# Patient Record
Sex: FEMALE | Race: WHITE | Employment: OTHER | URBAN - NONMETROPOLITAN AREA
[De-identification: names, ages, dates, MRNs, and addresses within clinical notes are randomized per-mention and may not be internally consistent; named-entity substitution may affect disease eponyms.]

---

## 2017-01-25 DIAGNOSIS — F41.9 ANXIETY: ICD-10-CM

## 2017-01-25 RX ORDER — LORAZEPAM 1 MG/1
TABLET ORAL
Qty: 30 TABLET | Refills: 0 | Status: SHIPPED | OUTPATIENT
Start: 2017-01-25 | End: 2017-02-16 | Stop reason: SDUPTHER

## 2017-02-16 ENCOUNTER — OFFICE VISIT (OUTPATIENT)
Dept: FAMILY MEDICINE CLINIC | Age: 67
End: 2017-02-16

## 2017-02-16 VITALS
WEIGHT: 143 LBS | SYSTOLIC BLOOD PRESSURE: 116 MMHG | HEIGHT: 62 IN | TEMPERATURE: 98 F | DIASTOLIC BLOOD PRESSURE: 70 MMHG | OXYGEN SATURATION: 99 % | HEART RATE: 63 BPM | BODY MASS INDEX: 26.31 KG/M2

## 2017-02-16 DIAGNOSIS — F41.9 ANXIETY: Primary | ICD-10-CM

## 2017-02-16 DIAGNOSIS — R25.1 OCCASIONAL TREMORS: ICD-10-CM

## 2017-02-16 DIAGNOSIS — F41.9 ANXIETY: ICD-10-CM

## 2017-02-16 LAB
T4 TOTAL: 7.6 UG/DL (ref 4.5–11.7)
TSH SERPL DL<=0.05 MIU/L-ACNC: 1.03 UIU/ML (ref 0.27–4.2)

## 2017-02-16 PROCEDURE — G8420 CALC BMI NORM PARAMETERS: HCPCS | Performed by: NURSE PRACTITIONER

## 2017-02-16 PROCEDURE — 1090F PRES/ABSN URINE INCON ASSESS: CPT | Performed by: NURSE PRACTITIONER

## 2017-02-16 PROCEDURE — G8400 PT W/DXA NO RESULTS DOC: HCPCS | Performed by: NURSE PRACTITIONER

## 2017-02-16 PROCEDURE — 99213 OFFICE O/P EST LOW 20 MIN: CPT | Performed by: NURSE PRACTITIONER

## 2017-02-16 PROCEDURE — 3014F SCREEN MAMMO DOC REV: CPT | Performed by: NURSE PRACTITIONER

## 2017-02-16 PROCEDURE — 4040F PNEUMOC VAC/ADMIN/RCVD: CPT | Performed by: NURSE PRACTITIONER

## 2017-02-16 PROCEDURE — G8427 DOCREV CUR MEDS BY ELIG CLIN: HCPCS | Performed by: NURSE PRACTITIONER

## 2017-02-16 PROCEDURE — 1036F TOBACCO NON-USER: CPT | Performed by: NURSE PRACTITIONER

## 2017-02-16 PROCEDURE — G8484 FLU IMMUNIZE NO ADMIN: HCPCS | Performed by: NURSE PRACTITIONER

## 2017-02-16 PROCEDURE — 3017F COLORECTAL CA SCREEN DOC REV: CPT | Performed by: NURSE PRACTITIONER

## 2017-02-16 PROCEDURE — 1123F ACP DISCUSS/DSCN MKR DOCD: CPT | Performed by: NURSE PRACTITIONER

## 2017-02-16 RX ORDER — LORAZEPAM 1 MG/1
TABLET ORAL
Qty: 30 TABLET | Refills: 0 | Status: SHIPPED | OUTPATIENT
Start: 2017-02-16 | End: 2017-03-30 | Stop reason: SDUPTHER

## 2017-02-16 ASSESSMENT — PATIENT HEALTH QUESTIONNAIRE - PHQ9
2. FEELING DOWN, DEPRESSED OR HOPELESS: 0
1. LITTLE INTEREST OR PLEASURE IN DOING THINGS: 0
SUM OF ALL RESPONSES TO PHQ QUESTIONS 1-9: 0
SUM OF ALL RESPONSES TO PHQ9 QUESTIONS 1 & 2: 0

## 2017-02-16 ASSESSMENT — ENCOUNTER SYMPTOMS
SHORTNESS OF BREATH: 0
COUGH: 0

## 2017-03-30 DIAGNOSIS — F41.9 ANXIETY: ICD-10-CM

## 2017-03-30 RX ORDER — LORAZEPAM 1 MG/1
TABLET ORAL
Qty: 30 TABLET | Refills: 0 | Status: SHIPPED | OUTPATIENT
Start: 2017-03-30 | End: 2017-05-02 | Stop reason: SDUPTHER

## 2017-05-02 DIAGNOSIS — F41.9 ANXIETY: ICD-10-CM

## 2017-05-02 RX ORDER — LORAZEPAM 1 MG/1
TABLET ORAL
Qty: 30 TABLET | Refills: 0 | Status: SHIPPED | OUTPATIENT
Start: 2017-05-02 | End: 2017-05-16 | Stop reason: SDUPTHER

## 2017-05-16 ENCOUNTER — OFFICE VISIT (OUTPATIENT)
Dept: FAMILY MEDICINE CLINIC | Age: 67
End: 2017-05-16

## 2017-05-16 VITALS
OXYGEN SATURATION: 98 % | SYSTOLIC BLOOD PRESSURE: 110 MMHG | DIASTOLIC BLOOD PRESSURE: 64 MMHG | BODY MASS INDEX: 25.58 KG/M2 | WEIGHT: 139 LBS | HEIGHT: 62 IN | TEMPERATURE: 97.9 F | HEART RATE: 61 BPM

## 2017-05-16 DIAGNOSIS — Z12.31 SCREENING MAMMOGRAM, ENCOUNTER FOR: ICD-10-CM

## 2017-05-16 DIAGNOSIS — I49.8 SINUS ARRHYTHMIA: ICD-10-CM

## 2017-05-16 DIAGNOSIS — I10 ESSENTIAL HYPERTENSION: Primary | ICD-10-CM

## 2017-05-16 DIAGNOSIS — Z78.0 POST-MENOPAUSAL: ICD-10-CM

## 2017-05-16 DIAGNOSIS — F41.9 ANXIETY: ICD-10-CM

## 2017-05-16 PROCEDURE — 1090F PRES/ABSN URINE INCON ASSESS: CPT | Performed by: NURSE PRACTITIONER

## 2017-05-16 PROCEDURE — 1036F TOBACCO NON-USER: CPT | Performed by: NURSE PRACTITIONER

## 2017-05-16 PROCEDURE — 3017F COLORECTAL CA SCREEN DOC REV: CPT | Performed by: NURSE PRACTITIONER

## 2017-05-16 PROCEDURE — 1123F ACP DISCUSS/DSCN MKR DOCD: CPT | Performed by: NURSE PRACTITIONER

## 2017-05-16 PROCEDURE — 3014F SCREEN MAMMO DOC REV: CPT | Performed by: NURSE PRACTITIONER

## 2017-05-16 PROCEDURE — 4040F PNEUMOC VAC/ADMIN/RCVD: CPT | Performed by: NURSE PRACTITIONER

## 2017-05-16 PROCEDURE — 99213 OFFICE O/P EST LOW 20 MIN: CPT | Performed by: NURSE PRACTITIONER

## 2017-05-16 PROCEDURE — G8420 CALC BMI NORM PARAMETERS: HCPCS | Performed by: NURSE PRACTITIONER

## 2017-05-16 PROCEDURE — G8400 PT W/DXA NO RESULTS DOC: HCPCS | Performed by: NURSE PRACTITIONER

## 2017-05-16 PROCEDURE — G8427 DOCREV CUR MEDS BY ELIG CLIN: HCPCS | Performed by: NURSE PRACTITIONER

## 2017-05-16 RX ORDER — ATENOLOL 50 MG/1
50 TABLET ORAL DAILY
Qty: 30 TABLET | Refills: 5 | Status: SHIPPED | OUTPATIENT
Start: 2017-05-16 | End: 2017-11-16 | Stop reason: SDUPTHER

## 2017-05-16 RX ORDER — LORAZEPAM 1 MG/1
TABLET ORAL
Qty: 30 TABLET | Refills: 0 | Status: SHIPPED | OUTPATIENT
Start: 2017-05-16 | End: 2017-06-12 | Stop reason: SDUPTHER

## 2017-05-16 ASSESSMENT — ENCOUNTER SYMPTOMS
SHORTNESS OF BREATH: 0
ORTHOPNEA: 0
COUGH: 0

## 2017-05-30 ENCOUNTER — HOSPITAL ENCOUNTER (OUTPATIENT)
Dept: WOMENS IMAGING | Age: 67
Discharge: HOME | End: 2017-05-30
Payer: MEDICARE

## 2017-05-30 DIAGNOSIS — Z12.31 SCREENING MAMMOGRAM, ENCOUNTER FOR: ICD-10-CM

## 2017-05-30 DIAGNOSIS — Z78.0 POST-MENOPAUSAL: ICD-10-CM

## 2017-05-30 PROCEDURE — 77080 DXA BONE DENSITY AXIAL: CPT

## 2017-05-30 PROCEDURE — 77063 BREAST TOMOSYNTHESIS BI: CPT

## 2017-06-12 DIAGNOSIS — F41.9 ANXIETY: ICD-10-CM

## 2017-06-12 RX ORDER — LORAZEPAM 1 MG/1
TABLET ORAL
Qty: 30 TABLET | Refills: 0 | Status: SHIPPED | OUTPATIENT
Start: 2017-06-12 | End: 2018-01-24 | Stop reason: ALTCHOICE

## 2017-06-26 ENCOUNTER — OFFICE VISIT (OUTPATIENT)
Dept: FAMILY MEDICINE CLINIC | Age: 67
End: 2017-06-26

## 2017-06-26 VITALS
HEIGHT: 62 IN | OXYGEN SATURATION: 99 % | HEART RATE: 58 BPM | BODY MASS INDEX: 25.86 KG/M2 | SYSTOLIC BLOOD PRESSURE: 124 MMHG | DIASTOLIC BLOOD PRESSURE: 74 MMHG | WEIGHT: 140.5 LBS

## 2017-06-26 DIAGNOSIS — L30.9 DERMATITIS: ICD-10-CM

## 2017-06-26 PROCEDURE — G8399 PT W/DXA RESULTS DOCUMENT: HCPCS | Performed by: FAMILY MEDICINE

## 2017-06-26 PROCEDURE — 99214 OFFICE O/P EST MOD 30 MIN: CPT | Performed by: FAMILY MEDICINE

## 2017-06-26 PROCEDURE — 3017F COLORECTAL CA SCREEN DOC REV: CPT | Performed by: FAMILY MEDICINE

## 2017-06-26 PROCEDURE — 4040F PNEUMOC VAC/ADMIN/RCVD: CPT | Performed by: FAMILY MEDICINE

## 2017-06-26 PROCEDURE — 1036F TOBACCO NON-USER: CPT | Performed by: FAMILY MEDICINE

## 2017-06-26 PROCEDURE — G8427 DOCREV CUR MEDS BY ELIG CLIN: HCPCS | Performed by: FAMILY MEDICINE

## 2017-06-26 PROCEDURE — 1123F ACP DISCUSS/DSCN MKR DOCD: CPT | Performed by: FAMILY MEDICINE

## 2017-06-26 PROCEDURE — 1090F PRES/ABSN URINE INCON ASSESS: CPT | Performed by: FAMILY MEDICINE

## 2017-06-26 PROCEDURE — G8419 CALC BMI OUT NRM PARAM NOF/U: HCPCS | Performed by: FAMILY MEDICINE

## 2017-06-26 PROCEDURE — 3014F SCREEN MAMMO DOC REV: CPT | Performed by: FAMILY MEDICINE

## 2017-06-26 RX ORDER — TRIAMCINOLONE ACETONIDE 1 MG/G
CREAM TOPICAL
Qty: 80 G | Refills: 1 | Status: SHIPPED | OUTPATIENT
Start: 2017-06-26 | End: 2017-11-13 | Stop reason: SDUPTHER

## 2017-06-26 ASSESSMENT — ENCOUNTER SYMPTOMS
ABDOMINAL PAIN: 0
SHORTNESS OF BREATH: 0
SORE THROAT: 0

## 2017-07-18 ENCOUNTER — OFFICE VISIT (OUTPATIENT)
Dept: FAMILY MEDICINE CLINIC | Age: 67
End: 2017-07-18

## 2017-07-18 VITALS
HEIGHT: 62 IN | HEART RATE: 58 BPM | BODY MASS INDEX: 25.95 KG/M2 | SYSTOLIC BLOOD PRESSURE: 122 MMHG | DIASTOLIC BLOOD PRESSURE: 70 MMHG | OXYGEN SATURATION: 97 % | WEIGHT: 141 LBS

## 2017-07-18 DIAGNOSIS — L30.9 DERMATITIS: Primary | ICD-10-CM

## 2017-07-18 PROCEDURE — 99214 OFFICE O/P EST MOD 30 MIN: CPT | Performed by: FAMILY MEDICINE

## 2017-07-18 PROCEDURE — G8427 DOCREV CUR MEDS BY ELIG CLIN: HCPCS | Performed by: FAMILY MEDICINE

## 2017-07-18 PROCEDURE — G8419 CALC BMI OUT NRM PARAM NOF/U: HCPCS | Performed by: FAMILY MEDICINE

## 2017-07-18 PROCEDURE — G8399 PT W/DXA RESULTS DOCUMENT: HCPCS | Performed by: FAMILY MEDICINE

## 2017-07-18 PROCEDURE — 3017F COLORECTAL CA SCREEN DOC REV: CPT | Performed by: FAMILY MEDICINE

## 2017-07-18 PROCEDURE — 1123F ACP DISCUSS/DSCN MKR DOCD: CPT | Performed by: FAMILY MEDICINE

## 2017-07-18 PROCEDURE — 1036F TOBACCO NON-USER: CPT | Performed by: FAMILY MEDICINE

## 2017-07-18 PROCEDURE — 1090F PRES/ABSN URINE INCON ASSESS: CPT | Performed by: FAMILY MEDICINE

## 2017-07-18 PROCEDURE — 4040F PNEUMOC VAC/ADMIN/RCVD: CPT | Performed by: FAMILY MEDICINE

## 2017-07-18 PROCEDURE — 3014F SCREEN MAMMO DOC REV: CPT | Performed by: FAMILY MEDICINE

## 2017-07-18 ASSESSMENT — ENCOUNTER SYMPTOMS
SHORTNESS OF BREATH: 0
ABDOMINAL PAIN: 0
SORE THROAT: 0

## 2017-08-16 ENCOUNTER — OFFICE VISIT (OUTPATIENT)
Dept: FAMILY MEDICINE CLINIC | Age: 67
End: 2017-08-16

## 2017-08-16 VITALS
TEMPERATURE: 97.5 F | HEART RATE: 51 BPM | DIASTOLIC BLOOD PRESSURE: 78 MMHG | WEIGHT: 141 LBS | BODY MASS INDEX: 25.79 KG/M2 | OXYGEN SATURATION: 98 % | SYSTOLIC BLOOD PRESSURE: 116 MMHG

## 2017-08-16 DIAGNOSIS — F41.9 ANXIETY: Primary | ICD-10-CM

## 2017-08-16 PROCEDURE — 4040F PNEUMOC VAC/ADMIN/RCVD: CPT | Performed by: NURSE PRACTITIONER

## 2017-08-16 PROCEDURE — G8427 DOCREV CUR MEDS BY ELIG CLIN: HCPCS | Performed by: NURSE PRACTITIONER

## 2017-08-16 PROCEDURE — 3014F SCREEN MAMMO DOC REV: CPT | Performed by: NURSE PRACTITIONER

## 2017-08-16 PROCEDURE — 3017F COLORECTAL CA SCREEN DOC REV: CPT | Performed by: NURSE PRACTITIONER

## 2017-08-16 PROCEDURE — 1090F PRES/ABSN URINE INCON ASSESS: CPT | Performed by: NURSE PRACTITIONER

## 2017-08-16 PROCEDURE — G8399 PT W/DXA RESULTS DOCUMENT: HCPCS | Performed by: NURSE PRACTITIONER

## 2017-08-16 PROCEDURE — G8419 CALC BMI OUT NRM PARAM NOF/U: HCPCS | Performed by: NURSE PRACTITIONER

## 2017-08-16 PROCEDURE — 1036F TOBACCO NON-USER: CPT | Performed by: NURSE PRACTITIONER

## 2017-08-16 PROCEDURE — 99213 OFFICE O/P EST LOW 20 MIN: CPT | Performed by: NURSE PRACTITIONER

## 2017-08-16 PROCEDURE — 1123F ACP DISCUSS/DSCN MKR DOCD: CPT | Performed by: NURSE PRACTITIONER

## 2017-08-16 ASSESSMENT — ENCOUNTER SYMPTOMS
SHORTNESS OF BREATH: 0
COUGH: 0

## 2017-11-13 DIAGNOSIS — L71.9 ROSACEA: ICD-10-CM

## 2017-11-13 DIAGNOSIS — L30.9 DERMATITIS: ICD-10-CM

## 2017-11-13 RX ORDER — TRIAMCINOLONE ACETONIDE 1 MG/G
CREAM TOPICAL
Qty: 80 G | Refills: 1 | Status: SHIPPED | OUTPATIENT
Start: 2017-11-13 | End: 2018-03-02 | Stop reason: ALTCHOICE

## 2017-11-13 RX ORDER — METRONIDAZOLE 7.5 MG/G
GEL TOPICAL
Qty: 1 TUBE | Refills: 1 | Status: SHIPPED | OUTPATIENT
Start: 2017-11-13 | End: 2018-08-16 | Stop reason: SDUPTHER

## 2017-11-16 DIAGNOSIS — I10 ESSENTIAL HYPERTENSION: ICD-10-CM

## 2017-11-16 RX ORDER — ATENOLOL 50 MG/1
50 TABLET ORAL DAILY
Qty: 30 TABLET | Refills: 5 | Status: SHIPPED | OUTPATIENT
Start: 2017-11-16 | End: 2018-05-22 | Stop reason: SDUPTHER

## 2017-11-16 RX ORDER — ALENDRONATE SODIUM 70 MG/1
70 TABLET ORAL
Qty: 4 TABLET | Refills: 12 | Status: SHIPPED | OUTPATIENT
Start: 2017-11-16 | End: 2018-08-16 | Stop reason: SDUPTHER

## 2018-01-22 ENCOUNTER — NURSE ONLY (OUTPATIENT)
Dept: FAMILY MEDICINE CLINIC | Age: 68
End: 2018-01-22
Payer: MEDICARE

## 2018-01-22 DIAGNOSIS — Z23 NEED FOR INFLUENZA VACCINATION: Primary | ICD-10-CM

## 2018-01-22 PROCEDURE — 90662 IIV NO PRSV INCREASED AG IM: CPT | Performed by: NURSE PRACTITIONER

## 2018-01-22 PROCEDURE — G0008 ADMIN INFLUENZA VIRUS VAC: HCPCS | Performed by: NURSE PRACTITIONER

## 2018-01-22 NOTE — PROGRESS NOTES
After obtaining consent, and per orders of kyrstal ashanti cnp, injection of flu given in Left deltoid by Abdoul Osman. Patient instructed to remain in clinic for 20 minutes afterwards, and to report any adverse reaction to me immediately. Vaccine Information Sheet, \"Influenza - Inactivated\"  given to William Marks, or parent/legal guardian of  William Marks and verbalized understanding. Patient responses:    Have you ever had a reaction to a flu vaccine? No  Are you able to eat eggs without adverse effects? No  Do you have any current illness? No  Have you ever had Guillian Roscoe Syndrome? No    Flu vaccine given per order. Please see immunization tab.

## 2018-01-24 ENCOUNTER — OFFICE VISIT (OUTPATIENT)
Dept: FAMILY MEDICINE CLINIC | Age: 68
End: 2018-01-24
Payer: MEDICARE

## 2018-01-24 VITALS
SYSTOLIC BLOOD PRESSURE: 140 MMHG | BODY MASS INDEX: 26.68 KG/M2 | WEIGHT: 145 LBS | HEIGHT: 62 IN | DIASTOLIC BLOOD PRESSURE: 76 MMHG | HEART RATE: 65 BPM | OXYGEN SATURATION: 95 %

## 2018-01-24 DIAGNOSIS — L30.9 DERMATITIS: Primary | ICD-10-CM

## 2018-01-24 PROCEDURE — 11100 PR BIOPSY OF SKIN LESION: CPT | Performed by: FAMILY MEDICINE

## 2018-01-24 ASSESSMENT — ENCOUNTER SYMPTOMS
SHORTNESS OF BREATH: 0
SORE THROAT: 0
ABDOMINAL PAIN: 0

## 2018-01-24 NOTE — PROGRESS NOTES
Subjective  Alexia Awan, 69 y.o. female presents today with:  Chief Complaint   Patient presents with    6 Month Follow-Up       Rash   This is a chronic problem. The current episode started more than 1 year ago. The problem has been gradually improving since onset. The rash is diffuse (arms, legs, upper back). The rash is characterized by redness and itchiness. She was exposed to nothing. Pertinent negatives include no fever, shortness of breath or sore throat. Past treatments include antibiotic cream and topical steroids. The treatment provided moderate relief. There is no history of eczema. Pt of Krytal's here today for f/u on dermatitis. Rash better on TAC cream but not completely clear. Denies personal h/o skin cancer. Review of Systems   Constitutional: Negative for fever. HENT: Negative for sore throat. Respiratory: Negative for shortness of breath. Cardiovascular: Negative for chest pain. Gastrointestinal: Negative for abdominal pain. Skin: Negative for rash. Past Medical History:   Diagnosis Date    Acne rosacea     Anxiety     Dermatitis     Hypertension     Osteopenia      No past surgical history on file. Social History     Social History    Marital status:      Spouse name: N/A    Number of children: N/A    Years of education: N/A     Occupational History    Not on file.      Social History Main Topics    Smoking status: Never Smoker    Smokeless tobacco: Never Used    Alcohol use No    Drug use: No    Sexual activity: Not on file     Other Topics Concern    Not on file     Social History Narrative    No narrative on file     Family History   Problem Relation Age of Onset    Diabetes Mother     Breast Cancer Daughter      Allergies   Allergen Reactions    Codeine Rash     Current Outpatient Prescriptions   Medication Sig Dispense Refill    alendronate (FOSAMAX) 70 MG tablet Take 1 tablet by mouth every 7 days 4 tablet 12    atenolol (TENORMIN) 50 MG tablet Take 1 tablet by mouth daily 30 tablet 5    triamcinolone (KENALOG) 0.1 % cream Apply topically 2 times daily Monday through Friday only. Take weekend off. Do not use on face, groin, armpits, breasts tissue. 80 g 1    metroNIDAZOLE (METROGEL) 0.75 % gel Apply topically 2 times daily. 1 Tube 1    acetaminophen (TYLENOL) 650 MG CR tablet Take 650 mg by mouth every 8 hours as needed for Pain       No current facility-administered medications for this visit. Objective    Vitals:    01/24/18 1028 01/24/18 1032   BP: (!) 142/78 (!) 140/76   Pulse: 65    SpO2: 95%    Weight: 145 lb (65.8 kg)    Height: 5' 2\" (1.575 m)        Physical Exam   Constitutional: She appears well-developed and well-nourished. HENT:   Head: Normocephalic and atraumatic. Mouth/Throat: No oropharyngeal exudate. Neck: Normal range of motion. Neck supple. Cardiovascular: Normal rate, regular rhythm, normal heart sounds and intact distal pulses. No murmur heard. Pulmonary/Chest: Effort normal and breath sounds normal.   Abdominal: Soft. Bowel sounds are normal.   Skin: Skin is warm and dry. Erythematous scaly patches on upper arms   Psychiatric: She has a normal mood and affect. Assessment & Plan   1. Dermatitis  16104 - [place] BIOPSY OF SKIN LESION     Left upper arm biopsy, r/o actinic keratosis. Biopsy done using 1% lidocaine with epinephrine for anesthesia. A 15 blade was used to obtain the shave biopsy/biopsies. Hyfercation at the base of site(s). Informed consent was given by the patient. Risks including infection, bleeding and scarring were discussed. No guarantee how the scar will look. Post op wound care instructions were given to the patient. He/She will f/u in 2 weeks or sooner if needed for problems.      Recommended moisturizers for the skin:  Cetaphil DermaControl Moisturizer for face in people with acne, Cetaphil, CeraVe, Aveeno, or Vanicream-(this is free of dyes/lanolin/fragrance, masking

## 2018-02-13 ENCOUNTER — TELEPHONE (OUTPATIENT)
Dept: FAMILY MEDICINE CLINIC | Age: 68
End: 2018-02-13

## 2018-02-13 NOTE — TELEPHONE ENCOUNTER
Please let her know that the biopsy was consistent with severe chronic actinic damage which is what I had discussed with her that I was concerned about. This means sun damage which is a pre-skin cancer condition. I will review some options with her at her follow-up visit later this month.

## 2018-02-14 DIAGNOSIS — L57.8 ACTINIC DERMATITIS: Primary | ICD-10-CM

## 2018-02-15 ENCOUNTER — TELEPHONE (OUTPATIENT)
Dept: FAMILY MEDICINE CLINIC | Age: 68
End: 2018-02-15

## 2018-02-16 ENCOUNTER — OFFICE VISIT (OUTPATIENT)
Dept: FAMILY MEDICINE CLINIC | Age: 68
End: 2018-02-16
Payer: MEDICARE

## 2018-02-16 VITALS
OXYGEN SATURATION: 98 % | DIASTOLIC BLOOD PRESSURE: 66 MMHG | WEIGHT: 143.8 LBS | TEMPERATURE: 97.4 F | BODY MASS INDEX: 26.46 KG/M2 | HEIGHT: 62 IN | HEART RATE: 52 BPM | SYSTOLIC BLOOD PRESSURE: 124 MMHG

## 2018-02-16 DIAGNOSIS — Z12.11 COLON CANCER SCREENING: ICD-10-CM

## 2018-02-16 DIAGNOSIS — I10 ESSENTIAL HYPERTENSION: Primary | ICD-10-CM

## 2018-02-16 DIAGNOSIS — M85.80 OSTEOPENIA, UNSPECIFIED LOCATION: ICD-10-CM

## 2018-02-16 DIAGNOSIS — L57.8 ACTINIC DERMATITIS: ICD-10-CM

## 2018-02-16 DIAGNOSIS — Z13.220 LIPID SCREENING: ICD-10-CM

## 2018-02-16 DIAGNOSIS — F41.9 ANXIETY: ICD-10-CM

## 2018-02-16 PROCEDURE — 3017F COLORECTAL CA SCREEN DOC REV: CPT | Performed by: NURSE PRACTITIONER

## 2018-02-16 PROCEDURE — 1123F ACP DISCUSS/DSCN MKR DOCD: CPT | Performed by: NURSE PRACTITIONER

## 2018-02-16 PROCEDURE — 1036F TOBACCO NON-USER: CPT | Performed by: NURSE PRACTITIONER

## 2018-02-16 PROCEDURE — G8419 CALC BMI OUT NRM PARAM NOF/U: HCPCS | Performed by: NURSE PRACTITIONER

## 2018-02-16 PROCEDURE — G8399 PT W/DXA RESULTS DOCUMENT: HCPCS | Performed by: NURSE PRACTITIONER

## 2018-02-16 PROCEDURE — G8427 DOCREV CUR MEDS BY ELIG CLIN: HCPCS | Performed by: NURSE PRACTITIONER

## 2018-02-16 PROCEDURE — 3014F SCREEN MAMMO DOC REV: CPT | Performed by: NURSE PRACTITIONER

## 2018-02-16 PROCEDURE — 99213 OFFICE O/P EST LOW 20 MIN: CPT | Performed by: NURSE PRACTITIONER

## 2018-02-16 PROCEDURE — 4040F PNEUMOC VAC/ADMIN/RCVD: CPT | Performed by: NURSE PRACTITIONER

## 2018-02-16 PROCEDURE — 1090F PRES/ABSN URINE INCON ASSESS: CPT | Performed by: NURSE PRACTITIONER

## 2018-02-16 PROCEDURE — G8484 FLU IMMUNIZE NO ADMIN: HCPCS | Performed by: NURSE PRACTITIONER

## 2018-02-16 ASSESSMENT — ENCOUNTER SYMPTOMS
COUGH: 0
SHORTNESS OF BREATH: 0

## 2018-02-16 NOTE — PROGRESS NOTES
tablet Take 1 tablet by mouth every 7 days 4 tablet 12    atenolol (TENORMIN) 50 MG tablet Take 1 tablet by mouth daily 30 tablet 5    triamcinolone (KENALOG) 0.1 % cream Apply topically 2 times daily Monday through Friday only. Take weekend off. Do not use on face, groin, armpits, breasts tissue. 80 g 1    metroNIDAZOLE (METROGEL) 0.75 % gel Apply topically 2 times daily. 1 Tube 1    acetaminophen (TYLENOL) 650 MG CR tablet Take 650 mg by mouth every 8 hours as needed for Pain       No current facility-administered medications on file prior to visit. Allergies   Allergen Reactions    Codeine Rash       Review of Systems   Constitutional: Negative for chills, diaphoresis, fatigue, fever and malaise/fatigue. HENT: Negative for congestion. Respiratory: Negative for cough and shortness of breath. Cardiovascular: Negative for chest pain, palpitations and leg swelling. Objective  Vitals:    02/16/18 1032   BP: 124/66   Site: Left Arm   Position: Sitting   Cuff Size: Medium Adult   Pulse: 52   Temp: 97.4 °F (36.3 °C)   TempSrc: Tympanic   SpO2: 98%   Weight: 143 lb 12.8 oz (65.2 kg)   Height: 5' 2\" (1.575 m)     Physical Exam   Constitutional: She is oriented to person, place, and time. She appears well-developed and well-nourished. No distress. HENT:   Head: Normocephalic and atraumatic. Right Ear: Tympanic membrane, external ear and ear canal normal.   Left Ear: Tympanic membrane, external ear and ear canal normal.   Nose: Nose normal.   Mouth/Throat: Oropharynx is clear and moist. No oropharyngeal exudate. Eyes: Conjunctivae are normal. Pupils are equal, round, and reactive to light. Neck: Normal range of motion. Neck supple. Cardiovascular: Normal rate, regular rhythm and normal heart sounds. Pulmonary/Chest: Effort normal and breath sounds normal. No respiratory distress. Neurological: She is alert and oriented to person, place, and time. Skin: Skin is warm and dry.  No rash

## 2018-02-20 DIAGNOSIS — Z13.220 LIPID SCREENING: ICD-10-CM

## 2018-02-20 DIAGNOSIS — I10 ESSENTIAL HYPERTENSION: ICD-10-CM

## 2018-02-20 LAB
ALT SERPL-CCNC: 13 U/L (ref 0–33)
ANION GAP SERPL CALCULATED.3IONS-SCNC: 17 MEQ/L (ref 7–13)
AST SERPL-CCNC: 15 U/L (ref 0–35)
BUN BLDV-MCNC: 11 MG/DL (ref 8–23)
CALCIUM SERPL-MCNC: 9.5 MG/DL (ref 8.6–10.2)
CHLORIDE BLD-SCNC: 100 MEQ/L (ref 98–107)
CHOLESTEROL, TOTAL: 162 MG/DL (ref 0–199)
CO2: 25 MEQ/L (ref 22–29)
CREAT SERPL-MCNC: 0.61 MG/DL (ref 0.5–0.9)
GFR AFRICAN AMERICAN: >60
GFR NON-AFRICAN AMERICAN: >60
GLUCOSE BLD-MCNC: 79 MG/DL (ref 74–109)
HCT VFR BLD CALC: 35.9 % (ref 37–47)
HDLC SERPL-MCNC: 40 MG/DL (ref 40–59)
HEMOGLOBIN: 12.1 G/DL (ref 12–16)
LDL CHOLESTEROL CALCULATED: 95 MG/DL (ref 0–129)
MCH RBC QN AUTO: 33.4 PG (ref 27–31.3)
MCHC RBC AUTO-ENTMCNC: 33.7 % (ref 33–37)
MCV RBC AUTO: 99.1 FL (ref 82–100)
PDW BLD-RTO: 13.4 % (ref 11.5–14.5)
PLATELET # BLD: 226 K/UL (ref 130–400)
POTASSIUM SERPL-SCNC: 4.6 MEQ/L (ref 3.5–5.1)
RBC # BLD: 3.62 M/UL (ref 4.2–5.4)
SODIUM BLD-SCNC: 142 MEQ/L (ref 132–144)
TRIGL SERPL-MCNC: 134 MG/DL (ref 0–200)
WBC # BLD: 5.4 K/UL (ref 4.8–10.8)

## 2018-03-02 ENCOUNTER — OFFICE VISIT (OUTPATIENT)
Dept: FAMILY MEDICINE CLINIC | Age: 68
End: 2018-03-02
Payer: MEDICARE

## 2018-03-02 VITALS
HEART RATE: 56 BPM | BODY MASS INDEX: 26.46 KG/M2 | SYSTOLIC BLOOD PRESSURE: 136 MMHG | WEIGHT: 143.8 LBS | DIASTOLIC BLOOD PRESSURE: 82 MMHG | OXYGEN SATURATION: 98 % | TEMPERATURE: 97.5 F | HEIGHT: 62 IN

## 2018-03-02 DIAGNOSIS — M25.561 RIGHT KNEE PAIN, UNSPECIFIED CHRONICITY: Primary | ICD-10-CM

## 2018-03-02 PROCEDURE — G8399 PT W/DXA RESULTS DOCUMENT: HCPCS | Performed by: NURSE PRACTITIONER

## 2018-03-02 PROCEDURE — 4040F PNEUMOC VAC/ADMIN/RCVD: CPT | Performed by: NURSE PRACTITIONER

## 2018-03-02 PROCEDURE — 1090F PRES/ABSN URINE INCON ASSESS: CPT | Performed by: NURSE PRACTITIONER

## 2018-03-02 PROCEDURE — G8510 SCR DEP NEG, NO PLAN REQD: HCPCS | Performed by: NURSE PRACTITIONER

## 2018-03-02 PROCEDURE — G8484 FLU IMMUNIZE NO ADMIN: HCPCS | Performed by: NURSE PRACTITIONER

## 2018-03-02 PROCEDURE — 3288F FALL RISK ASSESSMENT DOCD: CPT | Performed by: NURSE PRACTITIONER

## 2018-03-02 PROCEDURE — G8419 CALC BMI OUT NRM PARAM NOF/U: HCPCS | Performed by: NURSE PRACTITIONER

## 2018-03-02 PROCEDURE — 1036F TOBACCO NON-USER: CPT | Performed by: NURSE PRACTITIONER

## 2018-03-02 PROCEDURE — 3014F SCREEN MAMMO DOC REV: CPT | Performed by: NURSE PRACTITIONER

## 2018-03-02 PROCEDURE — G8427 DOCREV CUR MEDS BY ELIG CLIN: HCPCS | Performed by: NURSE PRACTITIONER

## 2018-03-02 PROCEDURE — 99213 OFFICE O/P EST LOW 20 MIN: CPT | Performed by: NURSE PRACTITIONER

## 2018-03-02 PROCEDURE — 3017F COLORECTAL CA SCREEN DOC REV: CPT | Performed by: NURSE PRACTITIONER

## 2018-03-02 PROCEDURE — 1123F ACP DISCUSS/DSCN MKR DOCD: CPT | Performed by: NURSE PRACTITIONER

## 2018-03-02 ASSESSMENT — ENCOUNTER SYMPTOMS
COUGH: 0
SHORTNESS OF BREATH: 0

## 2018-03-02 ASSESSMENT — PATIENT HEALTH QUESTIONNAIRE - PHQ9
SUM OF ALL RESPONSES TO PHQ9 QUESTIONS 1 & 2: 0
1. LITTLE INTEREST OR PLEASURE IN DOING THINGS: 0
SUM OF ALL RESPONSES TO PHQ QUESTIONS 1-9: 0
2. FEELING DOWN, DEPRESSED OR HOPELESS: 0

## 2018-03-13 ENCOUNTER — HOSPITAL ENCOUNTER (OUTPATIENT)
Dept: PHYSICAL THERAPY | Age: 68
Setting detail: THERAPIES SERIES
Discharge: HOME | End: 2018-03-13
Payer: MEDICARE

## 2018-03-13 PROCEDURE — 97161 PT EVAL LOW COMPLEX 20 MIN: CPT

## 2018-03-13 PROCEDURE — G8978 MOBILITY CURRENT STATUS: HCPCS

## 2018-03-13 PROCEDURE — G8979 MOBILITY GOAL STATUS: HCPCS

## 2018-03-13 PROCEDURE — 97110 THERAPEUTIC EXERCISES: CPT

## 2018-03-13 ASSESSMENT — PAIN SCALES - GENERAL: PAINLEVEL_OUTOF10: 0

## 2018-03-13 ASSESSMENT — PAIN DESCRIPTION - FREQUENCY: FREQUENCY: INTERMITTENT

## 2018-03-13 ASSESSMENT — PAIN DESCRIPTION - LOCATION: LOCATION: KNEE

## 2018-03-13 ASSESSMENT — PAIN DESCRIPTION - PAIN TYPE: TYPE: ACUTE PAIN

## 2018-03-13 ASSESSMENT — PAIN DESCRIPTION - PROGRESSION: CLINICAL_PROGRESSION: RAPIDLY IMPROVING

## 2018-03-13 ASSESSMENT — PAIN DESCRIPTION - DESCRIPTORS: DESCRIPTORS: ACHING;STABBING

## 2018-03-13 ASSESSMENT — PAIN DESCRIPTION - ORIENTATION: ORIENTATION: RIGHT

## 2018-03-13 NOTE — PROGRESS NOTES
[address]     Ph: [phone]  Fax: [phone]    [x] Certification  [] Recertification [x]  Plan of Care  [] Progress Note [] Discharge      To:  Referring Practitioner: Kellie Shannon CNP      From:  Daja Rooney, PT  Patient: Tonio Alejandro     : 1950  Diagnosis: Right Knee pain     Date: 3/13/2018  Treatment Diagnosis: Right knee pain. Plan of Care/Certification Expiration Date: 18  Progress Report Period from:  3/13/2018  to 3/13/2018    Total # of Visits to Date: 1   No Show: 0    Canceled Appointment: 0     OBJECTIVE:   Short Term Goals - Time Frame for Short term goals: 2 weeks    Goals Current/Discharge status  Met   Short term goal 1: Indep. in HEP. HEP provided. [] yes  [x] no     Long Term Goals - Time Frame for Long term goals : 4-6 weeks  Goals Current/ Discharge status Met   Long term goal 1: Pain 3/10 at worst with activity. 7/10 pain at worst. [] yes  [x] no   Long term goal 2: Right LE strength 4+/5 to increase ease of ADL's. Strength RLE  Comment: Quads 4-/5 with pain, HS 5/5, Iliopsoas 4-/5 with pain. Tib Ant/Gastroc 5/5, glut Med 4+/5, Glut Max 4+/5 [] yes  [x] no   Long term goal 3: Pt will shaila. return to exercise routine with pain no greater than 2/10. Unable to exercise D/T pain [] yes  [x] no   Long term goal 4: Return to 80% functional activity. 66% current function per LEFS [] yes  [x] no       Body structures, Functions, Activity limitations: Decreased strength  Assessment: Pt demonstrates decreased strength and increased pain of the right quadriceps tendon affecting her ability to perform ADL's. Specific instructions for Next Treatment: Initiate US to right quad tendon. Prognosis: Good  Discharge Recommendations: Continue to assess pending progress    New Education Provided: Educated pt on expectations of PT and POC. Written HEP provided.   G-Codes  PT
4+/5     AROM RLE (degrees)  RLE AROM: WNL     Observation/Palpation  Posture: Good  Palpation: TTP Right knee quad tendon    Exercises:   Exercises  Exercise 1: quad sets 10x5\"  Exercise 2: SLR 2x10  Exercise 3: Bridges 10x3\"  Exercise 4: prone quad stretch with strap 3x20  Exercise 5: NS/SF*  Exercise 6: partial squats*  Exercise 7: step ups F/L*  Exercise 8: LAQ*  Exercise 19: US to right quad tendon 3Mhz, 1.0W/cm2 cont*  Exercise 20: HEP: QS, SLR, bridges, quad stretches  *Indicates exercise,modality, or manual techniques to be initiated when appropriate    Assessment: Body structures, Functions, Activity limitations: Decreased strength  Assessment: Pt demonstrates decreased strength and increased pain of the right quadriceps tendon affecting her ability to perform ADL's. Specific instructions for Next Treatment: Initiate US to right quad tendon. Prognosis: Good  Discharge Recommendations: Continue to assess pending progress  Activity Tolerance: Patient Tolerated treatment well     Decision Making: Low Complexity  History: low  Exam: Med  Clinical Presentation: low      Plan  Frequency/Duration:  Plan  Times per week: 2  Plan weeks: 4  Specific instructions for Next Treatment: Initiate US to right quad tendon. Current Treatment Recommendations: Strengthening, ROM, Manual Therapy - Soft Tissue Mobilization, Manual Therapy - Joint Manipulation, Modalities, Home Exercise Program  Plan Comment: Transfer pt care to SULAIM[facility] Jerez  PT G-Codes  Functional Assessment Tool Used: LEFS, clinical judgement  Score: 53/80=66% impaired  Functional Limitation: Mobility: Walking and moving around  Mobility: Walking and Moving Around Current Status (): At least 20 percent but less than 40 percent impaired, limited or restricted  Mobility: Walking and Moving Around Goal Status ():  At least 1 percent but less than 20 percent impaired, limited or restricted    Patient Education  New Education Provided:

## 2018-03-14 ENCOUNTER — HOSPITAL ENCOUNTER (OUTPATIENT)
Dept: PHYSICAL THERAPY | Age: 68
Setting detail: THERAPIES SERIES
Discharge: HOME | End: 2018-03-14
Payer: MEDICARE

## 2018-03-14 PROCEDURE — 97035 APP MDLTY 1+ULTRASOUND EA 15: CPT

## 2018-03-14 PROCEDURE — 97110 THERAPEUTIC EXERCISES: CPT

## 2018-03-14 ASSESSMENT — PAIN DESCRIPTION - PROGRESSION: CLINICAL_PROGRESSION: RAPIDLY IMPROVING

## 2018-03-20 ENCOUNTER — HOSPITAL ENCOUNTER (OUTPATIENT)
Dept: PHYSICAL THERAPY | Age: 68
Setting detail: THERAPIES SERIES
Discharge: HOME | End: 2018-03-20
Payer: MEDICARE

## 2018-03-20 PROCEDURE — 97110 THERAPEUTIC EXERCISES: CPT

## 2018-03-20 ASSESSMENT — PAIN DESCRIPTION - PROGRESSION: CLINICAL_PROGRESSION: RAPIDLY IMPROVING

## 2018-03-20 NOTE — PROGRESS NOTES
[address] [address]  Outpatient Physical Therapy    Treatment Note        Date: 3/20/2018  Patient: Seema Britt  : 1950  ACCT #: [de-identified]  Referring Practitioner: Radha Velásquez CNP  Diagnosis: Right Knee pain    Visit Information:  PT Visit Information  Onset Date: 18  PT Insurance Information: Medicare  Total # of Visits to Date: 3  Plan of Care/Certification Expiration Date: 18  No Show: 0  Canceled Appointment: 0  Progress Note Counter: 3/10    Subjective: No pain for a few days. Worked out in gym upper body and walked the track. Compliant with HEP     HEP Compliance:  [x] Good [] Fair [] Poor [] Reports not doing due to:    Vital Signs  Patient Currently in Pain: Denies   Pain Screening  Patient Currently in Pain: Denies  Pain Assessment  Clinical Progression: Rapidly improving    OBJECTIVE:   Exercises  Exercise 1: quad sets 10x5\"  Exercise 2: SLR 2x10  Exercise 3: Bridges 5\"x15  Exercise 4: Modified Demetrius stretch with strap 2x30\" -   Exercise 5: TM 1.0mph retro 5 min  Exercise 6: partial squats against wall 2x10  Exercise 7: step ups 4\" F/L x10 ea   Exercise 8: LAQ 5\"x15  Exercise 9: R SLS 3x20\" without UE support  Exercise 10: KT; chondromalacia technique R knee  Exercise 20: HEP: QS, SLR, bridges, quad stretches (consider self taping if beneficial         Strength: [x] NT  [] MMT completed:      ROM: [x] NT  [] ROM measurements:        *Indicates exercise, modality, or manual techniques to be initiated when appropriate    Assessment: Body structures, Functions, Activity limitations: Decreased strength  Assessment: Pt reports knee pain on a bike is typical in the past.  Will hold nustep. Min discomfort with step ups. added retro treadmill and KT tape for R knee with good tolerance to both. Pt performing 50% of normal ex routine  Treatment Diagnosis: Right knee pain.   Prognosis: Good  Patient Education: KT tape removal. Watch knee position during

## 2018-03-22 ENCOUNTER — HOSPITAL ENCOUNTER (OUTPATIENT)
Dept: PHYSICAL THERAPY | Age: 68
Setting detail: THERAPIES SERIES
Discharge: HOME | End: 2018-03-22
Payer: MEDICARE

## 2018-03-22 PROCEDURE — 97110 THERAPEUTIC EXERCISES: CPT

## 2018-03-22 PROCEDURE — 97035 APP MDLTY 1+ULTRASOUND EA 15: CPT

## 2018-03-22 ASSESSMENT — PAIN DESCRIPTION - PROGRESSION: CLINICAL_PROGRESSION: RAPIDLY IMPROVING

## 2018-03-22 NOTE — PROGRESS NOTES
[address] [address]  Outpatient Physical Therapy    Treatment Note        Date: 3/22/2018  Patient: Shahrzad Valles  : 1950  ACCT #: [de-identified]  Referring Practitioner: Kvng Ramires CNP  Diagnosis: Right Knee pain    Visit Information:  PT Visit Information  Onset Date: 18  PT Insurance Information: Medicare  Total # of Visits to Date: 4  Plan of Care/Certification Expiration Date: 18  No Show: 0  Canceled Appointment: 0  Progress Note Counter: 4/10    Subjective: no pain today, just sore, I've been on my feel alot today     HEP Compliance:  [x] Good [] Fair [] Poor [] Reports not doing due to:    Vital Signs  Patient Currently in Pain: Denies   Pain Screening  Patient Currently in Pain: Denies  Pain Assessment  Clinical Progression: Rapidly improving    OBJECTIVE:   Exercises  Exercise 1: quad sets 10x5\"  Exercise 2: SLR 2 x 10 with 1# wt, b/l  Exercise 4: Modified Demetrius stretch with strap 3 x 20 sec  Exercise 5: TM 1.0mph retro 4 1/2 min  Exercise 6: partial squats against wall x 15  Exercise 8: SAQ's 5 sec x 15, b/l  Exercise 10: KT; chondromalacia technique R knee  Exercise 11: S/L abd x 10  Exercise 19: US to right quad tendon 3Mhz, 1.0W/cm2 cont*     Strength: [x] NT  [] MMT completed:   ROM: [x] NT  [] ROM measurements:     Modalities:  Modalities  Ultrasound: distal quad, 1 MHz, 1.0 w/cm2, 6 min     *Indicates exercise, modality, or manual techniques to be initiated when appropriate    Assessment: Body structures, Functions, Activity limitations: Decreased strength  Assessment: back pain when attempting left side circles with S/L abd, HOLD circles, good overall shaila, complaint of soreness, fatigue, only, post tx  Treatment Diagnosis: Right knee pain. Prognosis: Good  Patient Education: KT tape removal. Watch knee position during ex's    Goals:  Short term goals  Time Frame for Short term goals: 2 weeks  Short term goal 1: Indep. in HEP.     Long term goals  Time Frame for

## 2018-04-10 ENCOUNTER — HOSPITAL ENCOUNTER (OUTPATIENT)
Dept: PHYSICAL THERAPY | Age: 68
Setting detail: THERAPIES SERIES
Discharge: HOME | End: 2018-04-10
Payer: MEDICARE

## 2018-04-10 PROCEDURE — 93005 ELECTROCARDIOGRAM TRACING: CPT

## 2018-04-10 PROCEDURE — 97110 THERAPEUTIC EXERCISES: CPT

## 2018-04-10 ASSESSMENT — PAIN DESCRIPTION - PROGRESSION: CLINICAL_PROGRESSION: RAPIDLY IMPROVING

## 2018-04-12 ENCOUNTER — HOSPITAL ENCOUNTER (OUTPATIENT)
Dept: PHYSICAL THERAPY | Age: 68
Setting detail: THERAPIES SERIES
Discharge: HOME | End: 2018-04-12
Payer: MEDICARE

## 2018-04-12 PROCEDURE — 97110 THERAPEUTIC EXERCISES: CPT

## 2018-04-12 ASSESSMENT — PAIN DESCRIPTION - PROGRESSION: CLINICAL_PROGRESSION: RAPIDLY IMPROVING

## 2018-04-17 ENCOUNTER — HOSPITAL ENCOUNTER (OUTPATIENT)
Dept: PHYSICAL THERAPY | Age: 68
Setting detail: THERAPIES SERIES
Discharge: HOME | End: 2018-04-17
Payer: MEDICARE

## 2018-04-17 PROCEDURE — 97110 THERAPEUTIC EXERCISES: CPT

## 2018-04-17 ASSESSMENT — PAIN DESCRIPTION - PROGRESSION: CLINICAL_PROGRESSION: RAPIDLY IMPROVING

## 2018-04-19 ENCOUNTER — HOSPITAL ENCOUNTER (OUTPATIENT)
Dept: PHYSICAL THERAPY | Age: 68
Setting detail: THERAPIES SERIES
Discharge: HOME | End: 2018-04-19
Payer: MEDICARE

## 2018-04-19 PROCEDURE — G8979 MOBILITY GOAL STATUS: HCPCS

## 2018-04-19 PROCEDURE — 97110 THERAPEUTIC EXERCISES: CPT

## 2018-04-19 PROCEDURE — G8980 MOBILITY D/C STATUS: HCPCS

## 2018-05-22 DIAGNOSIS — I10 ESSENTIAL HYPERTENSION: ICD-10-CM

## 2018-05-22 RX ORDER — ATENOLOL 50 MG/1
50 TABLET ORAL DAILY
Qty: 30 TABLET | Refills: 5 | Status: SHIPPED | OUTPATIENT
Start: 2018-05-22 | End: 2018-08-16 | Stop reason: SDUPTHER

## 2018-08-16 ENCOUNTER — OFFICE VISIT (OUTPATIENT)
Dept: FAMILY MEDICINE CLINIC | Age: 68
End: 2018-08-16
Payer: MEDICARE

## 2018-08-16 VITALS
TEMPERATURE: 97.9 F | WEIGHT: 148.8 LBS | HEART RATE: 49 BPM | OXYGEN SATURATION: 99 % | HEIGHT: 62 IN | DIASTOLIC BLOOD PRESSURE: 70 MMHG | BODY MASS INDEX: 27.38 KG/M2 | SYSTOLIC BLOOD PRESSURE: 106 MMHG

## 2018-08-16 DIAGNOSIS — L71.9 ROSACEA: ICD-10-CM

## 2018-08-16 DIAGNOSIS — Z12.11 COLON CANCER SCREENING: ICD-10-CM

## 2018-08-16 DIAGNOSIS — H00.014 HORDEOLUM EXTERNUM OF LEFT UPPER EYELID: ICD-10-CM

## 2018-08-16 DIAGNOSIS — I10 ESSENTIAL HYPERTENSION: Primary | ICD-10-CM

## 2018-08-16 DIAGNOSIS — M85.80 OSTEOPENIA, UNSPECIFIED LOCATION: ICD-10-CM

## 2018-08-16 PROCEDURE — 1090F PRES/ABSN URINE INCON ASSESS: CPT | Performed by: NURSE PRACTITIONER

## 2018-08-16 PROCEDURE — 3017F COLORECTAL CA SCREEN DOC REV: CPT | Performed by: NURSE PRACTITIONER

## 2018-08-16 PROCEDURE — 99214 OFFICE O/P EST MOD 30 MIN: CPT | Performed by: NURSE PRACTITIONER

## 2018-08-16 PROCEDURE — G8419 CALC BMI OUT NRM PARAM NOF/U: HCPCS | Performed by: NURSE PRACTITIONER

## 2018-08-16 PROCEDURE — G8427 DOCREV CUR MEDS BY ELIG CLIN: HCPCS | Performed by: NURSE PRACTITIONER

## 2018-08-16 PROCEDURE — G8399 PT W/DXA RESULTS DOCUMENT: HCPCS | Performed by: NURSE PRACTITIONER

## 2018-08-16 PROCEDURE — 4040F PNEUMOC VAC/ADMIN/RCVD: CPT | Performed by: NURSE PRACTITIONER

## 2018-08-16 PROCEDURE — 1101F PT FALLS ASSESS-DOCD LE1/YR: CPT | Performed by: NURSE PRACTITIONER

## 2018-08-16 PROCEDURE — 1036F TOBACCO NON-USER: CPT | Performed by: NURSE PRACTITIONER

## 2018-08-16 PROCEDURE — 1123F ACP DISCUSS/DSCN MKR DOCD: CPT | Performed by: NURSE PRACTITIONER

## 2018-08-16 RX ORDER — ERYTHROMYCIN 5 MG/G
OINTMENT OPHTHALMIC 3 TIMES DAILY
Qty: 1 TUBE | Refills: 0 | Status: SHIPPED | OUTPATIENT
Start: 2018-08-16 | End: 2018-08-26

## 2018-08-16 RX ORDER — ALENDRONATE SODIUM 70 MG/1
70 TABLET ORAL
Qty: 4 TABLET | Refills: 12 | Status: SHIPPED | OUTPATIENT
Start: 2018-08-16 | End: 2019-05-02 | Stop reason: SDUPTHER

## 2018-08-16 RX ORDER — ATENOLOL 50 MG/1
50 TABLET ORAL DAILY
Qty: 30 TABLET | Refills: 5 | Status: SHIPPED | OUTPATIENT
Start: 2018-08-16 | End: 2018-11-16 | Stop reason: SDUPTHER

## 2018-08-16 RX ORDER — METRONIDAZOLE 7.5 MG/G
GEL TOPICAL
Qty: 1 TUBE | Refills: 1 | Status: SHIPPED | OUTPATIENT
Start: 2018-08-16 | End: 2019-05-16 | Stop reason: SDUPTHER

## 2018-08-16 ASSESSMENT — ENCOUNTER SYMPTOMS
ORTHOPNEA: 0
COUGH: 0
SHORTNESS OF BREATH: 0

## 2018-08-16 NOTE — PROGRESS NOTES
Prescriptions on File Prior to Visit   Medication Sig Dispense Refill    acetaminophen (TYLENOL) 650 MG CR tablet Take 650 mg by mouth every 8 hours as needed for Pain       No current facility-administered medications on file prior to visit. Allergies   Allergen Reactions    Codeine Rash       Review of Systems   Constitutional: Negative for chills, diaphoresis, fatigue, fever and malaise/fatigue. Respiratory: Negative for cough and shortness of breath. Cardiovascular: Negative for chest pain, palpitations, orthopnea and leg swelling. Musculoskeletal: Negative for arthralgias. Neurological: Negative for dizziness and headaches. Objective  Vitals:    08/16/18 1050   BP: 106/70   Site: Left Arm   Position: Sitting   Cuff Size: Medium Adult   Pulse: (!) 49   Temp: 97.9 °F (36.6 °C)   TempSrc: Tympanic   SpO2: 99%   Weight: 148 lb 12.8 oz (67.5 kg)   Height: 5' 2\" (1.575 m)     Physical Exam   Constitutional: She is oriented to person, place, and time. She appears well-developed and well-nourished. No distress. HENT:   Head: Normocephalic and atraumatic. Right Ear: Tympanic membrane, external ear and ear canal normal.   Left Ear: Tympanic membrane, external ear and ear canal normal.   Nose: Nose normal.   Mouth/Throat: Oropharynx is clear and moist. No oropharyngeal exudate. Eyes: Pupils are equal, round, and reactive to light. Conjunctivae are normal. Left eye exhibits hordeolum (left upper eyelid). Neck: Normal range of motion. Neck supple. Cardiovascular: Normal rate, regular rhythm and normal heart sounds. Pulmonary/Chest: Effort normal and breath sounds normal. No respiratory distress. Lymphadenopathy:     She has no cervical adenopathy. Neurological: She is alert and oriented to person, place, and time. No cranial nerve deficit. Skin: Skin is warm and dry. No rash noted. She is not diaphoretic. No erythema. No pallor. Psychiatric: She has a normal mood and affect.  Her

## 2018-10-09 ENCOUNTER — NURSE ONLY (OUTPATIENT)
Dept: FAMILY MEDICINE CLINIC | Age: 68
End: 2018-10-09
Payer: MEDICARE

## 2018-10-09 ENCOUNTER — TELEPHONE (OUTPATIENT)
Dept: FAMILY MEDICINE CLINIC | Age: 68
End: 2018-10-09

## 2018-10-09 DIAGNOSIS — Z12.11 COLON CANCER SCREENING: ICD-10-CM

## 2018-10-09 DIAGNOSIS — Z23 INFLUENZA VACCINE NEEDED: Primary | ICD-10-CM

## 2018-10-09 LAB
CONTROL: NORMAL
HEMOCCULT STL QL: NORMAL

## 2018-10-09 PROCEDURE — 82274 ASSAY TEST FOR BLOOD FECAL: CPT | Performed by: NURSE PRACTITIONER

## 2018-10-09 PROCEDURE — G0008 ADMIN INFLUENZA VIRUS VAC: HCPCS | Performed by: FAMILY MEDICINE

## 2018-10-09 PROCEDURE — 90662 IIV NO PRSV INCREASED AG IM: CPT | Performed by: FAMILY MEDICINE

## 2018-10-09 NOTE — PROGRESS NOTES
Vaccine Information Sheet, \"Influenza - Inactivated\"  given to Dimas Neighbours, or parent/legal guardian of  Dimas Neighbours and verbalized understanding. Patient responses:    Have you ever had a reaction to a flu vaccine? No  Are you able to eat eggs without adverse effects? Yes  Do you have any current illness? No  Have you ever had Guillian Newsoms Syndrome? No    Flu vaccine given per order. Please see immunization tab.

## 2018-11-16 DIAGNOSIS — I10 ESSENTIAL HYPERTENSION: ICD-10-CM

## 2018-11-19 RX ORDER — ATENOLOL 50 MG/1
50 TABLET ORAL DAILY
Qty: 30 TABLET | Refills: 5 | Status: SHIPPED | OUTPATIENT
Start: 2018-11-19 | End: 2019-05-02 | Stop reason: SDUPTHER

## 2019-02-18 ENCOUNTER — OFFICE VISIT (OUTPATIENT)
Dept: FAMILY MEDICINE CLINIC | Age: 69
End: 2019-02-18
Payer: MEDICARE

## 2019-02-18 VITALS
HEIGHT: 63 IN | TEMPERATURE: 97.3 F | HEART RATE: 66 BPM | WEIGHT: 151 LBS | SYSTOLIC BLOOD PRESSURE: 110 MMHG | DIASTOLIC BLOOD PRESSURE: 82 MMHG | OXYGEN SATURATION: 99 % | BODY MASS INDEX: 26.75 KG/M2

## 2019-02-18 DIAGNOSIS — Z12.39 BREAST CANCER SCREENING: ICD-10-CM

## 2019-02-18 DIAGNOSIS — Z11.59 NEED FOR HEPATITIS C SCREENING TEST: ICD-10-CM

## 2019-02-18 DIAGNOSIS — I10 ESSENTIAL HYPERTENSION: ICD-10-CM

## 2019-02-18 DIAGNOSIS — Z13.220 LIPID SCREENING: ICD-10-CM

## 2019-02-18 DIAGNOSIS — I10 ESSENTIAL HYPERTENSION: Primary | ICD-10-CM

## 2019-02-18 LAB
ALT SERPL-CCNC: 14 U/L (ref 0–33)
ANION GAP SERPL CALCULATED.3IONS-SCNC: 10 MEQ/L (ref 9–15)
AST SERPL-CCNC: 16 U/L (ref 0–35)
BASOPHILS ABSOLUTE: 0 K/UL (ref 0–0.2)
BASOPHILS RELATIVE PERCENT: 0.5 %
BUN BLDV-MCNC: 13 MG/DL (ref 8–23)
CALCIUM SERPL-MCNC: 10 MG/DL (ref 8.5–9.9)
CHLORIDE BLD-SCNC: 101 MEQ/L (ref 95–107)
CHOLESTEROL, TOTAL: 163 MG/DL (ref 0–199)
CO2: 28 MEQ/L (ref 20–31)
CREAT SERPL-MCNC: 0.57 MG/DL (ref 0.5–0.9)
EOSINOPHILS ABSOLUTE: 0.1 K/UL (ref 0–0.7)
EOSINOPHILS RELATIVE PERCENT: 0.9 %
GFR AFRICAN AMERICAN: >60
GFR NON-AFRICAN AMERICAN: >60
GLUCOSE BLD-MCNC: 93 MG/DL (ref 70–99)
HCT VFR BLD CALC: 36.2 % (ref 37–47)
HDLC SERPL-MCNC: 45 MG/DL (ref 40–59)
HEMOGLOBIN: 12.5 G/DL (ref 12–16)
HEPATITIS C ANTIBODY INTERPRETATION: NORMAL
LDL CHOLESTEROL CALCULATED: 94 MG/DL (ref 0–129)
LYMPHOCYTES ABSOLUTE: 1.2 K/UL (ref 1–4.8)
LYMPHOCYTES RELATIVE PERCENT: 16.4 %
MCH RBC QN AUTO: 33.3 PG (ref 27–31.3)
MCHC RBC AUTO-ENTMCNC: 34.6 % (ref 33–37)
MCV RBC AUTO: 96.2 FL (ref 82–100)
MONOCYTES ABSOLUTE: 0.4 K/UL (ref 0.2–0.8)
MONOCYTES RELATIVE PERCENT: 5.7 %
NEUTROPHILS ABSOLUTE: 5.8 K/UL (ref 1.4–6.5)
NEUTROPHILS RELATIVE PERCENT: 76.5 %
PDW BLD-RTO: 13.1 % (ref 11.5–14.5)
PLATELET # BLD: 244 K/UL (ref 130–400)
POTASSIUM SERPL-SCNC: 4.7 MEQ/L (ref 3.4–4.9)
RBC # BLD: 3.76 M/UL (ref 4.2–5.4)
SODIUM BLD-SCNC: 139 MEQ/L (ref 135–144)
TRIGL SERPL-MCNC: 121 MG/DL (ref 0–150)
WBC # BLD: 7.6 K/UL (ref 4.8–10.8)

## 2019-02-18 PROCEDURE — G8399 PT W/DXA RESULTS DOCUMENT: HCPCS | Performed by: NURSE PRACTITIONER

## 2019-02-18 PROCEDURE — 1123F ACP DISCUSS/DSCN MKR DOCD: CPT | Performed by: NURSE PRACTITIONER

## 2019-02-18 PROCEDURE — G8419 CALC BMI OUT NRM PARAM NOF/U: HCPCS | Performed by: NURSE PRACTITIONER

## 2019-02-18 PROCEDURE — G8427 DOCREV CUR MEDS BY ELIG CLIN: HCPCS | Performed by: NURSE PRACTITIONER

## 2019-02-18 PROCEDURE — G8510 SCR DEP NEG, NO PLAN REQD: HCPCS | Performed by: NURSE PRACTITIONER

## 2019-02-18 PROCEDURE — 1101F PT FALLS ASSESS-DOCD LE1/YR: CPT | Performed by: NURSE PRACTITIONER

## 2019-02-18 PROCEDURE — G8482 FLU IMMUNIZE ORDER/ADMIN: HCPCS | Performed by: NURSE PRACTITIONER

## 2019-02-18 PROCEDURE — 1036F TOBACCO NON-USER: CPT | Performed by: NURSE PRACTITIONER

## 2019-02-18 PROCEDURE — 1090F PRES/ABSN URINE INCON ASSESS: CPT | Performed by: NURSE PRACTITIONER

## 2019-02-18 PROCEDURE — 4040F PNEUMOC VAC/ADMIN/RCVD: CPT | Performed by: NURSE PRACTITIONER

## 2019-02-18 PROCEDURE — 99213 OFFICE O/P EST LOW 20 MIN: CPT | Performed by: NURSE PRACTITIONER

## 2019-02-18 PROCEDURE — 3017F COLORECTAL CA SCREEN DOC REV: CPT | Performed by: NURSE PRACTITIONER

## 2019-02-18 ASSESSMENT — ENCOUNTER SYMPTOMS
CHEST TIGHTNESS: 0
RESPIRATORY NEGATIVE: 1
BLURRED VISION: 0
SHORTNESS OF BREATH: 0

## 2019-02-18 ASSESSMENT — PATIENT HEALTH QUESTIONNAIRE - PHQ9
2. FEELING DOWN, DEPRESSED OR HOPELESS: 0
1. LITTLE INTEREST OR PLEASURE IN DOING THINGS: 0
SUM OF ALL RESPONSES TO PHQ9 QUESTIONS 1 & 2: 0
SUM OF ALL RESPONSES TO PHQ QUESTIONS 1-9: 0
SUM OF ALL RESPONSES TO PHQ QUESTIONS 1-9: 0

## 2019-05-02 DIAGNOSIS — M85.80 OSTEOPENIA, UNSPECIFIED LOCATION: ICD-10-CM

## 2019-05-02 DIAGNOSIS — I10 ESSENTIAL HYPERTENSION: ICD-10-CM

## 2019-05-02 RX ORDER — ALENDRONATE SODIUM 70 MG/1
70 TABLET ORAL
Qty: 4 TABLET | Refills: 12 | Status: SHIPPED | OUTPATIENT
Start: 2019-05-02 | End: 2019-08-20 | Stop reason: SDUPTHER

## 2019-05-02 RX ORDER — ATENOLOL 50 MG/1
50 TABLET ORAL DAILY
Qty: 90 TABLET | Refills: 1 | Status: SHIPPED | OUTPATIENT
Start: 2019-05-02 | End: 2019-08-20 | Stop reason: SDUPTHER

## 2019-05-02 NOTE — TELEPHONE ENCOUNTER
Pt will be going out of town for two months starting in June, the fosamax will run out by the end of July. The  Atenolol needs to be a 90 day supply. lov 2/18/19.

## 2019-05-16 DIAGNOSIS — L71.9 ROSACEA: ICD-10-CM

## 2019-05-17 RX ORDER — METRONIDAZOLE 7.5 MG/G
GEL TOPICAL
Qty: 1 TUBE | Refills: 1 | Status: ON HOLD | OUTPATIENT
Start: 2019-05-17 | End: 2020-08-16 | Stop reason: HOSPADM

## 2019-08-13 ENCOUNTER — HOSPITAL ENCOUNTER (OUTPATIENT)
Dept: WOMENS IMAGING | Age: 69
Discharge: HOME | End: 2019-08-15
Payer: MEDICARE

## 2019-08-13 DIAGNOSIS — Z12.39 BREAST CANCER SCREENING: ICD-10-CM

## 2019-08-13 PROCEDURE — 77063 BREAST TOMOSYNTHESIS BI: CPT

## 2019-08-20 ENCOUNTER — OFFICE VISIT (OUTPATIENT)
Dept: FAMILY MEDICINE CLINIC | Age: 69
End: 2019-08-20
Payer: MEDICARE

## 2019-08-20 VITALS
HEIGHT: 62 IN | SYSTOLIC BLOOD PRESSURE: 118 MMHG | BODY MASS INDEX: 28.52 KG/M2 | DIASTOLIC BLOOD PRESSURE: 80 MMHG | TEMPERATURE: 97.5 F | HEART RATE: 53 BPM | OXYGEN SATURATION: 97 % | WEIGHT: 155 LBS

## 2019-08-20 DIAGNOSIS — M85.80 OSTEOPENIA, UNSPECIFIED LOCATION: ICD-10-CM

## 2019-08-20 DIAGNOSIS — I10 ESSENTIAL HYPERTENSION: ICD-10-CM

## 2019-08-20 PROCEDURE — 3017F COLORECTAL CA SCREEN DOC REV: CPT | Performed by: NURSE PRACTITIONER

## 2019-08-20 PROCEDURE — 1090F PRES/ABSN URINE INCON ASSESS: CPT | Performed by: NURSE PRACTITIONER

## 2019-08-20 PROCEDURE — 4040F PNEUMOC VAC/ADMIN/RCVD: CPT | Performed by: NURSE PRACTITIONER

## 2019-08-20 PROCEDURE — G8399 PT W/DXA RESULTS DOCUMENT: HCPCS | Performed by: NURSE PRACTITIONER

## 2019-08-20 PROCEDURE — 99213 OFFICE O/P EST LOW 20 MIN: CPT | Performed by: NURSE PRACTITIONER

## 2019-08-20 PROCEDURE — G8419 CALC BMI OUT NRM PARAM NOF/U: HCPCS | Performed by: NURSE PRACTITIONER

## 2019-08-20 PROCEDURE — 1036F TOBACCO NON-USER: CPT | Performed by: NURSE PRACTITIONER

## 2019-08-20 PROCEDURE — G8427 DOCREV CUR MEDS BY ELIG CLIN: HCPCS | Performed by: NURSE PRACTITIONER

## 2019-08-20 PROCEDURE — 1123F ACP DISCUSS/DSCN MKR DOCD: CPT | Performed by: NURSE PRACTITIONER

## 2019-08-20 RX ORDER — ALENDRONATE SODIUM 70 MG/1
70 TABLET ORAL
Qty: 4 TABLET | Refills: 12 | Status: SHIPPED | OUTPATIENT
Start: 2019-08-20 | End: 2020-04-28 | Stop reason: SDUPTHER

## 2019-08-20 RX ORDER — ATENOLOL 50 MG/1
50 TABLET ORAL DAILY
Qty: 90 TABLET | Refills: 1 | Status: SHIPPED | OUTPATIENT
Start: 2019-08-20 | End: 2020-04-28 | Stop reason: SDUPTHER

## 2019-08-20 ASSESSMENT — ENCOUNTER SYMPTOMS
SHORTNESS OF BREATH: 0
BLURRED VISION: 0
COLOR CHANGE: 0

## 2019-08-20 NOTE — PROGRESS NOTES
External ear normal.   Left Ear: External ear normal.   Mouth/Throat: Oropharynx is clear and moist.   Cardiovascular: Normal rate, regular rhythm, normal heart sounds and intact distal pulses. Pulmonary/Chest: Effort normal and breath sounds normal. No respiratory distress. Musculoskeletal: Normal range of motion. She exhibits no edema. Neurological: She is alert and oriented to person, place, and time. No cranial nerve deficit. Skin: Skin is warm and dry. Capillary refill takes less than 2 seconds. No rash noted. She is not diaphoretic. No erythema. No pallor. Psychiatric: She has a normal mood and affect. Her behavior is normal. Judgment and thought content normal.       Assessment& Plan    1. Essential hypertension  Will repeat labs yearly. Continue to exercise and watch sodium in diet. Continue current dose of Atenolol.  - atenolol (TENORMIN) 50 MG tablet; Take 1 tablet by mouth daily  Dispense: 90 tablet; Refill: 1    2. Osteopenia, unspecified location  Continue current dose of fosamax.  - alendronate (FOSAMAX) 70 MG tablet; Take 1 tablet by mouth every 7 days  Dispense: 4 tablet; Refill: 12    Refusing pneumonia vaccine. No orders of the defined types were placed in this encounter. Orders Placed This Encounter   Medications    atenolol (TENORMIN) 50 MG tablet     Sig: Take 1 tablet by mouth daily     Dispense:  90 tablet     Refill:  1    alendronate (FOSAMAX) 70 MG tablet     Sig: Take 1 tablet by mouth every 7 days     Dispense:  4 tablet     Refill:  12       Return in about 6 months (around 2/20/2020) for HTN. Side effects, adverse effects of the medication prescribed today, as well as treatment plan/ rationale and result expectations have been discussed with the patient who expresses understanding and desires to proceed. Close follow up to evaluate treatment results and for coordination of care.   I have reviewed the patient's medical history in detail and updated the

## 2019-10-29 ENCOUNTER — NURSE ONLY (OUTPATIENT)
Dept: FAMILY MEDICINE CLINIC | Age: 69
End: 2019-10-29
Payer: MEDICARE

## 2019-10-29 DIAGNOSIS — Z23 INFLUENZA VACCINE NEEDED: Primary | ICD-10-CM

## 2019-10-29 PROCEDURE — 90653 IIV ADJUVANT VACCINE IM: CPT | Performed by: NURSE PRACTITIONER

## 2019-10-29 PROCEDURE — G0008 ADMIN INFLUENZA VIRUS VAC: HCPCS | Performed by: NURSE PRACTITIONER

## 2020-02-13 ENCOUNTER — OFFICE VISIT (OUTPATIENT)
Dept: FAMILY MEDICINE CLINIC | Age: 70
End: 2020-02-13
Payer: MEDICARE

## 2020-02-13 VITALS
HEIGHT: 62 IN | WEIGHT: 160 LBS | BODY MASS INDEX: 29.44 KG/M2 | OXYGEN SATURATION: 98 % | TEMPERATURE: 97.4 F | DIASTOLIC BLOOD PRESSURE: 84 MMHG | SYSTOLIC BLOOD PRESSURE: 136 MMHG | HEART RATE: 54 BPM

## 2020-02-13 DIAGNOSIS — I10 ESSENTIAL HYPERTENSION: ICD-10-CM

## 2020-02-13 DIAGNOSIS — Z13.220 LIPID SCREENING: ICD-10-CM

## 2020-02-13 LAB
ALBUMIN SERPL-MCNC: 4.5 G/DL (ref 3.5–4.6)
ALP BLD-CCNC: 62 U/L (ref 40–130)
ALT SERPL-CCNC: 18 U/L (ref 0–33)
ANION GAP SERPL CALCULATED.3IONS-SCNC: 16 MEQ/L (ref 9–15)
AST SERPL-CCNC: 17 U/L (ref 0–35)
BASOPHILS ABSOLUTE: 0.1 K/UL (ref 0–0.2)
BASOPHILS RELATIVE PERCENT: 0.7 %
BILIRUB SERPL-MCNC: 0.6 MG/DL (ref 0.2–0.7)
BUN BLDV-MCNC: 16 MG/DL (ref 8–23)
CALCIUM SERPL-MCNC: 10.2 MG/DL (ref 8.5–9.9)
CHLORIDE BLD-SCNC: 98 MEQ/L (ref 95–107)
CHOLESTEROL, TOTAL: 156 MG/DL (ref 0–199)
CO2: 26 MEQ/L (ref 20–31)
CREAT SERPL-MCNC: 0.69 MG/DL (ref 0.5–0.9)
EOSINOPHILS ABSOLUTE: 0.1 K/UL (ref 0–0.7)
EOSINOPHILS RELATIVE PERCENT: 1.5 %
GFR AFRICAN AMERICAN: >60
GFR NON-AFRICAN AMERICAN: >60
GLOBULIN: 3.7 G/DL (ref 2.3–3.5)
GLUCOSE BLD-MCNC: 58 MG/DL (ref 70–99)
HCT VFR BLD CALC: 36.9 % (ref 37–47)
HDLC SERPL-MCNC: 42 MG/DL (ref 40–59)
HEMOGLOBIN: 12.7 G/DL (ref 12–16)
LDL CHOLESTEROL CALCULATED: 90 MG/DL (ref 0–129)
LYMPHOCYTES ABSOLUTE: 1.3 K/UL (ref 1–4.8)
LYMPHOCYTES RELATIVE PERCENT: 16.2 %
MCH RBC QN AUTO: 33.7 PG (ref 27–31.3)
MCHC RBC AUTO-ENTMCNC: 34.5 % (ref 33–37)
MCV RBC AUTO: 97.7 FL (ref 82–100)
MONOCYTES ABSOLUTE: 0.5 K/UL (ref 0.2–0.8)
MONOCYTES RELATIVE PERCENT: 6.6 %
NEUTROPHILS ABSOLUTE: 5.8 K/UL (ref 1.4–6.5)
NEUTROPHILS RELATIVE PERCENT: 75 %
PDW BLD-RTO: 13.1 % (ref 11.5–14.5)
PLATELET # BLD: 255 K/UL (ref 130–400)
POTASSIUM SERPL-SCNC: 4.9 MEQ/L (ref 3.4–4.9)
RBC # BLD: 3.77 M/UL (ref 4.2–5.4)
SODIUM BLD-SCNC: 140 MEQ/L (ref 135–144)
TOTAL PROTEIN: 8.2 G/DL (ref 6.3–8)
TRIGL SERPL-MCNC: 122 MG/DL (ref 0–150)
WBC # BLD: 7.8 K/UL (ref 4.8–10.8)

## 2020-02-13 PROCEDURE — 99213 OFFICE O/P EST LOW 20 MIN: CPT | Performed by: NURSE PRACTITIONER

## 2020-02-13 PROCEDURE — 3017F COLORECTAL CA SCREEN DOC REV: CPT | Performed by: NURSE PRACTITIONER

## 2020-02-13 PROCEDURE — G0438 PPPS, INITIAL VISIT: HCPCS | Performed by: NURSE PRACTITIONER

## 2020-02-13 PROCEDURE — 4040F PNEUMOC VAC/ADMIN/RCVD: CPT | Performed by: NURSE PRACTITIONER

## 2020-02-13 PROCEDURE — G8482 FLU IMMUNIZE ORDER/ADMIN: HCPCS | Performed by: NURSE PRACTITIONER

## 2020-02-13 PROCEDURE — 1123F ACP DISCUSS/DSCN MKR DOCD: CPT | Performed by: NURSE PRACTITIONER

## 2020-02-13 SDOH — ECONOMIC STABILITY: FOOD INSECURITY: WITHIN THE PAST 12 MONTHS, THE FOOD YOU BOUGHT JUST DIDN'T LAST AND YOU DIDN'T HAVE MONEY TO GET MORE.: NEVER TRUE

## 2020-02-13 SDOH — ECONOMIC STABILITY: FOOD INSECURITY: WITHIN THE PAST 12 MONTHS, YOU WORRIED THAT YOUR FOOD WOULD RUN OUT BEFORE YOU GOT MONEY TO BUY MORE.: NEVER TRUE

## 2020-02-13 SDOH — ECONOMIC STABILITY: TRANSPORTATION INSECURITY
IN THE PAST 12 MONTHS, HAS LACK OF TRANSPORTATION KEPT YOU FROM MEETINGS, WORK, OR FROM GETTING THINGS NEEDED FOR DAILY LIVING?: NO

## 2020-02-13 SDOH — ECONOMIC STABILITY: TRANSPORTATION INSECURITY
IN THE PAST 12 MONTHS, HAS THE LACK OF TRANSPORTATION KEPT YOU FROM MEDICAL APPOINTMENTS OR FROM GETTING MEDICATIONS?: NO

## 2020-02-13 SDOH — ECONOMIC STABILITY: INCOME INSECURITY: HOW HARD IS IT FOR YOU TO PAY FOR THE VERY BASICS LIKE FOOD, HOUSING, MEDICAL CARE, AND HEATING?: NOT HARD AT ALL

## 2020-02-13 ASSESSMENT — ENCOUNTER SYMPTOMS
ABDOMINAL PAIN: 0
SHORTNESS OF BREATH: 0
COUGH: 0

## 2020-02-13 ASSESSMENT — PATIENT HEALTH QUESTIONNAIRE - PHQ9
SUM OF ALL RESPONSES TO PHQ QUESTIONS 1-9: 0
SUM OF ALL RESPONSES TO PHQ QUESTIONS 1-9: 0

## 2020-02-13 NOTE — PROGRESS NOTES
car?: Yes  Safety Interventions:  · Home safety tips provided    Personalized Preventive Plan   Current Health Maintenance Status  Immunization History   Administered Date(s) Administered    Influenza Vaccine, unspecified formulation 11/11/2015    Influenza Virus Vaccine 11/05/2013, 11/10/2014    Influenza, High Dose (Fluzone 65 yrs and older) 11/11/2015, 01/22/2018, 10/09/2018    Influenza, Quadv, IM, PF (6 mo and older Fluzone, Flulaval, Fluarix, and 3 yrs and older Afluria) 12/13/2016    Influenza, Triv, inactivated, subunit, adjuvanted, IM (Fluad 65 yrs and older) 10/29/2019        Health Maintenance   Topic Date Due    DTaP/Tdap/Td vaccine (1 - Tdap) 05/05/1961    Shingles Vaccine (1 of 2) 05/05/1998    Annual Wellness Visit (AWV)  05/29/2019    Colon Cancer Screen FIT/FOBT  10/09/2019    Pneumococcal 65+ years Vaccine (1 of 1 - PPSV23) 03/06/2020 (Originally 5/5/2013)    Breast cancer screen  08/13/2020    Lipid screen  02/18/2024    Flu vaccine  Completed    Hepatitis C screen  Completed    DEXA (modify frequency per FRAX score)  Addressed    Hepatitis A vaccine  Aged Out    Hepatitis B vaccine  Aged Out    Hib vaccine  Aged Out    Meningococcal (ACWY) vaccine  Aged Out     Recommendations for [employer] [facility] Due: see orders and patient instructions/AVS.  . Recommended screening schedule for the next 5-10 years is provided to the patient in written form: see Patient Sergio Fisher was seen today for medicare awv and 6 month follow-up. Diagnoses and all orders for this visit:    Routine general medical examination at a health care facility    Colon cancer screening  -     POCT Fecal Immunochemical Test (Fit); Future    Essential hypertension  -     CBC With Auto Differential; Future  -     Comprehensive Metabolic Panel; Future    Lipid screening  -     Lipid Panel;  Future          Subjective  Chief Complaint   Patient presents with    Medicare AWV    6 Month Follow-Up None   Lifestyle    Physical activity:     Days per week: None     Minutes per session: None    Stress: None   Relationships    Social connections:     Talks on phone: None     Gets together: None     Attends Spiritism service: None     Active member of club or organization: None     Attends meetings of clubs or organizations: None     Relationship status: None    Intimate partner violence:     Fear of current or ex partner: None     Emotionally abused: None     Physically abused: None     Forced sexual activity: None   Other Topics Concern    None   Social History Narrative    None     Current Outpatient Medications on File Prior to Visit   Medication Sig Dispense Refill    atenolol (TENORMIN) 50 MG tablet Take 1 tablet by mouth daily 90 tablet 1    alendronate (FOSAMAX) 70 MG tablet Take 1 tablet by mouth every 7 days 4 tablet 12    metroNIDAZOLE (METROGEL) 0.75 % gel Apply topically 2 times daily. 1 Tube 1    acetaminophen (TYLENOL) 650 MG CR tablet Take 650 mg by mouth every 8 hours as needed for Pain       No current facility-administered medications on file prior to visit. Allergies   Allergen Reactions    Codeine Rash       Review of Systems   Constitutional: Negative for chills, diaphoresis, fatigue, fever and malaise/fatigue. HENT: Negative for congestion. Eyes: Negative for visual disturbance. Respiratory: Negative for cough and shortness of breath. Cardiovascular: Negative for chest pain, palpitations and leg swelling. Gastrointestinal: Negative for abdominal pain. Genitourinary: Negative for dysuria. Musculoskeletal: Positive for arthralgias. Skin: Negative for rash. Neurological: Negative for dizziness and headaches. Psychiatric/Behavioral: Negative for dysphoric mood. The patient is not nervous/anxious.         Objective  Vitals:    02/13/20 0909   BP: 136/84   Site: Left Upper Arm   Position: Sitting   Cuff Size: Medium Adult   Pulse: 54   Temp: 97.4 °F (36.3 °C) TempSrc: Tympanic   SpO2: 98%   Weight: 160 lb (72.6 kg)   Height: 5' 2\" (1.575 m)     Physical Exam  Constitutional:       General: She is not in acute distress. Appearance: Normal appearance. She is normal weight. She is not ill-appearing, toxic-appearing or diaphoretic. HENT:      Head: Normocephalic. Right Ear: Tympanic membrane, ear canal and external ear normal. There is no impacted cerumen. Left Ear: Tympanic membrane, ear canal and external ear normal. There is no impacted cerumen. Nose: Nose normal. No congestion. Mouth/Throat:      Mouth: Mucous membranes are moist.      Pharynx: Oropharynx is clear. No oropharyngeal exudate or posterior oropharyngeal erythema. Eyes:      Extraocular Movements: Extraocular movements intact. Conjunctiva/sclera: Conjunctivae normal.      Pupils: Pupils are equal, round, and reactive to light. Neck:      Musculoskeletal: Normal range of motion. Cardiovascular:      Rate and Rhythm: Normal rate and regular rhythm. Pulses: Normal pulses. Heart sounds: Normal heart sounds. No murmur. Pulmonary:      Effort: Pulmonary effort is normal. No respiratory distress. Breath sounds: Normal breath sounds. No stridor. No wheezing, rhonchi or rales. Chest:      Chest wall: No tenderness. Musculoskeletal: Normal range of motion. Right lower leg: No edema. Left lower leg: No edema. Skin:     General: Skin is warm and dry. Capillary Refill: Capillary refill takes less than 2 seconds. Coloration: Skin is not jaundiced or pale. Findings: No bruising, erythema, lesion or rash. Neurological:      General: No focal deficit present. Mental Status: She is alert. Psychiatric:         Mood and Affect: Mood normal.         Behavior: Behavior normal.         Thought Content: Thought content normal.         Judgment: Judgment normal.         Assessment& Plan     Diagnosis Orders   1.  Routine general medical examination at a health care facility     2. Colon cancer screening  POCT Fecal Immunochemical Test (Fit)   3. Essential hypertension  CBC With Auto Differential    Comprehensive Metabolic Panel   4. Lipid screening  Lipid Panel     Check labs as ordered. Patient advised to occasionally monitor blood pressure at home and call office if blood pressure consistently elevated >140/85. Continue with medications as ordered. Watch excess salt intake as it can contribute to elevations in blood pressure. Patient verbalized understanding. Shingles vaccine at pharmacy. FIT test as ordered. F/u in 6 months or sooner PRN. Side effects, adverse effects of the medication prescribed today, as well as treatment plan/ rationale and result expectations have been discussed with the patient who expresses understanding and desires to proceed. Close follow up to evaluate treatment results and for coordination of care. I have reviewed the patient's medical history in detail and updated the computerized patient record. As always, patient is advised that if symptoms worsen in any way they will proceed to the nearest emergency room. Orders Placed This Encounter   Procedures    CBC With Auto Differential     Standing Status:   Future     Standing Expiration Date:   2/13/2021    Comprehensive Metabolic Panel     Standing Status:   Future     Standing Expiration Date:   2/13/2021    Lipid Panel     Standing Status:   Future     Standing Expiration Date:   2/13/2021     Order Specific Question:   Is Patient Fasting?/# of Hours     Answer:   y/12    POCT Fecal Immunochemical Test (Fit)     Standing Status:   Future     Standing Expiration Date:   2/7/2021       No orders of the defined types were placed in this encounter. Return in about 6 months (around 8/13/2020), or htn check up, for Medicare Annual Wellness Visit in 1 year.     Roger Foster, APRN - CNP

## 2020-02-13 NOTE — PATIENT INSTRUCTIONS
Personalized Preventive Plan for [facility] - 2/13/2020  Medicare offers a range of preventive health benefits. Some of the tests and screenings are paid in full while other may be subject to a deductible, co-insurance, and/or copay. Some of these benefits include a comprehensive review of your medical history including lifestyle, illnesses that may run in your family, and various assessments and screenings as appropriate. After reviewing your medical record and screening and assessments performed today your provider may have ordered immunizations, labs, imaging, and/or referrals for you. A list of these orders (if applicable) as well as your Preventive Care list are included within your After Visit Summary for your review. Other Preventive Recommendations:    · A preventive eye exam performed by an eye specialist is recommended every 1-2 years to screen for glaucoma; cataracts, macular degeneration, and other eye disorders. · A preventive dental visit is recommended every 6 months. · Try to get at least 150 minutes of exercise per week or 10,000 steps per day on a pedometer . · Order or download the FREE \"Exercise & Physical Activity: Your Everyday Guide\" from [employer]. Call 7-683.130.6971 or search [employer] online. · You need 3675-9808 mg of calcium and 6222-0688 IU of vitamin D per day. It is possible to meet your calcium requirement with diet alone, but a vitamin D supplement is usually necessary to meet this goal.  · When exposed to the sun, use a sunscreen that protects against both UVA and UVB radiation with an SPF of 30 or greater. Reapply every 2 to 3 hours or after sweating, drying off with a towel, or swimming. · Always wear a seat belt when traveling in a car. Always wear a helmet when riding a bicycle or motorcycle.

## 2020-02-20 LAB
CONTROL: NORMAL
HEMOCCULT STL QL: NEGATIVE

## 2020-02-20 PROCEDURE — 82274 ASSAY TEST FOR BLOOD FECAL: CPT | Performed by: NURSE PRACTITIONER

## 2020-04-28 RX ORDER — ATENOLOL 50 MG/1
50 TABLET ORAL DAILY
Qty: 90 TABLET | Refills: 1 | Status: SHIPPED | OUTPATIENT
Start: 2020-04-28 | End: 2020-12-08 | Stop reason: SDUPTHER

## 2020-04-28 RX ORDER — ALENDRONATE SODIUM 70 MG/1
70 TABLET ORAL
Qty: 4 TABLET | Refills: 12 | Status: SHIPPED | OUTPATIENT
Start: 2020-04-28 | End: 2021-07-09 | Stop reason: SDUPTHER

## 2020-06-19 ENCOUNTER — OFFICE VISIT (OUTPATIENT)
Dept: FAMILY MEDICINE CLINIC | Age: 70
End: 2020-06-19
Payer: MEDICARE

## 2020-06-19 VITALS
WEIGHT: 150 LBS | SYSTOLIC BLOOD PRESSURE: 110 MMHG | TEMPERATURE: 97.3 F | HEIGHT: 62 IN | OXYGEN SATURATION: 99 % | HEART RATE: 56 BPM | BODY MASS INDEX: 27.6 KG/M2 | DIASTOLIC BLOOD PRESSURE: 70 MMHG

## 2020-06-19 PROCEDURE — G8399 PT W/DXA RESULTS DOCUMENT: HCPCS | Performed by: NURSE PRACTITIONER

## 2020-06-19 PROCEDURE — 1036F TOBACCO NON-USER: CPT | Performed by: NURSE PRACTITIONER

## 2020-06-19 PROCEDURE — 1090F PRES/ABSN URINE INCON ASSESS: CPT | Performed by: NURSE PRACTITIONER

## 2020-06-19 PROCEDURE — G8417 CALC BMI ABV UP PARAM F/U: HCPCS | Performed by: NURSE PRACTITIONER

## 2020-06-19 PROCEDURE — 1123F ACP DISCUSS/DSCN MKR DOCD: CPT | Performed by: NURSE PRACTITIONER

## 2020-06-19 PROCEDURE — 3017F COLORECTAL CA SCREEN DOC REV: CPT | Performed by: NURSE PRACTITIONER

## 2020-06-19 PROCEDURE — 90670 PCV13 VACCINE IM: CPT | Performed by: NURSE PRACTITIONER

## 2020-06-19 PROCEDURE — G8427 DOCREV CUR MEDS BY ELIG CLIN: HCPCS | Performed by: NURSE PRACTITIONER

## 2020-06-19 PROCEDURE — 4040F PNEUMOC VAC/ADMIN/RCVD: CPT | Performed by: NURSE PRACTITIONER

## 2020-06-19 PROCEDURE — 99213 OFFICE O/P EST LOW 20 MIN: CPT | Performed by: NURSE PRACTITIONER

## 2020-06-19 PROCEDURE — G0009 ADMIN PNEUMOCOCCAL VACCINE: HCPCS | Performed by: NURSE PRACTITIONER

## 2020-06-19 RX ORDER — METHYLPREDNISOLONE 4 MG/1
TABLET ORAL
Qty: 21 TABLET | Refills: 0 | Status: SHIPPED | OUTPATIENT
Start: 2020-06-19 | End: 2020-06-25

## 2020-06-19 RX ORDER — TIZANIDINE 2 MG/1
2 TABLET ORAL 3 TIMES DAILY PRN
Qty: 30 TABLET | Refills: 0 | Status: SHIPPED | OUTPATIENT
Start: 2020-06-19 | End: 2020-06-26 | Stop reason: SDUPTHER

## 2020-06-19 ASSESSMENT — ENCOUNTER SYMPTOMS
SHORTNESS OF BREATH: 0
BACK PAIN: 1
CHEST TIGHTNESS: 0
BOWEL INCONTINENCE: 0
COUGH: 0

## 2020-06-19 NOTE — PROGRESS NOTES
Subjective  Chief Complaint   Patient presents with    Back Pain     pt states that she pulled muscle in back around Xmas. pt states that she continued to improve until a few wks ago. notes taht she felt something weird in back and since she is getting spasms        Back Pain   This is a new problem. The current episode started 1 to 4 weeks ago (3 weeks). The problem occurs intermittently (not all the time, but a lot). The problem is unchanged. The pain is present in the thoracic spine. Quality: spasm, sharp, feel it rolling. The pain does not radiate. The symptoms are aggravated by lying down (hurts to lie down on her back, can only lay on her side). Pertinent negatives include no bladder incontinence, bowel incontinence, chest pain, dysuria, fever, leg pain, numbness, paresthesias, pelvic pain, tingling or weakness. She has tried heat (tylenol arthritis) for the symptoms. The treatment provided moderate relief.            Patient Active Problem List    Diagnosis Date Noted    Actinic dermatitis     Acne rosacea     Dermatitis     Hypertension     Osteopenia     Anxiety      Past Medical History:   Diagnosis Date    Acne rosacea     Actinic dermatitis     biopsy proven 1/2018    Anxiety     Dermatitis     Hypertension     Osteopenia      Past Surgical History:   Procedure Laterality Date    CATARACT REMOVAL      bilateral     Family History   Problem Relation Age of Onset    Diabetes Mother     Hypertension Mother     Hypertension Father     Breast Cancer Daughter      Social History     Socioeconomic History    Marital status:      Spouse name: Not on file    Number of children: Not on file    Years of education: Not on file    Highest education level: Not on file   Occupational History    Not on file   Social Needs    Financial resource strain: Not hard at all   "[employer] insecurity     Worry: Never true     Inability: Never true   [employer] needs     Medical: No     Non-medical: No   Tobacco Use    Smoking status: Never Smoker    Smokeless tobacco: Never Used   Substance and Sexual Activity    Alcohol use: No     Alcohol/week: 0.0 standard drinks    Drug use: No    Sexual activity: Not on file   Lifestyle    Physical activity     Days per week: Not on file     Minutes per session: Not on file    Stress: Not on file   Relationships    Social connections     Talks on phone: Not on file     Gets together: Not on file     Attends [employer] service: Not on file     Active member of club or organization: Not on file     Attends meetings of clubs or organizations: Not on file     Relationship status: Not on file    Intimate partner violence     Fear of current or ex partner: Not on file     Emotionally abused: Not on file     Physically abused: Not on file     Forced sexual activity: Not on file   Other Topics Concern    Not on file   Social History Narrative    Not on file     Current Outpatient Medications on File Prior to Visit   Medication Sig Dispense Refill    atenolol (TENORMIN) 50 MG tablet Take 1 tablet by mouth daily 90 tablet 1    alendronate (FOSAMAX) 70 MG tablet Take 1 tablet by mouth every 7 days 4 tablet 12    metroNIDAZOLE (METROGEL) 0.75 % gel Apply topically 2 times daily. 1 Tube 1    acetaminophen (TYLENOL) 650 MG CR tablet Take 650 mg by mouth every 8 hours as needed for Pain       No current facility-administered medications on file prior to visit. Allergies   Allergen Reactions    Codeine Rash       Review of Systems   Constitutional: Negative for chills, diaphoresis, fatigue and fever. HENT: Negative for congestion and ear pain. Respiratory: Negative for cough, chest tightness and shortness of breath. Cardiovascular: Negative for chest pain, palpitations and leg swelling. Gastrointestinal: Negative for bowel incontinence. Genitourinary: Negative for bladder incontinence, dysuria and pelvic pain. Musculoskeletal: Positive for back pain. Neurological: Negative for dizziness, tingling, weakness, numbness and paresthesias. Psychiatric/Behavioral: Negative for dysphoric mood. The patient is not nervous/anxious. Objective  Vitals:    06/19/20 1302   BP: 110/70   Pulse: 56   Temp: 97.3 °F (36.3 °C)   SpO2: 99%   Weight: 150 lb (68 kg)   Height: 5' 2\" (1.575 m)     Physical Exam  Constitutional:       General: She is not in acute distress. Appearance: Normal appearance. She is normal weight. She is not ill-appearing, toxic-appearing or diaphoretic. HENT:      Head: Normocephalic and atraumatic. Right Ear: External ear normal.      Left Ear: External ear normal.   Neck:      Musculoskeletal: Normal range of motion and neck supple. No muscular tenderness. Cardiovascular:      Rate and Rhythm: Normal rate and regular rhythm. Pulses: Normal pulses. Heart sounds: Normal heart sounds. No murmur. Pulmonary:      Effort: Pulmonary effort is normal. No respiratory distress. Breath sounds: Normal breath sounds. No stridor. No wheezing, rhonchi or rales. Chest:      Chest wall: No tenderness. Musculoskeletal: Normal range of motion. Thoracic back: She exhibits pain and spasm. She exhibits normal range of motion, no tenderness, no bony tenderness, no swelling, no edema, no deformity, no laceration and normal pulse. Right lower leg: No edema. Left lower leg: No edema. Lymphadenopathy:      Cervical: No cervical adenopathy. Skin:     General: Skin is warm and dry. Capillary Refill: Capillary refill takes less than 2 seconds. Coloration: Skin is not jaundiced or pale. Findings: No bruising, erythema, lesion or rash. Neurological:      General: No focal deficit present. Mental Status: She is alert and oriented to person, place, and time. Psychiatric:         Mood and Affect: Mood normal.         Behavior: Behavior normal.         Thought Content:  Thought content normal.

## 2020-06-26 ENCOUNTER — OFFICE VISIT (OUTPATIENT)
Dept: FAMILY MEDICINE CLINIC | Age: 70
End: 2020-06-26
Payer: MEDICARE

## 2020-06-26 VITALS
OXYGEN SATURATION: 98 % | WEIGHT: 158 LBS | BODY MASS INDEX: 29.08 KG/M2 | HEART RATE: 54 BPM | HEIGHT: 62 IN | TEMPERATURE: 97.2 F

## 2020-06-26 PROCEDURE — 99213 OFFICE O/P EST LOW 20 MIN: CPT | Performed by: NURSE PRACTITIONER

## 2020-06-26 PROCEDURE — G8427 DOCREV CUR MEDS BY ELIG CLIN: HCPCS | Performed by: NURSE PRACTITIONER

## 2020-06-26 PROCEDURE — G8417 CALC BMI ABV UP PARAM F/U: HCPCS | Performed by: NURSE PRACTITIONER

## 2020-06-26 PROCEDURE — 1036F TOBACCO NON-USER: CPT | Performed by: NURSE PRACTITIONER

## 2020-06-26 PROCEDURE — 1123F ACP DISCUSS/DSCN MKR DOCD: CPT | Performed by: NURSE PRACTITIONER

## 2020-06-26 PROCEDURE — 1090F PRES/ABSN URINE INCON ASSESS: CPT | Performed by: NURSE PRACTITIONER

## 2020-06-26 PROCEDURE — G8399 PT W/DXA RESULTS DOCUMENT: HCPCS | Performed by: NURSE PRACTITIONER

## 2020-06-26 PROCEDURE — 4040F PNEUMOC VAC/ADMIN/RCVD: CPT | Performed by: NURSE PRACTITIONER

## 2020-06-26 PROCEDURE — 3017F COLORECTAL CA SCREEN DOC REV: CPT | Performed by: NURSE PRACTITIONER

## 2020-06-26 RX ORDER — TIZANIDINE 2 MG/1
2 TABLET ORAL 3 TIMES DAILY PRN
Qty: 30 TABLET | Refills: 0 | Status: SHIPPED | OUTPATIENT
Start: 2020-06-26 | End: 2020-07-08 | Stop reason: SDUPTHER

## 2020-06-26 RX ORDER — NAPROXEN 500 MG/1
500 TABLET ORAL 2 TIMES DAILY WITH MEALS
Qty: 60 TABLET | Refills: 1 | Status: SHIPPED | OUTPATIENT
Start: 2020-06-26 | End: 2020-07-24 | Stop reason: SDUPTHER

## 2020-06-26 ASSESSMENT — ENCOUNTER SYMPTOMS
SHORTNESS OF BREATH: 0
CHEST TIGHTNESS: 0
BACK PAIN: 1
PHOTOPHOBIA: 0
COUGH: 0
COLOR CHANGE: 0

## 2020-06-26 NOTE — PROGRESS NOTES
Not on file     Active member of club or organization: Not on file     Attends meetings of clubs or organizations: Not on file     Relationship status: Not on file    Intimate partner violence     Fear of current or ex partner: Not on file     Emotionally abused: Not on file     Physically abused: Not on file     Forced sexual activity: Not on file   Other Topics Concern    Not on file   Social History Narrative    Not on file     Current Outpatient Medications on File Prior to Visit   Medication Sig Dispense Refill    atenolol (TENORMIN) 50 MG tablet Take 1 tablet by mouth daily 90 tablet 1    alendronate (FOSAMAX) 70 MG tablet Take 1 tablet by mouth every 7 days 4 tablet 12    metroNIDAZOLE (METROGEL) 0.75 % gel Apply topically 2 times daily. 1 Tube 1    acetaminophen (TYLENOL) 650 MG CR tablet Take 650 mg by mouth every 8 hours as needed for Pain       No current facility-administered medications on file prior to visit. Allergies   Allergen Reactions    Codeine Rash       Review of Systems   Constitutional: Negative for chills, diaphoresis, fatigue and fever. HENT: Negative for congestion and ear pain. Eyes: Negative for photophobia and visual disturbance. Respiratory: Negative for cough, chest tightness and shortness of breath. Cardiovascular: Negative for chest pain, palpitations and leg swelling. Musculoskeletal: Positive for back pain. Negative for arthralgias. Skin: Negative for color change and rash. Neurological: Negative for dizziness and headaches. Psychiatric/Behavioral: Negative for dysphoric mood. The patient is not nervous/anxious. Objective  Vitals:    06/26/20 1106   Pulse: 54   Temp: 97.2 °F (36.2 °C)   TempSrc: Infrared   SpO2: 98%   Weight: 158 lb (71.7 kg)   Height: 5' 2\" (1.575 m)     Physical Exam  Constitutional:       General: She is not in acute distress. Appearance: Normal appearance. She is normal weight.  She is not ill-appearing, toxic-appearing

## 2020-07-08 ENCOUNTER — HOSPITAL ENCOUNTER (OUTPATIENT)
Dept: PHYSICAL THERAPY | Age: 70
Setting detail: THERAPIES SERIES
Discharge: HOME | End: 2020-07-08
Payer: MEDICARE

## 2020-07-08 PROCEDURE — 97162 PT EVAL MOD COMPLEX 30 MIN: CPT

## 2020-07-08 PROCEDURE — 97110 THERAPEUTIC EXERCISES: CPT

## 2020-07-08 PROCEDURE — 97140 MANUAL THERAPY 1/> REGIONS: CPT

## 2020-07-08 RX ORDER — TIZANIDINE 2 MG/1
2 TABLET ORAL 3 TIMES DAILY PRN
Qty: 30 TABLET | Refills: 0 | Status: SHIPPED | OUTPATIENT
Start: 2020-07-08 | End: 2020-07-24 | Stop reason: SDUPTHER

## 2020-07-08 ASSESSMENT — PAIN DESCRIPTION - PROGRESSION: CLINICAL_PROGRESSION: GRADUALLY IMPROVING

## 2020-07-08 ASSESSMENT — PAIN - FUNCTIONAL ASSESSMENT: PAIN_FUNCTIONAL_ASSESSMENT: PREVENTS OR INTERFERES WITH ALL ACTIVE AND SOME PASSIVE ACTIVITIES

## 2020-07-08 ASSESSMENT — PAIN SCALES - GENERAL: PAINLEVEL_OUTOF10: 7

## 2020-07-08 ASSESSMENT — PAIN DESCRIPTION - ORIENTATION: ORIENTATION: LEFT;MID

## 2020-07-08 ASSESSMENT — PAIN DESCRIPTION - DESCRIPTORS: DESCRIPTORS: SHARP;STABBING

## 2020-07-08 ASSESSMENT — PAIN DESCRIPTION - FREQUENCY: FREQUENCY: INTERMITTENT

## 2020-07-08 ASSESSMENT — PAIN DESCRIPTION - ONSET: ONSET: SUDDEN

## 2020-07-08 ASSESSMENT — PAIN DESCRIPTION - LOCATION: LOCATION: RIB CAGE;BACK

## 2020-07-08 ASSESSMENT — PAIN DESCRIPTION - PAIN TYPE: TYPE: CHRONIC PAIN

## 2020-07-08 NOTE — PROGRESS NOTES
[address]  PHYSICAL THERAPY EVALUATION    Date: 2020  Patient Name: Jd Roberts       MRN: [id number]   Account: [de-identified]   : 1950  (71 y.o.)   Gender: female   Referring Practitioner: Ellie Parikh CNP                 Diagnosis: Acute L sided thoracic back pain   Treatment Diagnosis: L sided thoracic pain, decreased postural awareness, decreased B periscpaular strength, decreased thoracic mobility, decreased thoracolumbar arom, and decreased functional activity tolerance     Past Medical History:  has a past medical history of Acne rosacea, Actinic dermatitis, Anxiety, Dermatitis, Hypertension, and Osteopenia. Past Surgical History:   has a past surgical history that includes Cataract removal.    Vital Signs  Patient Currently in Pain: Yes   Pain Screening  Patient Currently in Pain: Yes  Pain Assessment  Pain Assessment: 0-10  Pain Level: 7(Pain ranges from 0-9/10 )  Pain Type: Chronic pain  Pain Location: Rib cage;Back  Pain Orientation: Left;Mid  Pain Radiating Towards: wrapping around L ribcage to front below chest  Pain Descriptors: Ora Shove; Stabbing  Pain Frequency: Intermittent  Pain Onset: Sudden  Clinical Progression: Gradually improving  Functional Pain Assessment: Prevents or interferes with all active and some passive activities  Non-Pharmaceutical Pain Intervention(s): Heat applied     Lives With: Alone  Type of Home: House  ADL Assistance: Independent  Homemaking Assistance: Independent  Homemaking Responsibilities: Yes  Ambulation Assistance: Independent  Transfer Assistance: Independent  Active : Yes  Occupation: Retired  Leisure & Hobbies: social activity   IADL Comments: current functional level 50%   Additional Comments: R hand dominant      Subjective:  Subjective: Pt reports in the end of May got out of bed and was with increased L sided mid back pain that wraps around to the front side.  Intermittent tingling and spasms that have decreased d/t mm relaxer and Naproxen. Has xrays taken 6/26/20 which showed degenerative changes and narrowing T7-T8 and T8-T9 with mild anterior spurring. Pain increases with sneezing, coughing, and deep breathing. Comments: RTD 7/24/20    Objective:   Strength RUE  Comment: 4+/5 grossly except MT, rhombiods, Lats 4/5 LT 4-/5 (pain with flex and ER)  Strength LUE  Comment: 4+/5 except shoulder flex 4/5     AROM RUE (degrees)  RUE AROM : WFL  RUE General AROM: increased pain with shoulder flexion  AROM LUE (degrees)  LUE AROM : WFL  LUE General AROM: increased pain with shoulder flexion    Spine  Thoracic: thoracolumbar rotation L WNL R 50%   Lumbar: flex, ext, L SB 75%, R SB 50% (pain with all mvmt )    Observation/Palpation  Observation: thoracic scoliosis with L shoulder and scap elevation, flattened lumbar lordosis, increased thoracic kyphosis, mild FHP   Bed mobility  Rolling to Left: Modified independent  Rolling to Right: Modified independent  Supine to Sit: Modified independent  Sit to Supine: Modified independent  Comment: increased time required to perform d/t pain    Exercises:   Exercises  Exercise 1: Seated R SB stretch 20\"x3  Exercise 2: Barrel stretch 20\"x2   Exercise 3: attempted midback stretch on table with increased pain  Exercise 4: supine horizontal abd*  Exercise 5: supine chest pulls*  Exercise 6: scap retract*  Exercise 7: corner stretch*   Exercise 20: HEP: seated R SB, barrel stretch  Modalities:  Modalities  Moist heat: x10 min to midback to decrease mm tightness and spasms  Ultrasound: 3.0 Mhz, 1.0W/cm2, cont, 6 min*  E-stim (parameters): PRN for pain control*  Other: Consider KT inhibition*   Manual:  Manual therapy  Joint mobilization: Reverse thoracic SNAGS T7-T12 x4 min   Soft Tissue Mobalization: L T7-T12 parapsinal inhibition technique x3 min   *Indicates exercise,modality, or manual techniques to be initiated when appropriate  Assessment:   Body structures, Functions, Activity limitations: Decreased functional mobility , Decreased ADL status, Decreased ROM, Decreased strength, Increased pain, Decreased posture  Assessment: Pt presents with onset of L sided thoracic pain with anterior radiation beginning in the end of May 2020. She currently presents with decreased postural awareness, decreased B periscpaular strength, decreased thoracic mobility, decreased thoracolumbar arom, and decreased functional activity tolerance. these impairments currently limit her abilities to lift, carry, sleep, perform ADL's, household duties, recreational activities, and stand prolonged periods without increased pain or limitations. Specific instructions for Next Treatment: Cont with Reverse SNAGS, consider rib lifts if needed   Prognosis: Good, Fair  Discharge Recommendations: Continue to assess pending progress  Activity Tolerance: Patient limited by pain  Activity Tolerance: pt with fair tolerance intermittenetly limited by pain with decreased pain after manual techniques     Decision Making: Medium Complexity  History: med  Exam: high; ADRIANNE 22/50  Clinical Presentation: high      Plan  Frequency/Duration:  Plan  Times per week: 2  Plan weeks: 5  Specific instructions for Next Treatment: Cont with Reverse SNAGS, consider rib lifts if needed   Current Treatment Recommendations: Strengthening, ROM, Manual Therapy - Joint Manipulation, Manual Therapy - Soft Tissue Mobilization, Patient/Caregiver Education & Training, Pain Management, Home Exercise Program, Modalities, Positioning, Integrated Dry Needling     Patient Education  New Education Provided: PT Education: Goals;PT Role;Plan of Care;Home Exercise Program  Patient Education: Discussed self STM with tennis ball, seated posture with towel roll     POST-PAIN     Pain Rating (0-10 pain scale):   4/10  Location and pain description same as pre-treatment unless indicated.    Action: [] NA  [] Call Physician  [x] Perform HEP  [x] Meds as prescribed    Evaluation and patient rights have been reviewed and patient agrees with plan of care. Yes  [x]  No  []   Explain:     Gautam Fall Risk Assessment  Risk Factor Scale  Score   History of Falls [] Yes  [x] No 25  0    Secondary Diagnosis [] Yes  [x] No 15  0    Ambulatory Aid [] Furniture  [] Crutches/cane/walker  [x] None/bedrest/wheelchair/nurse 30  15  0    IV/Heparin Lock [] Yes  [x] No 20  0    Gait/Transferring [] Impaired  [] Weak  [x] Normal/bedrest/immobile 20  10  0    Mental Status [] Forgets limitations  [x] Oriented to own ability 15  0       Total: 0     Based on the Assessment score: check the appropriate box.   [x]  No intervention needed   Low =   Score of 0-24  []  Use standard prevention interventions Moderate =  Score of 24-44   [] Discuss fall prevention strategies   [] Indicate moderate falls risk on eval  []  Use high risk prevention interventions High = Score of 45 and higher   [] Discuss fall prevention strategies   [] Provide supervision during treatment time    Goals  Short term goals  Time Frame for Short term goals: 2-3 weeks   Short term goal 1: The pt will demonstrate improved postural awareness requiring <25% VC's with exercises  Short term goal 2: The pt will report decreased L thoracic pain >/=75% to increase ease with ADL's   Long term goals  Time Frame for Long term goals : 5 weeks   Long term goal 1: The pt will have a decrease in ADRIANNE score >/=10 points in order to increase functional activity tolerance   Long term goal 2: The pt will be indep/compliant with HEP in order to self manage symptoms upon D/C  Long term goal 3: The pt will demonstrate improved B periscapular strength >/=4+/5 in order to lift/carry with decreased pain   Long term goal 4: The pt will demo improved thoracolumbar AROM WNL's in order to increase ease with bed mobility     Treatment Initiated : ther ex, manual, hep, modalities     PT Individual Minutes  Time In: 1008  Time Out: 1055  Minutes: 47  Timed Code Treatment Minutes: 14 Minutes  Procedure Minutes: 23-eval 10-MHP     Timed Activity Minutes Units   Ther Ex 7 0   Manual  7 1       Electronically signed by Penelope Pak PT on 7/8/20 at 10:56 AM EDT

## 2020-07-08 NOTE — TELEPHONE ENCOUNTER
Pt went to PT today they suggested she call for a refill of the muscle relaxer    Priscilla pt       LOV  6/26/20.

## 2020-07-10 ENCOUNTER — HOSPITAL ENCOUNTER (OUTPATIENT)
Dept: PHYSICAL THERAPY | Age: 70
Setting detail: THERAPIES SERIES
Discharge: HOME | End: 2020-07-10
Payer: MEDICARE

## 2020-07-10 PROCEDURE — G0283 ELEC STIM OTHER THAN WOUND: HCPCS

## 2020-07-10 PROCEDURE — 97140 MANUAL THERAPY 1/> REGIONS: CPT

## 2020-07-10 PROCEDURE — 97110 THERAPEUTIC EXERCISES: CPT

## 2020-07-10 ASSESSMENT — PAIN DESCRIPTION - DESCRIPTORS: DESCRIPTORS: SHARP

## 2020-07-10 ASSESSMENT — PAIN DESCRIPTION - ORIENTATION: ORIENTATION: MID

## 2020-07-10 ASSESSMENT — PAIN DESCRIPTION - FREQUENCY: FREQUENCY: INTERMITTENT

## 2020-07-10 ASSESSMENT — PAIN DESCRIPTION - LOCATION: LOCATION: BACK

## 2020-07-10 ASSESSMENT — PAIN SCALES - GENERAL: PAINLEVEL_OUTOF10: 3

## 2020-07-10 ASSESSMENT — PAIN DESCRIPTION - PAIN TYPE: TYPE: CHRONIC PAIN

## 2020-07-10 NOTE — PROGRESS NOTES
[id number] [address]  Outpatient Physical Therapy    Treatment Note        Date: 7/10/2020  Patient: Sridhar Durham  : [id number]  ACCT #: [de-identified]  Referring Practitioner: Dilan Mejia CNP  Diagnosis: Acute L sided thoracic back pain     Visit Information:  PT Visit Information  Onset Date: 20  PT Insurance Information: Medicare   Total # of Visits to Date: 2  Plan of Care/Certification Expiration Date: 10/08/20  No Show: 0  Progress Note Due Date: 20  Canceled Appointment: 0  Progress Note Counter: 2/10 (PN due 20)    Subjective: Pt states \"Today is better. Yesterday was not great and I had a hard time sleeping last night but I woke up feeling ok. \"   Comments: RTD 20  HEP Compliance:  [x] Good [] Fair [] Poor [] Reports not doing due to:    Vital Signs  Patient Currently in Pain: Yes   Pain Screening  Patient Currently in Pain: Yes  Pain Assessment  Pain Assessment: 0-10  Pain Level: 3  Pain Type: Chronic pain  Pain Location: Back  Pain Orientation: Mid(centered in spine)  Pain Descriptors: Sharp  Pain Frequency: Intermittent    OBJECTIVE:   Exercises  Exercise 1: Seated  SB stretch over PBall 10\"x3 B   Exercise 2: Barrel stretch 10\"x3   Exercise 4: supine horizontal abd x6 (arms at 45 deg ABD- pt c/o inc B shldr pain D/T history of bursitis and tedonitis)   Exercise 5: supine chest pulls (arms at 45 deg ABD to avoid shldr pain) x10 (IR only this date)   Exercise 6: scap retract 5\"x10   Exercise 7: corner stretch (gentle) \"W\" form 10\"x3   Exercise 8: Gentle trunk rot in supine (palms together rot Rt to Lt) 5\"x10  Exercise 9: LTR 10\"x10   Exercise 20: HEP: LTR, seated trunk rot, supine chest pulls (pt provided w/ YTB)      Strength: [x] NT  [] MMT completed:     ROM: [x] NT  [] ROM measurements:      Manual:   Manual therapy  Joint mobilization: Reverse thoracic SNAGS T7-T12 (w/ use of Mulligan pad, pt in prone)  Soft Tissue Mobalization: L T7-T12 parapsinal inhibition technique  Other: 8 min total     Modalities:  Modalities  Moist heat: x10 min to midback to decrease mm tightness and spasms  Ultrasound: 3.0 Mhz, 1.0W/cm2, cont, 6 min*  E-stim (parameters): IFC to thoracolumbar paraspinals 10 min in seated w/ HP   Other: Consider KT inhibition*      *Indicates exercise, modality, or manual techniques to be initiated when appropriate    Assessment: Body structures, Functions, Activity limitations: Decreased functional mobility , Decreased ADL status, Decreased ROM, Decreased strength, Increased pain, Decreased posture  Assessment: Initiated PT program per POC w/ focus on improving pts thoracolumbar mobility and periscapular strength. Pt w/ fair shaila to tx as pt req cues and modifications of stretches and exs to avoid pain. Pt often anticipating B shldr pain D/T reports of past bursitis/tendonitis though able to perform within limited range. Pt w/ significant relief of pain following manual techniques w/ 0/10 pain level reported at rest post tx. Concluded tx w/ modalities to further address mm tightness.    Treatment Diagnosis: L sided thoracic pain, decreased postural awareness, decreased B periscpaular strength, decreased thoracic mobility, decreased thoracolumbar arom, and decreased functional activity tolerance  Prognosis: Fair, Good     Goals:  Short term goals  Time Frame for Short term goals: 2-3 weeks   Short term goal 1: The pt will demonstrate improved postural awareness requiring <25% VC's with exercises  Short term goal 2: The pt will report decreased L thoracic pain >/=75% to increase ease with ADL's   Long term goals  Time Frame for Long term goals : 5 weeks   Long term goal 1: The pt will have a decrease in ADRIANNE score >/=10 points in order to increase functional activity tolerance   Long term goal 2: The pt will be indep/compliant with HEP in order to self manage symptoms upon D/C  Long term goal 3: The pt will demonstrate improved B periscapular strength >/=4+/5 in order to lift/carry with decreased pain   Long term goal 4: The pt will demo improved thoracolumbar AROM WNL's in order to increase ease with bed mobility   Progress toward goals: thoracolumbar mobility and scapular strength     POST-PAIN       Pain Rating (0-10 pain scale):   0/10   Location and pain description same as pre-treatment unless indicated. Action: [x] NA   [] Perform HEP  [] Meds as prescribed  [] Modalities as prescribed   [] Call Physician     Frequency/Duration:  Plan  Times per week: 2  Plan weeks: 5  Specific instructions for Next Treatment: Cont with Reverse SNAGS, consider rib lifts if needed   Current Treatment Recommendations: Strengthening, ROM, Manual Therapy - Joint Manipulation, Manual Therapy - Soft Tissue Mobilization, Patient/Caregiver Education & Training, Pain Management, Home Exercise Program, Modalities, Positioning, Integrated Dry Needling     Pt to continue current HEP. See objective section for any therapeutic exercise changes, additions or modifications this date.     PT Individual Minutes  Time In: 9174  Time Out: 1012  Minutes: 51  Timed Code Treatment Minutes: 41 Minutes  Procedure Minutes: 10 min (HP/ES)     Timed Activity Minutes Units   Ther Ex 33 2   Manual  8 1       Signature:  Electronically signed by Lj Kat PTA on 7/10/20 at 10:30 AM EDT

## 2020-07-14 ENCOUNTER — HOSPITAL ENCOUNTER (OUTPATIENT)
Dept: PHYSICAL THERAPY | Age: 70
Setting detail: THERAPIES SERIES
Discharge: HOME | End: 2020-07-14
Payer: MEDICARE

## 2020-07-14 PROCEDURE — G0283 ELEC STIM OTHER THAN WOUND: HCPCS

## 2020-07-14 PROCEDURE — 97110 THERAPEUTIC EXERCISES: CPT

## 2020-07-14 PROCEDURE — 97140 MANUAL THERAPY 1/> REGIONS: CPT

## 2020-07-14 ASSESSMENT — PAIN DESCRIPTION - DESCRIPTORS: DESCRIPTORS: ACHING;SHARP

## 2020-07-14 ASSESSMENT — PAIN DESCRIPTION - ORIENTATION: ORIENTATION: MID

## 2020-07-14 ASSESSMENT — PAIN DESCRIPTION - LOCATION: LOCATION: BACK

## 2020-07-14 ASSESSMENT — PAIN DESCRIPTION - PAIN TYPE: TYPE: CHRONIC PAIN

## 2020-07-14 ASSESSMENT — PAIN SCALES - GENERAL: PAINLEVEL_OUTOF10: 3

## 2020-07-14 NOTE — PROGRESS NOTES
[id number] [address]  Outpatient Physical Therapy    Treatment Note        Date: 2020  Patient: Junior Roberson  : 1950  ACCT #: [de-identified]  Referring Practitioner: Radha Gamino CNP  Diagnosis: Acute L sided thoracic back pain     Visit Information:  PT Visit Information  Onset Date: 20  PT Insurance Information: Medicare   Total # of Visits to Date: 3  Plan of Care/Certification Expiration Date: 10/08/20  No Show: 0  Progress Note Due Date: 20  Canceled Appointment: 0  Progress Note Counter: 3/10 (PN due 20)    Subjective: Pt states \"the pain is better. \" Pt states the pain is primarily centralized in the spine, and occ radiates through the ribs, but is happening less. Comments: RTD 20  HEP Compliance:  [x] Good [] Fair [] Poor [] Reports not doing due to:    Vital Signs  Patient Currently in Pain: Yes   Pain Screening  Patient Currently in Pain: Yes  Pain Assessment  Pain Assessment: 0-10  Pain Level: 3  Pain Type: Chronic pain  Pain Location: Back  Pain Orientation: Mid  Pain Descriptors: Aching; Sharp    OBJECTIVE:   Exercises  Exercise 1: Seated  SB stretch over PBall 10\"x3 B   Exercise 2: Barrel stretch 10\"x3   Exercise 6: scap retract 5\"x10   Exercise 7: corner stretch (gentle) \"W\" form 10\"x3   Exercise 8: Gentle trunk rot in supine (palms together rot Rt to Lt) 5\"x10  Exercise 9: LTR 10\"x10   Exercise 10: rows/lats YTB x10 ea    Strength: [x] NT  [] MMT completed:    ROM: [x] NT  [] ROM measurements:     Manual:   Manual therapy  Soft Tissue Mobalization: L T7-T12 parapsinal inhibition technique  Other: 10 min total    Modalities:  Modalities  Moist heat: x10 min to midback to decrease mm tightness and spasms  E-stim (parameters): IFC to thoracolumbar paraspinals 10 min in seated w/ HP   Other: KT inhibition to L thoracic paraspinals. Consent signed and placed in paper chart.      *Indicates exercise, modality, or manual techniques to be initiated when appropriate    Assessment: Body structures, Functions, Activity limitations: Decreased functional mobility , Decreased ADL status, Decreased ROM, Decreased strength, Increased pain, Decreased posture  Assessment: cont to progress therex within pt tolerance w/ pt reporting min inc difficulty and pain w/ therex compared to prev visit, except pt reporting relief w/ upper and lower trunk rotation. Pt reporting dec pain post manual and modalities. initiated KT tape for inhibition of L paraspinals. Treatment Diagnosis: L sided thoracic pain, decreased postural awareness, decreased B periscpaular strength, decreased thoracic mobility, decreased thoracolumbar arom, and decreased functional activity tolerance  Prognosis: Fair, Good     Goals:  Short term goals  Time Frame for Short term goals: 2-3 weeks   Short term goal 1: The pt will demonstrate improved postural awareness requiring <25% VC's with exercises  Short term goal 2: The pt will report decreased L thoracic pain >/=75% to increase ease with ADL's   Long term goals  Time Frame for Long term goals : 5 weeks   Long term goal 1: The pt will have a decrease in ADRIANNE score >/=10 points in order to increase functional activity tolerance   Long term goal 2: The pt will be indep/compliant with HEP in order to self manage symptoms upon D/C  Long term goal 3: The pt will demonstrate improved B periscapular strength >/=4+/5 in order to lift/carry with decreased pain   Long term goal 4: The pt will demo improved thoracolumbar AROM WNL's in order to increase ease with bed mobility   Progress toward goals: inc strength, rom, dec pain    POST-PAIN       Pain Rating (0-10 pain scale):  0 /10   Location and pain description same as pre-treatment unless indicated.    Action: [] NA   [x] Perform HEP  [] Meds as prescribed  [] Modalities as prescribed   [] Call Physician     Frequency/Duration:  Plan  Times per week: 2  Plan weeks: 5  Specific instructions for Next Treatment: Cont with Reverse SNAGS, consider rib lifts if needed   Current Treatment Recommendations: Strengthening, ROM, Manual Therapy - Joint Manipulation, Manual Therapy - Soft Tissue Mobilization, Patient/Caregiver Education & Training, Pain Management, Home Exercise Program, Modalities, Positioning, Integrated Dry Needling     Pt to continue current HEP. See objective section for any therapeutic exercise changes, additions or modifications this date.     PT Individual Minutes  Time In: 8275  Time Out: 1052  Minutes: 50  Timed Code Treatment Minutes: 40 Minutes  Procedure Minutes: 10     Timed Activity Minutes Units   Ther Ex 30 2   Manual  10 1       Signature:  Electronically signed by Lori Clemente PTA on 7/14/20 at 10:05 AM EDT

## 2020-07-16 ENCOUNTER — HOSPITAL ENCOUNTER (OUTPATIENT)
Dept: PHYSICAL THERAPY | Age: 70
Setting detail: THERAPIES SERIES
Discharge: HOME | End: 2020-07-16
Payer: MEDICARE

## 2020-07-16 PROCEDURE — 97140 MANUAL THERAPY 1/> REGIONS: CPT

## 2020-07-16 PROCEDURE — G0283 ELEC STIM OTHER THAN WOUND: HCPCS

## 2020-07-16 PROCEDURE — 97110 THERAPEUTIC EXERCISES: CPT

## 2020-07-16 ASSESSMENT — PAIN DESCRIPTION - LOCATION: LOCATION: BACK

## 2020-07-16 ASSESSMENT — PAIN DESCRIPTION - DESCRIPTORS: DESCRIPTORS: ACHING

## 2020-07-16 ASSESSMENT — PAIN DESCRIPTION - ORIENTATION: ORIENTATION: MID

## 2020-07-16 ASSESSMENT — PAIN SCALES - GENERAL: PAINLEVEL_OUTOF10: 3

## 2020-07-16 NOTE — PROGRESS NOTES
[id number] [address]  Outpatient Physical Therapy    Treatment Note        Date: 2020  Patient: Donette Merlin  : 1950  ACCT #: [de-identified]  Referring Practitioner: Eleni Bui CNP  Diagnosis: Acute L sided thoracic back pain     Visit Information:  PT Visit Information  Onset Date: 20  PT Insurance Information: Medicare   Total # of Visits to Date: 4  Plan of Care/Certification Expiration Date: 10/08/20  No Show: 0  Canceled Appointment: 0  Progress Note Counter: 4/10 (PN due 20)    Subjective: Pt reports did not feel KT tape helped at all. States prefers the use of heat. States relief with estim as well. Pt reports feels therapy is helping overall. States pain central to Lt side thoracolumbar paraspinals without radiating anteriorly along ribs anymore.   Comments: RTD 20  HEP Compliance:  [x] Good [] Fair [] Poor [] Reports not doing due to:    Vital Signs  Patient Currently in Pain: Yes   Pain Screening  Patient Currently in Pain: Yes  Pain Assessment  Pain Assessment: 0-10  Pain Level: 3  Pain Location: Back  Pain Orientation: Mid  Pain Descriptors: Aching    OBJECTIVE:   Exercises  Exercise 1: Seated  SB stretch over PBall 20 sec x 3 B  Exercise 2: Barrel stretch 10 sec x 5  Exercise 3: Pball roll up wall for thoracic ext 10 sec x 5  Exercise 4: Attempted seated trunk rotation with tennis ball pass with spasm L side thoracolumbar paraspinals  Exercise 6: scap retract 5 sec x 15  Exercise 8: Gentle trunk rot in supine (palms together rot Rt to Lt) 5\"x10  Exercise 9: LTR 10\"x10   Exercise 10: rows/lats YTB x10 ea     Manual:   Manual therapy  Joint mobilization: Reverse thoracic SNAGS T7-T12 (w/ use of Mulligan pad)  Soft Tissue Mobalization: STM/TPR L T7-T12 parapsinal inhibition technique  Other: 12 min total    Modalities:  Modalities  Moist heat: x10 min to midback to decrease mm tightness and spasms  E-stim (parameters): IFC to thoracolumbar paraspinals 10 min in seated w/ HP      *Indicates exercise, modality, or manual techniques to be initiated when appropriate    Assessment: Body structures, Functions, Activity limitations: Decreased functional mobility , Decreased ADL status, Decreased ROM, Decreased strength, Increased pain, Decreased posture  Assessment: Pt demonstrates palpably increased mm tension Lt side thoracolumbar paraspinals. Instructed in use of tennis ball for self TPR at home. Attempted thoracic rotation with tennic ball pass with pt reporting Lt side thoracolumbar paraspinal muscle spasm. Continues to report complete relief of pain with modalities. Treatment Diagnosis: L sided thoracic pain, decreased postural awareness, decreased B periscpaular strength, decreased thoracic mobility, decreased thoracolumbar arom, and decreased functional activity tolerance        Goals:  Short term goals  Time Frame for Short term goals: 2-3 weeks   Short term goal 1: The pt will demonstrate improved postural awareness requiring <25% VC's with exercises  Short term goal 2: The pt will report decreased L thoracic pain >/=75% to increase ease with ADL's     Long term goals  Time Frame for Long term goals : 5 weeks   Long term goal 1: The pt will have a decrease in ADRIANNE score >/=10 points in order to increase functional activity tolerance   Long term goal 2: The pt will be indep/compliant with HEP in order to self manage symptoms upon D/C  Long term goal 3: The pt will demonstrate improved B periscapular strength >/=4+/5 in order to lift/carry with decreased pain   Long term goal 4: The pt will demo improved thoracolumbar AROM WNL's in order to increase ease with bed mobility   Progress toward goals: Progressing towards all    POST-PAIN       Pain Rating (0-10 pain scale):   0/10   Location and pain description same as pre-treatment unless indicated.    Action: [] NA   [x] Perform HEP  [] Meds as prescribed  [] Modalities as prescribed   [] Call Physician Frequency/Duration:  Plan  Times per week: 2  Plan weeks: 5  Specific instructions for Next Treatment: Cont with Reverse SNAGS, consider rib lifts if needed   Current Treatment Recommendations: Strengthening, ROM, Manual Therapy - Joint Manipulation, Manual Therapy - Soft Tissue Mobilization, Patient/Caregiver Education & Training, Pain Management, Home Exercise Program, Modalities, Positioning, Integrated Dry Needling     Pt to continue current HEP. See objective section for any therapeutic exercise changes, additions or modifications this date.          PT Individual Minutes  Time In: 6889  Time Out: 5658  Minutes: 51  Timed Code Treatment Minutes: 41 Minutes  Procedure Minutes: MH/Estim x10 min     Timed Activity Minutes Units   Ther Ex 29 2   Manual  12 1       Signature:  Electronically signed by Didi Steiner PTA on 7/16/20 at 10:55 AM EDT

## 2020-07-20 ENCOUNTER — HOSPITAL ENCOUNTER (OUTPATIENT)
Dept: PHYSICAL THERAPY | Age: 70
Setting detail: THERAPIES SERIES
Discharge: HOME | End: 2020-07-20
Payer: MEDICARE

## 2020-07-20 PROCEDURE — 97140 MANUAL THERAPY 1/> REGIONS: CPT

## 2020-07-20 PROCEDURE — G0283 ELEC STIM OTHER THAN WOUND: HCPCS

## 2020-07-20 PROCEDURE — 97110 THERAPEUTIC EXERCISES: CPT

## 2020-07-20 ASSESSMENT — PAIN DESCRIPTION - DESCRIPTORS: DESCRIPTORS: ACHING

## 2020-07-20 ASSESSMENT — PAIN DESCRIPTION - ORIENTATION: ORIENTATION: MID

## 2020-07-20 ASSESSMENT — PAIN DESCRIPTION - LOCATION: LOCATION: BACK

## 2020-07-20 ASSESSMENT — PAIN SCALES - GENERAL: PAINLEVEL_OUTOF10: 2

## 2020-07-20 ASSESSMENT — PAIN DESCRIPTION - PAIN TYPE: TYPE: CHRONIC PAIN

## 2020-07-20 NOTE — PROGRESS NOTES
[address] [address]  Outpatient Physical Therapy    Treatment Note        Date: 2020  Patient: Michaela Mittal  : 1950  ACCT #: [de-identified]  Referring Practitioner: Grady Collier CNP  Diagnosis: Acute L sided thoracic back pain     Visit Information:  PT Visit Information  Onset Date: 20  PT Insurance Information: Medicare   Total # of Visits to Date: 5  Plan of Care/Certification Expiration Date: 10/08/20  No Show: 0  Canceled Appointment: 0  Progress Note Counter: 5/10 (PN due 20)    Subjective: Pt states she's been using the tennis ball a lot more and it seems to be helping, especially w/ break through pain between meds. Pt cont to report less radiating pain, unless she sneezes or coughs. Pt ambulating w/ noticably dec guarding today.   Comments: RTD 20  HEP Compliance:  [x] Good [] Fair [] Poor [] Reports not doing due to:    Vital Signs  Patient Currently in Pain: Yes   Pain Screening  Patient Currently in Pain: Yes  Pain Assessment  Pain Assessment: 0-10  Pain Level: 2  Pain Type: Chronic pain  Pain Location: Back  Pain Orientation: Mid  Pain Descriptors: Aching    OBJECTIVE:   Exercises  Exercise 1: Seated  SB stretch over PBall 20 sec x 3 B  Exercise 3: Pball roll up wall for thoracic ext 10 sec x 8  Exercise 4: seated tennis ball pass at midline w/ horizontal abd x10 - no muscle spasm  Exercise 9: LTR 10\"x10   Exercise 10: rows/lats YTB x12 ea  Exercise 11: pulleys x4 min, progress to UBE as shaila*  Exercise 12: concurrent ER w/ YTB x10  Exercise 20: HEP: rows, lats    Strength: [x] NT  [] MMT completed:    ROM: [x] NT  [] ROM measurements:     Manual:   Manual therapy  Joint mobilization: Reverse thoracic SNAGS T7-T12 (w/ use of Mulligan pad)  Soft Tissue Mobalization: STM/TPR L T7-T12 parapsinal inhibition technique, tennis ball TPR to thoracoparaspinals  Other: 15 min total    Modalities:  Modalities  Moist heat: x10 min to midback to decrease mm tightness and spasms  E-stim (parameters): IFC to thoracolumbar paraspinals 10 min in seated w/ HP      *Indicates exercise, modality, or manual techniques to be initiated when appropriate    Assessment: Body structures, Functions, Activity limitations: Decreased functional mobility , Decreased ADL status, Decreased ROM, Decreased strength, Increased pain, Decreased posture  Assessment: Pt w/ x1 instance of spasm w/ sit to supine transfer requiring a few minute rest break for pain to calm down, followed by pt using tennis ball to further calm pain. Pt then able to cont w/ remainder of session. Significant tightness still palpable along L thoracolumbar paraspinals that dec w/ manual and modalities. Treatment Diagnosis: L sided thoracic pain, decreased postural awareness, decreased B periscpaular strength, decreased thoracic mobility, decreased thoracolumbar arom, and decreased functional activity tolerance  Prognosis: Fair, Good     Goals:  Short term goals  Time Frame for Short term goals: 2-3 weeks   Short term goal 1: The pt will demonstrate improved postural awareness requiring <25% VC's with exercises  Short term goal 2: The pt will report decreased L thoracic pain >/=75% to increase ease with ADL's   Long term goals  Time Frame for Long term goals : 5 weeks   Long term goal 1: The pt will have a decrease in ADRIANNE score >/=10 points in order to increase functional activity tolerance   Long term goal 2: The pt will be indep/compliant with HEP in order to self manage symptoms upon D/C  Long term goal 3: The pt will demonstrate improved B periscapular strength >/=4+/5 in order to lift/carry with decreased pain   Long term goal 4: The pt will demo improved thoracolumbar AROM WNL's in order to increase ease with bed mobility   Progress toward goals: improved strength, rom, dec pain    POST-PAIN       Pain Rating (0-10 pain scale):   0/10   Location and pain description same as pre-treatment unless indicated.    Action: [] NA   [x] Perform HEP [] Meds as prescribed  [] Modalities as prescribed   [] Call Physician     Frequency/Duration:  Plan  Times per week: 2  Plan weeks: 5  Specific instructions for Next Treatment: Cont with Reverse SNAGS, consider rib lifts if needed   Current Treatment Recommendations: Strengthening, ROM, Manual Therapy - Joint Manipulation, Manual Therapy - Soft Tissue Mobilization, Patient/Caregiver Education & Training, Pain Management, Home Exercise Program, Modalities, Positioning, Integrated Dry Needling     Pt to continue current HEP. See objective section for any therapeutic exercise changes, additions or modifications this date.     PT Individual Minutes  Time In: 5281  Time Out: 1140  Minutes: 56  Timed Code Treatment Minutes: 46 Minutes  Procedure Minutes: 10     Timed Activity Minutes Units   Ther Ex 31 2   Manual  15 1       Signature:  Electronically signed by Arsenio Kirk PTA on 7/20/20 at 10:50 AM EDT

## 2020-07-23 ENCOUNTER — HOSPITAL ENCOUNTER (OUTPATIENT)
Dept: PHYSICAL THERAPY | Age: 70
Setting detail: THERAPIES SERIES
Discharge: HOME | End: 2020-07-23
Payer: MEDICARE

## 2020-07-23 PROCEDURE — 97140 MANUAL THERAPY 1/> REGIONS: CPT

## 2020-07-23 PROCEDURE — 97110 THERAPEUTIC EXERCISES: CPT

## 2020-07-23 PROCEDURE — G0283 ELEC STIM OTHER THAN WOUND: HCPCS

## 2020-07-23 NOTE — PROGRESS NOTES
[id number] [address]  Outpatient Physical Therapy    Treatment Note        Date: 2020  Patient: Peggy Sargent  : 1950  ACCT #: [de-identified]  Referring Practitioner: Epi Burnham CNP  Diagnosis: Acute L sided thoracic back pain     Visit Information:  PT Visit Information  Onset Date: 20  PT Insurance Information: Medicare   Total # of Visits to Date: 5  Plan of Care/Certification Expiration Date: 10/08/20  No Show: 0  Canceled Appointment: 0  Progress Note Counter: 5/10 (PN due 20)    Subjective: Pt presenting to appt reporting 2/10 pain level currently however states \"It goes up to a 9 if I cough. \" Pt c/o inc pain levels on Tuesday into Wednesday following Monday PT session stating \"I think I over did it with the exercises but I felt good when I left. \" Pt also c/o reoccuring tingling sx's into B LE's  Comments: RTD 20  HEP Compliance:  [x] Good [] Fair [] Poor [] Reports not doing due to:    Vital Signs  Patient Currently in Pain: Yes   Pain Screening  Patient Currently in Pain: Yes  Pain Assessment  Pain Assessment: 0-10    OBJECTIVE:   Exercises  Exercise 1: Seated  SB stretch over PBall 20 sec x 3 B  Exercise 9: LTR 10\"x10   Exercise 11: pulleys x4 min, progress to UBE as shaila*  Exercise 20: HEP: deep breathing exs for muscle relaxationw/tension, TA iso (verbally issued this date)    Strength: [x] NT  [] MMT completed:      ROM: [x] NT  [] ROM measurements:      Manual:   Manual therapy  Joint mobilization: Reverse thoracic SNAGS T7-T12 (w/ use of Mulligan pad)  Soft Tissue Mobalization: STM/TPR L T7-T12 parapsinal inhibition technique, tennis ball TPR to thoracoparaspinals  Other: 23 min total    Modalities:  Modalities  Moist heat: x10 min to midback to decrease mm tightness and spasms  E-stim (parameters): IFC to thoracolumbar paraspinals 10 min in seated w/ HP      *Indicates exercise, modality, or manual techniques to be initiated when appropriate    Assessment:    Body structures, Functions, Activity limitations: Decreased functional mobility , Decreased ADL status, Decreased ROM, Decreased strength, Increased pain, Decreased posture  Assessment: Fair/poor shaila to ther ex this date as pt experiencing intense muscle spams in Lt thoracic region during supine TA iso's and changing positions on therapy mat. Pt w/ slow relief of pain upon placing bolster under knees and cuing to perform deep/relaxation breaths. Majority of tx focused on manual techniques to alleviate pain and spams w/ 0/10 pain reported post tx. Trialed TA isos in prone w/ improved tolerance. Discussed various ways to protect spine when sneezing and counghing to ease pain. Treatment Diagnosis: L sided thoracic pain, decreased postural awareness, decreased B periscpaular strength, decreased thoracic mobility, decreased thoracolumbar arom, and decreased functional activity tolerance  Prognosis: Fair, Good     Goals:  Short term goals  Time Frame for Short term goals: 2-3 weeks   Short term goal 1: The pt will demonstrate improved postural awareness requiring <25% VC's with exercises  Short term goal 2: The pt will report decreased L thoracic pain >/=75% to increase ease with ADL's   Long term goals  Time Frame for Long term goals : 5 weeks   Long term goal 1: The pt will have a decrease in ADRIANNE score >/=10 points in order to increase functional activity tolerance   Long term goal 2: The pt will be indep/compliant with HEP in order to self manage symptoms upon D/C  Long term goal 3: The pt will demonstrate improved B periscapular strength >/=4+/5 in order to lift/carry with decreased pain   Long term goal 4: The pt will demo improved thoracolumbar AROM WNL's in order to increase ease with bed mobility   Progress toward goals: as listed above     POST-PAIN       Pain Rating (0-10 pain scale):   0/10   Location and pain description same as pre-treatment unless indicated.    Action: [x] NA   [] Perform HEP  [] Meds as prescribed  [] Modalities as prescribed   [] Call Physician     Frequency/Duration:  Plan  Times per week: 2  Plan weeks: 5  Specific instructions for Next Treatment: Cont with Reverse SNAGS, consider rib lifts if needed   Current Treatment Recommendations: Strengthening, ROM, Manual Therapy - Joint Manipulation, Manual Therapy - Soft Tissue Mobilization, Patient/Caregiver Education & Training, Pain Management, Home Exercise Program, Modalities, Positioning, Integrated Dry Needling     Pt to continue current HEP. See objective section for any therapeutic exercise changes, additions or modifications this date.     PT Individual Minutes  Time In: 1004  Time Out: 1715  Minutes: 48  Timed Code Treatment Minutes: 38 Minutes  Procedure Minutes: 10 min (ES/HP)      Timed Activity Minutes Units   Ther Ex 15 1   Manual  23 2       Signature:  Electronically signed by Jesse Rhoades PTA on 7/23/20 at 12:55 PM EDT

## 2020-07-24 ENCOUNTER — OFFICE VISIT (OUTPATIENT)
Dept: FAMILY MEDICINE CLINIC | Age: 70
End: 2020-07-24
Payer: MEDICARE

## 2020-07-24 VITALS
HEART RATE: 49 BPM | OXYGEN SATURATION: 98 % | DIASTOLIC BLOOD PRESSURE: 82 MMHG | BODY MASS INDEX: 28 KG/M2 | SYSTOLIC BLOOD PRESSURE: 144 MMHG | HEIGHT: 63 IN | TEMPERATURE: 97.3 F | WEIGHT: 158 LBS

## 2020-07-24 PROCEDURE — G8417 CALC BMI ABV UP PARAM F/U: HCPCS | Performed by: NURSE PRACTITIONER

## 2020-07-24 PROCEDURE — 4040F PNEUMOC VAC/ADMIN/RCVD: CPT | Performed by: NURSE PRACTITIONER

## 2020-07-24 PROCEDURE — 1123F ACP DISCUSS/DSCN MKR DOCD: CPT | Performed by: NURSE PRACTITIONER

## 2020-07-24 PROCEDURE — G8427 DOCREV CUR MEDS BY ELIG CLIN: HCPCS | Performed by: NURSE PRACTITIONER

## 2020-07-24 PROCEDURE — 1036F TOBACCO NON-USER: CPT | Performed by: NURSE PRACTITIONER

## 2020-07-24 PROCEDURE — G8399 PT W/DXA RESULTS DOCUMENT: HCPCS | Performed by: NURSE PRACTITIONER

## 2020-07-24 PROCEDURE — 99213 OFFICE O/P EST LOW 20 MIN: CPT | Performed by: NURSE PRACTITIONER

## 2020-07-24 PROCEDURE — 3017F COLORECTAL CA SCREEN DOC REV: CPT | Performed by: NURSE PRACTITIONER

## 2020-07-24 PROCEDURE — 1090F PRES/ABSN URINE INCON ASSESS: CPT | Performed by: NURSE PRACTITIONER

## 2020-07-24 RX ORDER — NAPROXEN 500 MG/1
500 TABLET ORAL 2 TIMES DAILY WITH MEALS
Qty: 60 TABLET | Refills: 1 | Status: ON HOLD | OUTPATIENT
Start: 2020-07-24 | End: 2020-08-16 | Stop reason: HOSPADM

## 2020-07-24 RX ORDER — TIZANIDINE 2 MG/1
2 TABLET ORAL 3 TIMES DAILY PRN
Qty: 30 TABLET | Refills: 0 | Status: SHIPPED | OUTPATIENT
Start: 2020-07-24 | End: 2020-08-17 | Stop reason: SDUPTHER

## 2020-07-24 ASSESSMENT — ENCOUNTER SYMPTOMS
BACK PAIN: 0
ABDOMINAL DISTENTION: 0
SHORTNESS OF BREATH: 0
COUGH: 0
PHOTOPHOBIA: 0
COLOR CHANGE: 0
CHEST TIGHTNESS: 0
ABDOMINAL PAIN: 0

## 2020-07-24 NOTE — PROGRESS NOTES
Subjective  Chief Complaint   Patient presents with    1 Month Follow-Up     4 week f/u back pain, states that it has gotten better and that therapy has really helped.  Health Maintenance     states medicare wont pay for yearly mammo       HPI    F/u on back pain. Has been doing PT and everything is significantly improving. Pain seems to localized more around the spine now as opposed to paraspinal and wrapping around sides. She did have a bad day Monday into Tuesday but believes it is related to overdoing it. Overall has really improved. She continues to use naproxen and zanaflex.      Past Medical History:   Diagnosis Date    Acne rosacea     Actinic dermatitis     biopsy proven 1/2018    Anxiety     Dermatitis     Hypertension     Osteopenia      Patient Active Problem List    Diagnosis Date Noted    Actinic dermatitis     Acne rosacea     Dermatitis     Hypertension     Osteopenia     Anxiety      Past Surgical History:   Procedure Laterality Date    CATARACT REMOVAL      bilateral     Family History   Problem Relation Age of Onset    Diabetes Mother     Hypertension Mother     Hypertension Father     Breast Cancer Daughter      Social History     Socioeconomic History    Marital status:      Spouse name: None    Number of children: None    Years of education: None    Highest education level: None   Occupational History    None   Social Needs    Financial resource strain: Not hard at all   [place] insecurity     Worry: Never true     Inability: Never true    Transportation needs     Medical: No     Non-medical: No   Tobacco Use    Smoking status: Never Smoker    Smokeless tobacco: Never Used   Substance and Sexual Activity    Alcohol use: No     Alcohol/week: 0.0 standard drinks    Drug use: No    Sexual activity: None   Lifestyle    Physical activity     Days per week: None     Minutes per session: None    Stress: None   Relationships    Social connections     Talks on phone: None     Gets together: None     Attends Rastafari service: None     Active member of club or organization: None     Attends meetings of clubs or organizations: None     Relationship status: None    Intimate partner violence     Fear of current or ex partner: None     Emotionally abused: None     Physically abused: None     Forced sexual activity: None   Other Topics Concern    None   Social History Narrative    None     Current Outpatient Medications on File Prior to Visit   Medication Sig Dispense Refill    atenolol (TENORMIN) 50 MG tablet Take 1 tablet by mouth daily 90 tablet 1    alendronate (FOSAMAX) 70 MG tablet Take 1 tablet by mouth every 7 days 4 tablet 12    metroNIDAZOLE (METROGEL) 0.75 % gel Apply topically 2 times daily. 1 Tube 1    acetaminophen (TYLENOL) 650 MG CR tablet Take 650 mg by mouth every 8 hours as needed for Pain       No current facility-administered medications on file prior to visit. Allergies   Allergen Reactions    Codeine Rash       Review of Systems   Constitutional: Negative for chills, diaphoresis, fatigue and fever. HENT: Negative for congestion, dental problem and ear pain. Eyes: Negative for photophobia and visual disturbance. Respiratory: Negative for cough, chest tightness and shortness of breath. Cardiovascular: Negative for chest pain, palpitations and leg swelling. Gastrointestinal: Negative for abdominal distention and abdominal pain. Endocrine: Negative for polydipsia, polyphagia and polyuria. Genitourinary: Negative for difficulty urinating and dysuria. Musculoskeletal: Negative for arthralgias and back pain. Skin: Negative for color change and rash. Neurological: Negative for dizziness and headaches. Psychiatric/Behavioral: Negative for dysphoric mood. The patient is not nervous/anxious.         Objective  Vitals:    07/24/20 0844 07/24/20 0850   BP: (!) 144/84 (!) 144/82   Site: Left Upper Arm    Position: Sitting    Cuff Size: Medium Adult    Pulse: (!) 49    Temp: 97.3 °F (36.3 °C)    TempSrc: Infrared    SpO2: 98%    Weight: 158 lb (71.7 kg)    Height: 5' 3\" (1.6 m)      Physical Exam  Constitutional:       General: She is not in acute distress. Appearance: Normal appearance. She is normal weight. She is not ill-appearing, toxic-appearing or diaphoretic. HENT:      Head: Normocephalic and atraumatic. Right Ear: External ear normal.      Left Ear: External ear normal.   Neck:      Musculoskeletal: Normal range of motion and neck supple. No muscular tenderness. Cardiovascular:      Rate and Rhythm: Normal rate and regular rhythm. Pulses: Normal pulses. Heart sounds: Normal heart sounds. No murmur. Pulmonary:      Effort: Pulmonary effort is normal. No respiratory distress. Breath sounds: Normal breath sounds. No stridor. No wheezing, rhonchi or rales. Chest:      Chest wall: No tenderness. Musculoskeletal: Normal range of motion. Right lower leg: No edema. Left lower leg: No edema. Lymphadenopathy:      Cervical: No cervical adenopathy. Skin:     General: Skin is warm and dry. Capillary Refill: Capillary refill takes less than 2 seconds. Coloration: Skin is not jaundiced or pale. Findings: No bruising, erythema, lesion or rash. Neurological:      General: No focal deficit present. Mental Status: She is alert and oriented to person, place, and time. Mental status is at baseline. Cranial Nerves: No cranial nerve deficit. Coordination: Coordination normal.      Gait: Gait normal.   Psychiatric:         Mood and Affect: Mood normal.         Behavior: Behavior normal.         Thought Content: Thought content normal.         Judgment: Judgment normal.         Assessment& Plan     Diagnosis Orders   1. Thoracolumbar back pain  tiZANidine (ZANAFLEX) 2 MG tablet    naproxen (NAPROSYN) 500 MG tablet   2.  Acute left-sided thoracic back pain  naproxen (NAPROSYN) 500 MG tablet     Continue with PT, naprosyn and zanaflex. F/u as scheduled in August or sooner PRN. Side effects, adverse effects of the medication prescribed today, as well as treatment plan/ rationale and result expectations have been discussed with the patient who expresses understanding and desires to proceed. Close follow up to evaluate treatment results and for coordination of care. I have reviewed the patient's medical history in detail and updated the computerized patient record. As always, patient is advised that if symptoms worsen in any way they will proceed to the nearest emergency room. No orders of the defined types were placed in this encounter. Orders Placed This Encounter   Medications    tiZANidine (ZANAFLEX) 2 MG tablet     Sig: Take 1 tablet by mouth 3 times daily as needed (muscle spasms)     Dispense:  30 tablet     Refill:  0    naproxen (NAPROSYN) 500 MG tablet     Sig: Take 1 tablet by mouth 2 times daily (with meals)     Dispense:  60 tablet     Refill:  1       Return for as scheduled. Yajaira Perez, APRN - CNP

## 2020-07-27 ENCOUNTER — HOSPITAL ENCOUNTER (OUTPATIENT)
Dept: PHYSICAL THERAPY | Age: 70
Setting detail: THERAPIES SERIES
Discharge: HOME | End: 2020-07-27
Payer: MEDICARE

## 2020-07-27 PROCEDURE — 97110 THERAPEUTIC EXERCISES: CPT

## 2020-07-27 PROCEDURE — G0283 ELEC STIM OTHER THAN WOUND: HCPCS

## 2020-07-27 PROCEDURE — 97140 MANUAL THERAPY 1/> REGIONS: CPT

## 2020-07-27 ASSESSMENT — PAIN DESCRIPTION - LOCATION: LOCATION: BACK;FLANK

## 2020-07-27 ASSESSMENT — PAIN DESCRIPTION - ORIENTATION: ORIENTATION: MID;LEFT

## 2020-07-27 ASSESSMENT — PAIN DESCRIPTION - DESCRIPTORS: DESCRIPTORS: SORE;TIGHTNESS

## 2020-07-27 ASSESSMENT — PAIN DESCRIPTION - PAIN TYPE: TYPE: CHRONIC PAIN

## 2020-07-27 ASSESSMENT — PAIN SCALES - GENERAL: PAINLEVEL_OUTOF10: 1

## 2020-07-27 NOTE — PROGRESS NOTES
[address] [address]  Outpatient Physical Therapy    Treatment Note        Date: 2020  Patient: Wendi Kidd  : 1950  ACCT #: [de-identified]  Referring Practitioner: Erika Flores CNP  Diagnosis: Acute L sided thoracic back pain     Visit Information:  PT Visit Information  Onset Date: 20  PT Insurance Information: Medicare   Total # of Visits to Date: 6  Plan of Care/Certification Expiration Date: 10/08/20  No Show: 0  Canceled Appointment: 0  Progress Note Counter: 6/10 (PN due 20)    Subjective: Pt reports feeling better since last visit, has backed off on some of her exercises. No radiating pain and spasms have stopped. Conts to have pain with coughing and sneezing that can reach 9/10. Pt also reports that she feels as if she is \"walking funny. \"  Comments: RTD 20  HEP Compliance:  [x] Good [] Fair [] Poor [] Reports not doing due to:    Vital Signs  Patient Currently in Pain: Yes   Pain Screening  Patient Currently in Pain: Yes  Pain Assessment  Pain Assessment: 0-10  Pain Level: 1  Pain Type: Chronic pain  Pain Location: Back;Flank  Pain Orientation: Mid;Left  Pain Descriptors: Sore;Tightness    OBJECTIVE:   Exercises  Exercise 1: Seated  SB stretch over PBall 20 sec x 3 B  Exercise 2: S/L L upper trunk rotation (Right S/L) 2x5  Exercise 3: Pball roll up wall for thoracic ext 10 sec x 8  Exercise 10: rows/lats YTB x15 ea  Exercise 11: pulleys x4 min, progress to UBE as shaila*  Exercise 12: concurrent ER w/ YTB x10  Exercise 13: LT bow and arrow YTB x5  Exercise 20: HEP: cont current    Strength: [x] NT  [] MMT completed:    ROM: [] NT  [x] ROM measurements:  Spine  Thoracic: R thoracolumbar rotation 75%, L WNL's  Lumbar: flex 75% L SB WNL, R SB 75%, ext 50% limited by pain    Manual:   Manual therapy  Joint mobilization: Reverse thoracic SNAGS T7-T12 (w/ use of Mulligan pad), T11-T12 with thoracic extension 2x10  Soft Tissue Mobalization: STM/TPR L T7-T12 parapsinals with tennis ball  Other: 17 min total manual    Modalities:  Modalities  Moist heat: x10 min to midback to decrease mm tightness and spasms  E-stim (parameters): IFC to thoracolumbar paraspinals 10 min in seated w/ HP      *Indicates exercise, modality, or manual techniques to be initiated when appropriate    Assessment: Body structures, Functions, Activity limitations: Decreased functional mobility , Decreased ADL status, Decreased ROM, Decreased strength, Increased pain, Decreased posture  Assessment: Pt demos slight R hip hike with ambulation, measured for LLD with L Leg shorter than right. Educated pt on use of heel lift to further assist with ambulation to maintain level pelvis. Pt with improved thoracolumbar SB and R rotation though slight decrease in extension secondary to pain. Pt also with decreased toleranceto prone position this date d/t pain. Otherwise good tolerance to tx.   Treatment Diagnosis: L sided thoracic pain, decreased postural awareness, decreased B periscpaular strength, decreased thoracic mobility, decreased thoracolumbar arom, and decreased functional activity tolerance  Prognosis: Fair, Good     Goals:  Short term goals  Time Frame for Short term goals: 2-3 weeks   Short term goal 1: The pt will demonstrate improved postural awareness requiring <25% VC's with exercises  Short term goal 2: The pt will report decreased L thoracic pain >/=75% to increase ease with ADL's     Long term goals  Time Frame for Long term goals : 5 weeks   Long term goal 1: The pt will have a decrease in ADRIANNE score >/=10 points in order to increase functional activity tolerance   Long term goal 2: The pt will be indep/compliant with HEP in order to self manage symptoms upon D/C  Long term goal 3: The pt will demonstrate improved B periscapular strength >/=4+/5 in order to lift/carry with decreased pain   Long term goal 4: The pt will demo improved thoracolumbar AROM WNL's in order to increase ease with bed mobility   Progress toward goals: good towards rom except extension secondary to pain    POST-PAIN       Pain Rating (0-10 pain scale):   0/10   Location and pain description same as pre-treatment unless indicated. Action: [] NA   [x] Perform HEP  [] Meds as prescribed  [x] Modalities as prescribed   [] Call Physician     Frequency/Duration:  Plan  Times per week: 2  Plan weeks: 5  Specific instructions for Next Treatment: consider U/S to L parapsinals  Current Treatment Recommendations: Strengthening, ROM, Manual Therapy - Joint Manipulation, Manual Therapy - Soft Tissue Mobilization, Patient/Caregiver Education & Training, Pain Management, Home Exercise Program, Modalities, Positioning, Integrated Dry Needling     Pt to continue current HEP. See objective section for any therapeutic exercise changes, additions or modifications this date.     PT Individual Minutes  Time In: 4976  Time Out: 1068  Minutes: 50  Timed Code Treatment Minutes: 38 Minutes  Procedure Minutes: 10 MHP/IFC      Timed Activity Minutes Units   Ther Ex 21 2   Manual  17 1       Signature:  Electronically signed by Ayo Peck PT on 7/27/20 at 11:25 AM EDT

## 2020-07-31 ENCOUNTER — HOSPITAL ENCOUNTER (OUTPATIENT)
Dept: PHYSICAL THERAPY | Age: 70
Setting detail: THERAPIES SERIES
Discharge: HOME | End: 2020-07-31
Payer: MEDICARE

## 2020-07-31 PROCEDURE — G0283 ELEC STIM OTHER THAN WOUND: HCPCS

## 2020-07-31 PROCEDURE — 97110 THERAPEUTIC EXERCISES: CPT

## 2020-07-31 PROCEDURE — 97140 MANUAL THERAPY 1/> REGIONS: CPT

## 2020-07-31 ASSESSMENT — PAIN DESCRIPTION - LOCATION: LOCATION: BACK;FLANK

## 2020-07-31 ASSESSMENT — PAIN DESCRIPTION - DESCRIPTORS: DESCRIPTORS: SORE;TIGHTNESS

## 2020-07-31 ASSESSMENT — PAIN DESCRIPTION - ORIENTATION: ORIENTATION: LEFT;MID

## 2020-07-31 ASSESSMENT — PAIN SCALES - GENERAL: PAINLEVEL_OUTOF10: 6

## 2020-07-31 NOTE — PROGRESS NOTES
[address]     Ph: [phone]  Fax: [phone]    [] Certification  [] Recertification []  Plan of Care  [x] Progress Note [] Discharge      To:  Referring Practitioner: Lexie Gupta CNP      From:  Chris Juan PT, DPT  Patient: Briana Franklin     : 1950  Diagnosis: Acute L sided thoracic back pain      Date: 2020  Treatment Diagnosis: L sided thoracic pain, decreased postural awareness, decreased B periscpaular strength, decreased thoracic mobility, decreased thoracolumbar arom, and decreased functional activity tolerance    Plan of Care/Certification Expiration Date: 10/08/20  Progress Report Period from:  2020  to 2020    Total # of Visits to Date: 7   No Show: 0    Canceled Appointment: 0     OBJECTIVE:   Short Term Goals - Time Frame for Short term goals: 2-3 weeks     Goals Current/Discharge status  Met   Short term goal 1: The pt will demonstrate improved postural awareness requiring <25% VC's with exercises  Pt cont's to require cues for posture >/= 25% of the time (pt often guarded d/t pain) [] yes  [x] no   Short term goal 2: The pt will report decreased L thoracic pain >/=75% to increase ease with ADL's   Pain ranges 0-9/10, difficulty w/ ADL's still [] yes  [x] no     Long Term Goals - Time Frame for Long term goals : 5 weeks   Goals Current/ Discharge status Met   Long term goal 1: The pt will have a decrease in ADRIANNE score >/=10 points in order to increase functional activity tolerance  ADRIANNE 25/50 _ yes  [x] no   Long term goal 2: The pt will be indep/compliant with HEP in order to self manage symptoms upon D/C Ongoing, indep w/ current [x] yes  [] no   Long term goal 3: The pt will demonstrate improved B periscapular strength >/=4+/5 in order to lift/carry with decreased pain  Strength RUE  Comment: 4+/5 grossly except MT, rhombiods, Lats, LT 4/5(modified to testing in seated d/t pain lying in prone)   Pt reports cont'd difficulty w/ lifting/carrying  yes  [x] no   Long term goal 4: The pt will demo improved thoracolumbar AROM WNL's in order to increase ease with bed mobility  Spine  Thoracic: R thoracolumbar rotation 75%, L WNL's  Lumbar: flex 75% L SB WNL, R SB 75%, ext 50% limited by pain [] yes  [x] no     Body structures, Functions, Activity limitations: Decreased functional mobility , Decreased ADL status, Decreased ROM, Decreased strength, Increased pain, Decreased posture  Assessment: Pt w/ recent increased pain and new onset of b/l LE rad sx's w/ feeling of b/l knee buckling, though pt denies LOB or fall. Pt w/ minimal progress towards goals since beginning PT, though pt reports dec spasm and radicular sx's into ribcage, despite contd pain at thoracic spine. Pt to be placed on hold at this time to follow up w/ MD re: ongoing pain and new onset of b/l LE rad sx's. Prognosis: Fair, Good    PLAN:   Frequency/Duration:  Plan  Plan Comment: Pt to be placed on HOLD until follow up w/ MD re: sx's. Precautions: Osteopenia                   Patient Status:Hold as above     Signature: Electronically signed by Jaylon Garcia PT on 7/31/2020 at 11:31 AM  Objective Measures Obtained By: Electronically signed by Arsenio Kirk PTA on 7/31/20 at 11:12 AM EDT    If you have any questions or concerns, please don't hesitate to call.   Thank you for your referral.

## 2020-07-31 NOTE — PROGRESS NOTES
[address] [address]  Outpatient Physical Therapy    Treatment Note        Date: 2020  Patient: Yash Yusuf  : 1950  ACCT #: [de-identified]  Referring Practitioner: Amos Sexton CNP  Diagnosis: Acute L sided thoracic back pain     Visit Information:  PT Visit Information  Onset Date: 20  PT Insurance Information: Medicare   Total # of Visits to Date: 7  Plan of Care/Certification Expiration Date: 10/08/20  No Show: 0  Canceled Appointment: 0  Progress Note Counter: 7/10 (PN due 20)    Subjective: Pt reports increased pain today stating \"it's just that one spot (pt point to T-spine). \" Pt denies spasms or radiating pain, however notes numbness in b/l thighs w/ occ knee buckling. Pt ambulating into clinic very slow and guarded, holding onto wall frequently. Discussed w/ Chana Hollingsworth PT, DPT who suggested pt be placed on hold until follow up w/ MD. Pt reports pain ranges 0-9/10.   Comments: RTD 2020  HEP Compliance:  [x] Good [] Fair [] Poor [] Reports not doing due to:    Vital Signs  Patient Currently in Pain: Yes   Pain Screening  Patient Currently in Pain: Yes  Pain Assessment  Pain Assessment: 0-10  Pain Level: 6  Pain Location: Back;Flank  Pain Orientation: Left;Mid  Pain Descriptors: Sore;Tightness    OBJECTIVE:   Exercises  Exercise 11: pulleys x4 min, progress to UBE as shaila*  Exercise 19: objective measures, reviewed HEP, and PT hold process x15 min    Strength: [] NT  [x] MMT completed:  Strength RUE  Comment: 4+/5 grossly except MT, rhombiods, Lats, LT 4/5(modified to testing in seated d/t pain lying in prone)    ROM: [] NT  [x] ROM measurements:  Spine  Thoracic: R thoracolumbar rotation 75%, L WNL's  Lumbar: flex 75% L SB WNL, R SB 75%, ext 50% limited by pain     Manual:   Manual therapy  Soft Tissue Mobalization: STM/TPR L T7-T12 parapsinals with and without tennis ball in L sidelying d/t inc pain while prone x15 min    Modalities:  Modalities  Moist heat: x10 min to midback to decrease mm tightness and spasms  E-stim (parameters): IFC to thoracolumbar paraspinals 10 min in seated w/ HP      *Indicates exercise, modality, or manual techniques to be initiated when appropriate    Assessment: Body structures, Functions, Activity limitations: Decreased functional mobility , Decreased ADL status, Decreased ROM, Decreased strength, Increased pain, Decreased posture  Assessment: Pt w/ increased pain and new onset of b/l LE rad sx's w/ feeling of b/l knee buckling, though pt denies LOB or fall. Pt w/ minimal progress towards goals since beginning PT, though pt reports dec spasm and rad sx's into ribcage, despite pain at thoracic spine. Pt reports most relief w/ manual STM and modalities. Pt to be placed on hold at this time to follow up w/ MD re: ongoing pain and b/l LE rad sx's.   Treatment Diagnosis: L sided thoracic pain, decreased postural awareness, decreased B periscpaular strength, decreased thoracic mobility, decreased thoracolumbar arom, and decreased functional activity tolerance  Prognosis: Fair, Good     Goals:  Short term goals  Time Frame for Short term goals: 2-3 weeks   Short term goal 1: The pt will demonstrate improved postural awareness requiring <25% VC's with exercises  Short term goal 2: The pt will report decreased L thoracic pain >/=75% to increase ease with ADL's   Long term goals  Time Frame for Long term goals : 5 weeks   Long term goal 1: The pt will have a decrease in ADRIANNE score >/=10 points in order to increase functional activity tolerance   Long term goal 2: The pt will be indep/compliant with HEP in order to self manage symptoms upon D/C  Long term goal 3: The pt will demonstrate improved B periscapular strength >/=4+/5 in order to lift/carry with decreased pain   Long term goal 4: The pt will demo improved thoracolumbar AROM WNL's in order to increase ease with bed mobility   Progress toward goals: see PN    POST-PAIN       Pain Rating (0-10 pain scale):   0/10   Location and pain description same as pre-treatment unless indicated. Action: [] NA   [x] Perform HEP  [] Meds as prescribed  [] Modalities as prescribed   [] Call Physician     Frequency/Duration:  Plan  Plan Comment: Pt to be placed on HOLD until follow up w/ MD re: sx's. Pt to continue current HEP. See objective section for any therapeutic exercise changes, additions or modifications this date.     PT Individual Minutes  Time In: 7566  Time Out: 1050  Minutes: 48  Timed Code Treatment Minutes: 38 Minutes  Procedure Minutes:10 estim/mhp     Timed Activity Minutes Units   Ther Ex 8 1   Manual  30 2       Signature:  Electronically signed by Samy Horton PTA on 7/31/20 at 10:15 AM EDT

## 2020-08-03 ENCOUNTER — HOSPITAL ENCOUNTER (OUTPATIENT)
Dept: PHYSICAL THERAPY | Age: 70
Setting detail: THERAPIES SERIES
Discharge: HOME | End: 2020-08-03
Payer: MEDICARE

## 2020-08-06 ENCOUNTER — OFFICE VISIT (OUTPATIENT)
Dept: FAMILY MEDICINE CLINIC | Age: 70
End: 2020-08-06
Payer: MEDICARE

## 2020-08-06 VITALS
TEMPERATURE: 97.4 F | DIASTOLIC BLOOD PRESSURE: 80 MMHG | BODY MASS INDEX: 28.89 KG/M2 | SYSTOLIC BLOOD PRESSURE: 176 MMHG | OXYGEN SATURATION: 99 % | WEIGHT: 157 LBS | HEART RATE: 55 BPM | HEIGHT: 62 IN

## 2020-08-06 PROCEDURE — 4040F PNEUMOC VAC/ADMIN/RCVD: CPT | Performed by: NURSE PRACTITIONER

## 2020-08-06 PROCEDURE — G8417 CALC BMI ABV UP PARAM F/U: HCPCS | Performed by: NURSE PRACTITIONER

## 2020-08-06 PROCEDURE — 1090F PRES/ABSN URINE INCON ASSESS: CPT | Performed by: NURSE PRACTITIONER

## 2020-08-06 PROCEDURE — G8427 DOCREV CUR MEDS BY ELIG CLIN: HCPCS | Performed by: NURSE PRACTITIONER

## 2020-08-06 PROCEDURE — 3017F COLORECTAL CA SCREEN DOC REV: CPT | Performed by: NURSE PRACTITIONER

## 2020-08-06 PROCEDURE — 99214 OFFICE O/P EST MOD 30 MIN: CPT | Performed by: NURSE PRACTITIONER

## 2020-08-06 PROCEDURE — 1036F TOBACCO NON-USER: CPT | Performed by: NURSE PRACTITIONER

## 2020-08-06 PROCEDURE — 1123F ACP DISCUSS/DSCN MKR DOCD: CPT | Performed by: NURSE PRACTITIONER

## 2020-08-06 PROCEDURE — G8399 PT W/DXA RESULTS DOCUMENT: HCPCS | Performed by: NURSE PRACTITIONER

## 2020-08-06 RX ORDER — PREDNISONE 20 MG/1
20 TABLET ORAL 2 TIMES DAILY
Qty: 10 TABLET | Refills: 0 | Status: ON HOLD | OUTPATIENT
Start: 2020-08-06 | End: 2020-08-16 | Stop reason: HOSPADM

## 2020-08-06 RX ORDER — TRAMADOL HYDROCHLORIDE 50 MG/1
50 TABLET ORAL EVERY 6 HOURS PRN
Qty: 12 TABLET | Refills: 0 | Status: ON HOLD | OUTPATIENT
Start: 2020-08-06 | End: 2020-08-16 | Stop reason: HOSPADM

## 2020-08-06 ASSESSMENT — ENCOUNTER SYMPTOMS
CHEST TIGHTNESS: 0
PHOTOPHOBIA: 0
ABDOMINAL PAIN: 1
SHORTNESS OF BREATH: 0
BACK PAIN: 1
COUGH: 0

## 2020-08-06 NOTE — PROGRESS NOTES
Subjective  Chief Complaint   Patient presents with    6 Month Follow-Up     HTN    Numbness     states that she is now having numbness in her legs    Flank Pain     states that she is having bilateral flank that radiates around to the LT side under her breast. states that it is painful to wear a bra. HPI     F/u back pain. Back pain started worsening last week, having intermittent numbness in bilateral legs, usually down to knees; however at night will feel it down to her toes. Knees will buckle when going down stairs. Discomfort is constant. Sitting helps, worse at night. Difficulty ambulating, now ambulating with cane.       Past Medical History:   Diagnosis Date    Acne rosacea     Actinic dermatitis     biopsy proven 1/2018    Anxiety     Dermatitis     Hypertension     Osteopenia      Patient Active Problem List    Diagnosis Date Noted    Actinic dermatitis     Acne rosacea     Dermatitis     Hypertension     Osteopenia     Anxiety      Past Surgical History:   Procedure Laterality Date    CATARACT REMOVAL      bilateral     Family History   Problem Relation Age of Onset    Diabetes Mother     Hypertension Mother     Hypertension Father     Breast Cancer Daughter      Social History     Socioeconomic History    Marital status:      Spouse name: None    Number of children: None    Years of education: None    Highest education level: None   Occupational History    None   Social Needs    Financial resource strain: Not hard at all   [employer] insecurity     Worry: Never true     Inability: Never true    Transportation needs     Medical: No     Non-medical: No   Tobacco Use    Smoking status: Never Smoker    Smokeless tobacco: Never Used   Substance and Sexual Activity    Alcohol use: No     Alcohol/week: 0.0 standard drinks    Drug use: No    Sexual activity: None   Lifestyle    Physical activity     Days per week: None     Minutes per session: None    Stress: None Relationships    Social connections     Talks on phone: None     Gets together: None     Attends Hoahaoism service: None     Active member of club or organization: None     Attends meetings of clubs or organizations: None     Relationship status: None    Intimate partner violence     Fear of current or ex partner: None     Emotionally abused: None     Physically abused: None     Forced sexual activity: None   Other Topics Concern    None   Social History Narrative    None     Current Outpatient Medications on File Prior to Visit   Medication Sig Dispense Refill    tiZANidine (ZANAFLEX) 2 MG tablet Take 1 tablet by mouth 3 times daily as needed (muscle spasms) 30 tablet 0    naproxen (NAPROSYN) 500 MG tablet Take 1 tablet by mouth 2 times daily (with meals) 60 tablet 1    atenolol (TENORMIN) 50 MG tablet Take 1 tablet by mouth daily 90 tablet 1    alendronate (FOSAMAX) 70 MG tablet Take 1 tablet by mouth every 7 days 4 tablet 12    metroNIDAZOLE (METROGEL) 0.75 % gel Apply topically 2 times daily. 1 Tube 1    acetaminophen (TYLENOL) 650 MG CR tablet Take 650 mg by mouth every 8 hours as needed for Pain       No current facility-administered medications on file prior to visit. Allergies   Allergen Reactions    Codeine Rash       Review of Systems   Constitutional: Negative for chills, diaphoresis, fatigue and fever. HENT: Negative for congestion and ear pain. Eyes: Negative for photophobia and visual disturbance. Respiratory: Negative for cough, chest tightness and shortness of breath. Cardiovascular: Negative for chest pain, palpitations and leg swelling. Gastrointestinal: Positive for abdominal pain (upper abdomen and bilateral sides ). Genitourinary: Positive for frequency. Negative for difficulty urinating, dysuria, hematuria and urgency. Musculoskeletal: Positive for arthralgias, back pain, gait problem and myalgias. Neurological: Positive for numbness.  Negative for dizziness Coordination: Coordination is intact. Comments: Numbness to bilateral knees  Intermittent numbness to dorsal aspects of bilateral feet  Abnormal gait related to bilateral knees buckling when walking, pt visibly uncomfortable during ambulation requiring assist of one person plus cane for safe ambulation d/t instability  Bilateral lower ext strength equal and strong with flexion and extension     Psychiatric:         Mood and Affect: Mood normal.         Behavior: Behavior normal.         Thought Content: Thought content normal.         Judgment: Judgment normal.         Assessment& Plan     Diagnosis Orders   1. Chronic bilateral thoracic back pain  predniSONE (DELTASONE) 20 MG tablet    traMADol (ULTRAM) 50 MG tablet    [facility] - ([facility]) - Jeannine Ventura MD, Neurosurgery, [place]    MRI THORACIC SPINE W WO CONTRAST   2. Pain of upper abdomen  XR ABDOMEN (KUB) (SINGLE AP VIEW)   3. Bilateral flank pain  XR ABDOMEN (KUB) (SINGLE AP VIEW)    MRI LUMBAR SPINE W WO CONTRAST    MRI THORACIC SPINE W WO CONTRAST   4. Chronic bilateral low back pain with bilateral sciatica  predniSONE (DELTASONE) 20 MG tablet    [facility] - ([facility]) - Jeannine Ventura MD, Neurosurgery, [place]    MRI LUMBAR SPINE W WO CONTRAST   5. Bilateral leg numbness  predniSONE (DELTASONE) 20 MG tablet    [facility] - ([facility]) - Jeannine Ventura MD, Neurosurgery, [place]    MRI [place] as ordered above. Stat MRI's d/t new onset sudden worsened pain along with radiculopathy symptoms, concern for herniated disc with nerve impingement. Urgent referral to neurosurgery. Tramadol for pain, will monitor for allergic reaction d/t h/o allergic reaction to codeine. Prednisone as ordered. Discussed case with Dr. Franny Velazco who agrees with plan of care. ER for worsening symptoms or onset of red flag symptoms as discussed during visit.   Side effects, adverse effects of the medication prescribed today, as well as treatment plan/ rationale and result expectations have been discussed with the patient who expresses understanding and desires to proceed. Close follow up to evaluate treatment results and for coordination of care. I have reviewed the patient's medical history in detail and updated the computerized patient record. As always, patient is advised that if symptoms worsen in any way they will proceed to the nearest emergency room. Orders Placed This Encounter   Procedures    XR ABDOMEN (KUB) (SINGLE AP VIEW)     Standing Status:   Future     Number of Occurrences:   1     Standing Expiration Date:   8/6/2021     Order Specific Question:   Reason for exam:     Answer:   bilateral flank pain, upper abdominal pain    MRI LUMBAR SPINE W WO CONTRAST     Standing Status:   Future     Standing Expiration Date:   8/6/2021    MRI THORACIC SPINE W WO CONTRAST     Standing Status:   Future     Standing Expiration Date:   8/6/2021    [facility] - ([facility] - Laly Peres MD, Neurosurgery, [place]     Referral Priority:   Urgent     Referral Type:   Eval and Treat     Referral Reason:   Specialty Services Required     Referred to Provider:   Mateo Mars MD     Requested Specialty:   Neurosurgery     Number of Visits Requested:   1       Orders Placed This Encounter   Medications    predniSONE (DELTASONE) 20 MG tablet     Sig: Take 1 tablet by mouth 2 times daily for 5 days     Dispense:  10 tablet     Refill:  0    traMADol (ULTRAM) 50 MG tablet     Sig: Take 1 tablet by mouth every 6 hours as needed for Pain for up to 3 days. Intended supply: 3 days. Take lowest dose possible to manage pain     Dispense:  12 tablet     Refill:  0     Reduce doses taken as pain becomes manageable       Return if symptoms worsen or fail to improve.     Virgie Alvarado, APRN - CNP

## 2020-08-07 ENCOUNTER — HOSPITAL ENCOUNTER (INPATIENT)
Age: 70
LOS: 9 days | Discharge: NURSING, REHAB OR LONG-TERM CARE | DRG: 457 | End: 2020-08-16
Attending: EMERGENCY MEDICINE | Admitting: FAMILY MEDICINE
Payer: MEDICARE

## 2020-08-07 ENCOUNTER — HOSPITAL ENCOUNTER (OUTPATIENT)
Dept: MRI IMAGING | Age: 70
Discharge: HOME | DRG: 457 | End: 2020-08-09
Payer: MEDICARE

## 2020-08-07 PROBLEM — M48.04 SPINAL STENOSIS, THORACIC: Status: ACTIVE | Noted: 2020-08-07

## 2020-08-07 PROBLEM — M43.14: Status: ACTIVE | Noted: 2020-08-07

## 2020-08-07 PROBLEM — M80.08XA CRUSH FRACTURE OF VERTEBRA DUE TO OSTEOPOROSIS (HCC): Status: ACTIVE | Noted: 2020-08-07

## 2020-08-07 PROBLEM — S22.000A THORACIC COMPRESSION FRACTURE, CLOSED, INITIAL ENCOUNTER (HCC): Status: ACTIVE | Noted: 2020-08-07

## 2020-08-07 PROBLEM — G82.20 PARAPARESIS (HCC): Status: ACTIVE | Noted: 2020-08-07

## 2020-08-07 LAB
ABO/RH: NORMAL
ALBUMIN SERPL-MCNC: 4.3 G/DL (ref 3.5–4.6)
ALP BLD-CCNC: 53 U/L (ref 40–130)
ALT SERPL-CCNC: 27 U/L (ref 0–33)
ANION GAP SERPL CALCULATED.3IONS-SCNC: 12 MEQ/L (ref 9–15)
ANTIBODY SCREEN: NORMAL
APTT: 22.3 SEC (ref 24.4–36.8)
AST SERPL-CCNC: 22 U/L (ref 0–35)
BASOPHILS ABSOLUTE: 0.1 K/UL (ref 0–0.2)
BASOPHILS RELATIVE PERCENT: 0.7 %
BILIRUB SERPL-MCNC: 0.5 MG/DL (ref 0.2–0.7)
BUN BLDV-MCNC: 16 MG/DL (ref 8–23)
CALCIUM SERPL-MCNC: 10.2 MG/DL (ref 8.5–9.9)
CHLORIDE BLD-SCNC: 103 MEQ/L (ref 95–107)
CO2: 23 MEQ/L (ref 20–31)
CREAT SERPL-MCNC: 0.65 MG/DL (ref 0.5–0.9)
EKG ATRIAL RATE: 70 BPM
EKG P AXIS: 63 DEGREES
EKG P-R INTERVAL: 138 MS
EKG Q-T INTERVAL: 422 MS
EKG QRS DURATION: 124 MS
EKG QTC CALCULATION (BAZETT): 455 MS
EKG R AXIS: 78 DEGREES
EKG T AXIS: 16 DEGREES
EKG VENTRICULAR RATE: 70 BPM
EOSINOPHILS ABSOLUTE: 0 K/UL (ref 0–0.7)
EOSINOPHILS RELATIVE PERCENT: 0 %
GFR AFRICAN AMERICAN: >60
GFR NON-AFRICAN AMERICAN: >60
GLOBULIN: 4.2 G/DL (ref 2.3–3.5)
GLUCOSE BLD-MCNC: 114 MG/DL (ref 70–99)
HCT VFR BLD CALC: 33.6 % (ref 37–47)
HEMOGLOBIN: 11.6 G/DL (ref 12–16)
INR BLD: 1.1
LYMPHOCYTES ABSOLUTE: 0.7 K/UL (ref 1–4.8)
LYMPHOCYTES RELATIVE PERCENT: 7 %
MCH RBC QN AUTO: 33.8 PG (ref 27–31.3)
MCHC RBC AUTO-ENTMCNC: 34.6 % (ref 33–37)
MCV RBC AUTO: 97.7 FL (ref 82–100)
MONOCYTES ABSOLUTE: 0.2 K/UL (ref 0.2–0.8)
MONOCYTES RELATIVE PERCENT: 2.3 %
NEUTROPHILS ABSOLUTE: 8.4 K/UL (ref 1.4–6.5)
NEUTROPHILS RELATIVE PERCENT: 90 %
PDW BLD-RTO: 13.2 % (ref 11.5–14.5)
PLATELET # BLD: 266 K/UL (ref 130–400)
POTASSIUM SERPL-SCNC: 4.5 MEQ/L (ref 3.4–4.9)
PROTHROMBIN TIME: 14.6 SEC (ref 12.3–14.9)
RBC # BLD: 3.44 M/UL (ref 4.2–5.4)
SODIUM BLD-SCNC: 138 MEQ/L (ref 135–144)
TOTAL PROTEIN: 8.5 G/DL (ref 6.3–8)
WBC # BLD: 9.3 K/UL (ref 4.8–10.8)

## 2020-08-07 PROCEDURE — 85730 THROMBOPLASTIN TIME PARTIAL: CPT

## 2020-08-07 PROCEDURE — 96375 TX/PRO/DX INJ NEW DRUG ADDON: CPT

## 2020-08-07 PROCEDURE — 2580000003 HC RX 258: Performed by: EMERGENCY MEDICINE

## 2020-08-07 PROCEDURE — 93005 ELECTROCARDIOGRAM TRACING: CPT | Performed by: EMERGENCY MEDICINE

## 2020-08-07 PROCEDURE — 99285 EMERGENCY DEPT VISIT HI MDM: CPT

## 2020-08-07 PROCEDURE — 86900 BLOOD TYPING SEROLOGIC ABO: CPT

## 2020-08-07 PROCEDURE — 6360000004 HC RX CONTRAST MEDICATION: Performed by: NURSE PRACTITIONER

## 2020-08-07 PROCEDURE — 85025 COMPLETE CBC W/AUTO DIFF WBC: CPT

## 2020-08-07 PROCEDURE — 96374 THER/PROPH/DIAG INJ IV PUSH: CPT

## 2020-08-07 PROCEDURE — 86850 RBC ANTIBODY SCREEN: CPT

## 2020-08-07 PROCEDURE — 80053 COMPREHEN METABOLIC PANEL: CPT

## 2020-08-07 PROCEDURE — A9579 GAD-BASE MR CONTRAST NOS,1ML: HCPCS | Performed by: NURSE PRACTITIONER

## 2020-08-07 PROCEDURE — 85610 PROTHROMBIN TIME: CPT

## 2020-08-07 PROCEDURE — 6370000000 HC RX 637 (ALT 250 FOR IP): Performed by: NURSE PRACTITIONER

## 2020-08-07 PROCEDURE — 72157 MRI CHEST SPINE W/O & W/DYE: CPT

## 2020-08-07 PROCEDURE — 6360000002 HC RX W HCPCS: Performed by: EMERGENCY MEDICINE

## 2020-08-07 PROCEDURE — 86901 BLOOD TYPING SEROLOGIC RH(D): CPT

## 2020-08-07 PROCEDURE — 2580000003 HC RX 258: Performed by: NURSE PRACTITIONER

## 2020-08-07 PROCEDURE — 72158 MRI LUMBAR SPINE W/O & W/DYE: CPT

## 2020-08-07 PROCEDURE — 1210000000 HC MED SURG R&B

## 2020-08-07 PROCEDURE — 6360000002 HC RX W HCPCS: Performed by: NURSE PRACTITIONER

## 2020-08-07 RX ORDER — ACETAMINOPHEN 650 MG/1
650 SUPPOSITORY RECTAL EVERY 6 HOURS PRN
Status: DISCONTINUED | OUTPATIENT
Start: 2020-08-07 | End: 2020-08-16 | Stop reason: HOSPADM

## 2020-08-07 RX ORDER — 0.9 % SODIUM CHLORIDE 0.9 %
1000 INTRAVENOUS SOLUTION INTRAVENOUS ONCE
Status: COMPLETED | OUTPATIENT
Start: 2020-08-07 | End: 2020-08-07

## 2020-08-07 RX ORDER — PROMETHAZINE HYDROCHLORIDE 12.5 MG/1
12.5 TABLET ORAL EVERY 6 HOURS PRN
Status: DISCONTINUED | OUTPATIENT
Start: 2020-08-07 | End: 2020-08-16 | Stop reason: HOSPADM

## 2020-08-07 RX ORDER — POLYETHYLENE GLYCOL 3350 17 G/17G
17 POWDER, FOR SOLUTION ORAL DAILY PRN
Status: DISCONTINUED | OUTPATIENT
Start: 2020-08-07 | End: 2020-08-16 | Stop reason: HOSPADM

## 2020-08-07 RX ORDER — CALCIUM CARBONATE 500(1250)
500 TABLET ORAL DAILY
Status: DISCONTINUED | OUTPATIENT
Start: 2020-08-07 | End: 2020-08-16 | Stop reason: HOSPADM

## 2020-08-07 RX ORDER — ACETAMINOPHEN 325 MG/1
650 TABLET ORAL EVERY 6 HOURS PRN
Status: DISCONTINUED | OUTPATIENT
Start: 2020-08-07 | End: 2020-08-16 | Stop reason: HOSPADM

## 2020-08-07 RX ORDER — ONDANSETRON 2 MG/ML
4 INJECTION INTRAMUSCULAR; INTRAVENOUS EVERY 6 HOURS PRN
Status: DISCONTINUED | OUTPATIENT
Start: 2020-08-07 | End: 2020-08-16 | Stop reason: HOSPADM

## 2020-08-07 RX ORDER — CEFAZOLIN SODIUM 2 G/50ML
2 SOLUTION INTRAVENOUS ONCE
Status: COMPLETED | OUTPATIENT
Start: 2020-08-10 | End: 2020-08-10

## 2020-08-07 RX ORDER — ALENDRONATE SODIUM 70 MG/1
70 TABLET ORAL
Status: DISCONTINUED | OUTPATIENT
Start: 2020-08-08 | End: 2020-08-07 | Stop reason: CLARIF

## 2020-08-07 RX ORDER — CALCIUM CARBONATE 500(1250)
500 TABLET ORAL DAILY
COMMUNITY

## 2020-08-07 RX ORDER — TRAMADOL HYDROCHLORIDE 50 MG/1
50 TABLET ORAL EVERY 6 HOURS PRN
Status: DISCONTINUED | OUTPATIENT
Start: 2020-08-07 | End: 2020-08-16 | Stop reason: HOSPADM

## 2020-08-07 RX ORDER — ONDANSETRON 2 MG/ML
4 INJECTION INTRAMUSCULAR; INTRAVENOUS ONCE
Status: COMPLETED | OUTPATIENT
Start: 2020-08-07 | End: 2020-08-07

## 2020-08-07 RX ORDER — ATENOLOL 25 MG/1
50 TABLET ORAL DAILY
Status: DISCONTINUED | OUTPATIENT
Start: 2020-08-07 | End: 2020-08-16 | Stop reason: HOSPADM

## 2020-08-07 RX ORDER — SODIUM CHLORIDE 0.9 % (FLUSH) 0.9 %
10 SYRINGE (ML) INJECTION PRN
Status: DISCONTINUED | OUTPATIENT
Start: 2020-08-07 | End: 2020-08-16 | Stop reason: HOSPADM

## 2020-08-07 RX ORDER — SODIUM CHLORIDE 0.9 % (FLUSH) 0.9 %
10 SYRINGE (ML) INJECTION EVERY 12 HOURS SCHEDULED
Status: DISCONTINUED | OUTPATIENT
Start: 2020-08-07 | End: 2020-08-16 | Stop reason: HOSPADM

## 2020-08-07 RX ADMIN — HYDROMORPHONE HYDROCHLORIDE 0.5 MG: 1 INJECTION, SOLUTION INTRAMUSCULAR; INTRAVENOUS; SUBCUTANEOUS at 18:38

## 2020-08-07 RX ADMIN — ONDANSETRON 4 MG: 2 INJECTION INTRAMUSCULAR; INTRAVENOUS at 10:07

## 2020-08-07 RX ADMIN — Medication 10 ML: at 20:42

## 2020-08-07 RX ADMIN — SODIUM CHLORIDE 1000 ML: 9 INJECTION, SOLUTION INTRAVENOUS at 10:07

## 2020-08-07 RX ADMIN — TRAMADOL HYDROCHLORIDE 50 MG: 50 TABLET, FILM COATED ORAL at 17:28

## 2020-08-07 RX ADMIN — HYDROMORPHONE HYDROCHLORIDE 1 MG: 1 INJECTION, SOLUTION INTRAMUSCULAR; INTRAVENOUS; SUBCUTANEOUS at 10:07

## 2020-08-07 RX ADMIN — GADOTERIDOL 15 ML: 279.3 INJECTION, SOLUTION INTRAVENOUS at 08:39

## 2020-08-07 ASSESSMENT — ENCOUNTER SYMPTOMS
DIARRHEA: 0
ABDOMINAL PAIN: 0
NAUSEA: 0
SHORTNESS OF BREATH: 0
COUGH: 0
VOMITING: 0
SORE THROAT: 0
BACK PAIN: 1

## 2020-08-07 ASSESSMENT — PAIN SCALES - GENERAL
PAINLEVEL_OUTOF10: 5
PAINLEVEL_OUTOF10: 10
PAINLEVEL_OUTOF10: 9
PAINLEVEL_OUTOF10: 0
PAINLEVEL_OUTOF10: 4

## 2020-08-07 ASSESSMENT — PAIN DESCRIPTION - PROGRESSION: CLINICAL_PROGRESSION: GRADUALLY IMPROVING

## 2020-08-07 NOTE — ED PROVIDER NOTES
(TYLENOL) 650 MG CR TABLET    Take 650 mg by mouth every 8 hours as needed for Pain    ALENDRONATE (FOSAMAX) 70 MG TABLET    Take 1 tablet by mouth every 7 days    ATENOLOL (TENORMIN) 50 MG TABLET    Take 1 tablet by mouth daily    METRONIDAZOLE (METROGEL) 0.75 % GEL    Apply topically 2 times daily. NAPROXEN (NAPROSYN) 500 MG TABLET    Take 1 tablet by mouth 2 times daily (with meals)    PREDNISONE (DELTASONE) 20 MG TABLET    Take 1 tablet by mouth 2 times daily for 5 days    TIZANIDINE (ZANAFLEX) 2 MG TABLET    Take 1 tablet by mouth 3 times daily as needed (muscle spasms)    TRAMADOL (ULTRAM) 50 MG TABLET    Take 1 tablet by mouth every 6 hours as needed for Pain for up to 3 days. Intended supply: 3 days. Take lowest dose possible to manage pain       ALLERGIES     Codeine    FAMILY HISTORY       Family History   Problem Relation Age of Onset    Diabetes Mother     Hypertension Mother     Hypertension Father     Breast Cancer Daughter           SOCIAL HISTORY       Social History     Socioeconomic History    Marital status:       Spouse name: None    Number of children: None    Years of education: None    Highest education level: None   Occupational History    None   Social Needs    Financial resource strain: Not hard at all   [place] insecurity     Worry: Never true     Inability: Never true   [employer] needs     Medical: No     Non-medical: No   Tobacco Use    Smoking status: Never Smoker    Smokeless tobacco: Never Used   Substance and Sexual Activity    Alcohol use: No     Alcohol/week: 0.0 standard drinks    Drug use: No    Sexual activity: None   Lifestyle    Physical activity     Days per week: None     Minutes per session: None    Stress: None   Relationships    Social connections     Talks on phone: None     Gets together: None     Attends Congregation service: None     Active member of club or organization: None     Attends meetings of clubs or organizations: None Relationship status: None    Intimate partner violence     Fear of current or ex partner: None     Emotionally abused: None     Physically abused: None     Forced sexual activity: None   Other Topics Concern    None   Social History Narrative    None         PHYSICAL EXAM       ED Triage Vitals [08/07/20 0928]   BP Temp Temp Source Pulse Resp SpO2 Height Weight   (!) 148/69 96.8 °F (36 °C) Temporal 54 19 99 % 5' 2\" (1.575 m) 157 lb (71.2 kg)       Physical Exam  Vitals signs and nursing note reviewed. Constitutional:       Appearance: She is well-developed. HENT:      Head: Normocephalic. Right Ear: External ear normal.      Left Ear: External ear normal.   Eyes:      Conjunctiva/sclera: Conjunctivae normal.      Pupils: Pupils are equal, round, and reactive to light. Neck:      Musculoskeletal: Normal range of motion and neck supple. Cardiovascular:      Rate and Rhythm: Normal rate and regular rhythm. Heart sounds: Normal heart sounds. Pulmonary:      Effort: Pulmonary effort is normal.      Breath sounds: Normal breath sounds. Abdominal:      General: Bowel sounds are normal. There is no distension. Palpations: Abdomen is soft. Tenderness: There is no abdominal tenderness. Musculoskeletal: Normal range of motion. Skin:     General: Skin is warm and dry. Neurological:      Mental Status: She is alert and oriented to person, place, and time. Comments: 5/5 strength in bilateral upper extremities. Normal sensation in bilateral upper extremities. 4.5/5 strength in bilateral lower extremities. Normal sensation in bilateral lower extremities   Psychiatric:         Mood and Affect: Mood normal.           MDM  64 yo female presents to the ED with back pain, compression fx of the spine with foraminal narrowing. Pt is afebrile, hemodynamically stable. Pt given 1 L NS, IV dilaudid, IV zofran in the ED with moderate relief.   EKG shows NSR with HR 70, RBBB, normal axis, normal

## 2020-08-07 NOTE — H&P
Hospital Medicine History & Physical      PCP: LADONNA Baird CNP    Date of Admission: 8/7/2020    Date of Service: Pt seen/examined on 8/7/2020 and Admitted to Inpatient with expected LOS greater than two midnights due to medical therapy. Chief Complaint:  Back pain x 10 weeks       History Of Present Illness:      65 y.o. female with Pmhx of HTN , osteopenia who presented to [facility]  ED with reports of worsening  back pain. Patient stated that back pain started suddenly  about 10  weeks ago with no fall or trauma associated. She said she completed a 6 days course  of prednisone about a month ago with moderate relief however pain resumed again with some numbness on top of her feet when walking. Thoracic MRI done in the ED today suggestive of acute/subacute compression fracture of the T8 vertebral  body with 50- 60% height loss and severe canal narrowing with underlying cause to be osteoporosis/ osteopenia. Past Medical History:          Diagnosis Date    Acne rosacea     Actinic dermatitis     biopsy proven 1/2018    Anxiety     Dermatitis     Hypertension     Osteopenia        Past Surgical History:          Procedure Laterality Date    CATARACT REMOVAL      bilateral       Medications Prior to Admission:      Prior to Admission medications    Medication Sig Start Date End Date Taking? Authorizing Provider   calcium carbonate (OSCAL) 500 MG TABS tablet Take 500 mg by mouth daily   Yes Historical Provider, MD   predniSONE (DELTASONE) 20 MG tablet Take 1 tablet by mouth 2 times daily for 5 days 8/6/20 8/11/20 Yes LADONNA Baird CNP   traMADol (ULTRAM) 50 MG tablet Take 1 tablet by mouth every 6 hours as needed for Pain for up to 3 days. Intended supply: 3 days.  Take lowest dose possible to manage pain 8/6/20 8/9/20 Yes LADONNA Baird CNP   tiZANidine (ZANAFLEX) 2 MG tablet Take 1 tablet by mouth 3 times daily as needed (muscle spasms) 7/24/20  Yes Olivier Bedolla Dulce Turner, APRN - CNP   naproxen (NAPROSYN) 500 MG tablet Take 1 tablet by mouth 2 times daily (with meals) 7/24/20  Yes LADONNA Borjas - CNP   atenolol (TENORMIN) 50 MG tablet Take 1 tablet by mouth daily 4/28/20  Yes Eda Cruz APRN - CNP   alendronate (FOSAMAX) 70 MG tablet Take 1 tablet by mouth every 7 days 4/28/20  Yes Eda Cruz APRN - CNP   metroNIDAZOLE (METROGEL) 0.75 % gel Apply topically 2 times daily. 5/17/19   Eda Cruz APRN - CNP   acetaminophen (TYLENOL) 650 MG CR tablet Take 650 mg by mouth every 8 hours as needed for Pain    Historical Provider, MD       Allergies:  Codeine    Social History:      The patient currently lives @ home     TOBACCO:   reports that she has never smoked. She has never used smokeless tobacco.  ETOH:   reports no history of alcohol use. Family History:       Reviewed in detail and negative for DM, CAD, Cancer, CVA. Positive as follows:      Problem Relation Age of Onset    Diabetes Mother     Hypertension Mother     Hypertension Father     Breast Cancer Daughter        REVIEW OF SYSTEMS:   Pertinent positives as noted in the HPI. All other systems reviewed and negative. PHYSICAL EXAM:    BP (!) 153/75   Pulse 66   Temp 97.5 °F (36.4 °C) (Oral)   Resp 16   Ht 5' 2\" (1.575 m)   Wt 157 lb (71.2 kg)   LMP  (LMP Unknown)   SpO2 96%   BMI 28.72 kg/m²     General appearance:  No apparent distress, appears stated age and cooperative. HEENT:  Normal cephalic, atraumatic without obvious deformity. Pupils equal, round, and reactive to light. Extra ocular muscles intact. Conjunctivae/corneas clear. Neck: Supple, with full range of motion. No jugular venous distention. Trachea midline. Respiratory:  Normal respiratory effort. Clear to auscultation, bilaterally without Rales/Wheezes/Rhonchi. Cardiovascular:  Regular rate and rhythm with normal S1/S2 without murmurs, rubs or gallops.   Abdomen: Soft, non-tender, non-distended with normal bowel sounds. Musculoskeletal:  No clubbing, cyanosis or edema bilaterally. Skin: Skin color, texture, turgor normal.  No rashes or lesions. Neurologic:    Alert and oriented, thought content appropriate, normal insight  Capillary Refill: Brisk,< 3 seconds   Peripheral Pulses: +2 palpable, equal bilaterally       Labs:     Recent Labs     08/07/20  1023   WBC 9.3   HGB 11.6*   HCT 33.6*        Recent Labs     08/07/20  1021      K 4.5      CO2 23   BUN 16   CREATININE 0.65   CALCIUM 10.2*     Recent Labs     08/07/20  1021   AST 22   ALT 27   BILITOT 0.5   ALKPHOS 53     Recent Labs     08/07/20  1021   INR 1.1     No results for input(s): [employer] in the last 72 hours. Urinalysis:    No results found for: Lokesh Holman, [address] [facility] [address], [address] [place] [address], [facility] 994    Radiology:     CXR: I have reviewed the CXR with the following interpretation:  EKG:  I have reviewed the EKG with the following interpretation:     No orders to display       ASSESSMENT/Plan  :  Thoracic compression fracture: Pain control with narcotics,  Neurosurgery consult in the ED with recommendation for surgical intervention    HTN: Resume antihypertensive blood pressure monitoring    Pt can proceed with surgery with minimal surgical risk per  NSQIP calculator, patient has no  Pulmonary or cardiac compromise at this time. DVT Prophylaxis:SCD for now, anticipating surgery  Diet: DIET GENERAL;  Code Status: Full Code      Dispo -inpatient        LADONNA Mcginnis CNP    Thank you LADONNA Danielson CNP for the opportunity to be involved in this patient's care. If you have any questions or concerns please feel free to contact me. Attending Supervising Physician's Attestation Statement  I performed a history and physical examination on the patient and discussed the management with the nurse practitioner. I reviewed and agree with the findings and plan as documented in her note .     Noted

## 2020-08-07 NOTE — CONSULTS
75-year-old alert bright intelligent female who is actually enjoyed fairly good health except for some osteopenia with no known other fractures some hypertension. Back pain began in June 2020 undergoing an x-ray which is completely normal thoracic spine on 6/26/2020. She has had trouble going up and down the stairs for the last week every day just a little worse. The pain is been progressively getting severe to the point where she is having difficulty breathing and she just does not want to get up and go anywhere because the pain hits her across the back and around the ribs but mostly in the back. She now has a severe fracture at T8 with retropulsed bony material on the anterior aspect bilaterally of the spinal cord with mild paraparesis. ROS  Review of Systems   Constitutional: Negative for activity change, chills and fever. HENT: Negative for ear pain and sore throat. Eyes: Negative for visual disturbance. Respiratory: Negative for cough and shortness of breath. Cardiovascular: Negative for chest pain, palpitations and leg swelling. Gastrointestinal: Negative for abdominal pain, diarrhea, nausea and vomiting. Genitourinary: Negative for dysuria. Musculoskeletal: Positive for back pain. Skin: Negative for rash. Neurological: Positive for weakness and numbness into both lower extremities. . Negative for dizziness. Except as noted above the remainder of the review of systems was reviewed and negative.      Physical Exam  Vitals signs and nursing note reviewed. Constitutional:       Appearance: She is well-developed. HENT:      Head: Normocephalic. Right Ear: External ear normal.      Left Ear: External ear normal.   Eyes:      Conjunctiva/sclera: Conjunctivae normal.      Pupils: Pupils are equal, round, and reactive to light. Neck:      Musculoskeletal: Normal range of motion and neck supple. Cardiovascular:      Rate and Rhythm: Normal rate and regular rhythm.       Heart sounds: Normal heart sounds. Pulmonary:      Effort: Pulmonary effort is normal.      Breath sounds: Normal breath sounds. Abdominal:      General: Bowel sounds are normal. There is no distension. Palpations: Abdomen is soft. Tenderness: There is no abdominal tenderness. Musculoskeletal: Normal range of motion. Skin:     General: Skin is warm and dry. Neurological:      Mental Status: She is alert and oriented to person, place, and time. Comments: 5/5 strength in bilateral upper extremities. Normal sensation in bilateral upper extremities. 4.5/5 strength in bilateral lower extremities. Normal sensation in bilateral lower extremities   Psychiatric:         Mood and Affect: Mood normal.     Impression osteoporotic severe compression fracture T8 with paraparesis and instability. Plan T7-8-9 posterior decompression pedicle screw fusion T6-7 910. Will include biopsy if possible. Procedure indications and risks were discussed with the patient. The risks are small but still present including something serious like even death total paralysis sensory loss loss of bladder bowel control pain bleeding infection and so forth. Surgery is not a guarantee of normalcy. We cannot undo any permanent damage already done. We cannot change the underlying disease process. I have discussed alternative procedures including anterior approach for corpectomy and fusion risks and benefits. I have answered all of her questions. She understands and agrees to proceed. I showed the patient the x-ray and MRI images on the computer screen.     Plan for Monday 8 10 2020

## 2020-08-07 NOTE — ACP (ADVANCE CARE PLANNING)
were unable to breathe on your own, what would your preference be about the use of a ventilator (breathing machine) if it were available to you? \"      Would the patient desire the use of ventilator (breathing machine)?: yes    \"If your health worsens and it becomes clear that your chance of recovery is unlikely, what would your preference be about the use of a ventilator (breathing machine) if it were available to you? \"     Would the patient desire the use of ventilator (breathing machine)?: No      Resuscitation  \"CPR works best to restart the heart when there is a sudden event, like a heart attack, in someone who is otherwise healthy. Unfortunately, CPR does not typically restart the heart for people who have serious health conditions or who are very sick. \"    \"In the event your heart stopped as a result of an underlying serious health condition, would you want attempts to be made to restart your heart (answer \"yes\" for attempt to resuscitate) or would you prefer a natural death (answer \"no\" for do not attempt to resuscitate)? \" yes      NOTE: If the patient has a valid advance directive AND now provides care preference(s) that are inconsistent with that prior directive, advise the patient to consider either: creating a new advance directive that complies with state-specific requirements; or, if that is not possible, orally revoking that prior directive in accordance with state-specific requirements, which must be documented in the EHR. [x] Yes   [] No   Educated Patient / Marvene Deal regarding differences between Advance Directives and portable DNR orders.     Length of ACP Conversation in minutes:  10    Conversation Outcomes:  [x] ACP discussion completed  [] Existing advance directive reviewed with patient; no changes to patient's previously recorded wishes  [] New Advance Directive completed  [] Portable Do Not Rescitate prepared for Provider review and signature  [] POLST/POST/MOLST/MOST prepared for Provider review and signature      Follow-up plan:    [] Schedule follow-up conversation to continue planning  [x] Referred individual to Provider for additional questions/concerns   [] Advised patient/agent/surrogate to review completed ACP document and update if needed with changes in condition, patient preferences or care setting    [x] This note routed to one or more involved healthcare providers

## 2020-08-07 NOTE — CARE COORDINATION
[facility] Case Management Initial Discharge Assessment    Met with Patient to discuss discharge plan. PCP: LADONNA Whyte CNP                                Date of Last Visit: yesterday    If no PCP, list provided? N/A    Discharge Planning    Living Arrangements: independently at home    Who do you live with? self    Who helps you with your care:  self    If lives at home:     Do you have any barriers navigating in your home? no    Patient can perform ADL? Yes    Current Services (outpatient and in home) :  None    Dialysis: No    Is transportation available to get to your appointments? Yes    DME Equipment:  yes - cane    Respiratory equipment: None    Respiratory provider:  no     [facility]    Consult with Medication Assistance Program?  No        Does Patient Have a High-Risk for Readmission Diagnosis (CHF, PN, MI, COPD)? No      Initial Discharge Plan? (Note: please see concurrent daily documentation for any updates after initial note).       CM to assess for any further d/c needs or referrals, hhc/outpatient therapy/rehab      Electronically signed by Yulisa Moralez on 8/7/2020 at 12:04 PM

## 2020-08-07 NOTE — PROGRESS NOTES
PHARMACY NOTE  Johnna An was ordered alendronate (Fosamax) 70mg tablet one weekly . Per the [address], this medication is non-formulary and not stocked by pharmacy. The medication can be reordered at discharge.     Arnoldo Mustafa, PharmD   8/7/2020 1:58 PM

## 2020-08-07 NOTE — ED NOTES
Patient with back pain, some numbness to top of feet, pulses present.       Rock Travis RN  08/07/20 [id number]

## 2020-08-07 NOTE — ED TRIAGE NOTES
Pt sent to ER from MRI for tx, pt states she had the MRI for increasing back pain, pt denies pain at this time, last took tramadol at 530am, pt a&ox4, resp even and unlabored

## 2020-08-07 NOTE — FLOWSHEET NOTE
1316: pt arrived from ED via cart and was able to walk to the bed with a steady gait. Pt says she has had no recent falls or accidents. She is not sure how she injured her back. Pt states she does get intermittent numbness in both feet. Pulses are palpable. Pt can move all extremities with no problems. PERRLA. A&OX4. S1, S2 noted. Bowel sounds are active X4. 2nd skin assessment was done with Alexandra Traore RN. Pt has scattered scabbing on her arms and legs and a scratch on her left great toe. Perfect serve sent to Dr. Andrew Greene for clarification on diet orders and to notify  Him regarding home medications. 1854: pt attempted to walk to the restroom with a walker and could not make it because the pain was unbearable. We used a wheelchair to get her closer to her bed. Pt was medicated with dilaudid and wants to sit in the wheelchair for a while. . Pt has her call light in reach.

## 2020-08-08 PROBLEM — M47.14 MYELOPATHY OF THORACIC REGION: Status: ACTIVE | Noted: 2020-08-08

## 2020-08-08 LAB
ANION GAP SERPL CALCULATED.3IONS-SCNC: 12 MEQ/L (ref 9–15)
BACTERIA: ABNORMAL /HPF
BASOPHILS ABSOLUTE: 0 K/UL (ref 0–0.2)
BASOPHILS RELATIVE PERCENT: 0.5 %
BILIRUBIN URINE: NEGATIVE
BLOOD, URINE: ABNORMAL
BUN BLDV-MCNC: 16 MG/DL (ref 8–23)
CALCIUM SERPL-MCNC: 9.1 MG/DL (ref 8.5–9.9)
CHLORIDE BLD-SCNC: 104 MEQ/L (ref 95–107)
CLARITY: ABNORMAL
CO2: 22 MEQ/L (ref 20–31)
COLOR: YELLOW
CREAT SERPL-MCNC: 0.6 MG/DL (ref 0.5–0.9)
EOSINOPHILS ABSOLUTE: 0.1 K/UL (ref 0–0.7)
EOSINOPHILS RELATIVE PERCENT: 0.8 %
EPITHELIAL CELLS, UA: ABNORMAL /HPF (ref 0–5)
GFR AFRICAN AMERICAN: >60
GFR NON-AFRICAN AMERICAN: >60
GLUCOSE BLD-MCNC: 84 MG/DL (ref 70–99)
GLUCOSE URINE: NEGATIVE MG/DL
HCT VFR BLD CALC: 32.8 % (ref 37–47)
HEMOGLOBIN: 11.3 G/DL (ref 12–16)
HYALINE CASTS: ABNORMAL /HPF (ref 0–5)
KETONES, URINE: 15 MG/DL
LEUKOCYTE ESTERASE, URINE: ABNORMAL
LYMPHOCYTES ABSOLUTE: 1.9 K/UL (ref 1–4.8)
LYMPHOCYTES RELATIVE PERCENT: 24 %
MCH RBC QN AUTO: 34.2 PG (ref 27–31.3)
MCHC RBC AUTO-ENTMCNC: 34.5 % (ref 33–37)
MCV RBC AUTO: 99.3 FL (ref 82–100)
MONOCYTES ABSOLUTE: 0.6 K/UL (ref 0.2–0.8)
MONOCYTES RELATIVE PERCENT: 7.1 %
NEUTROPHILS ABSOLUTE: 5.4 K/UL (ref 1.4–6.5)
NEUTROPHILS RELATIVE PERCENT: 67.6 %
NITRITE, URINE: POSITIVE
PDW BLD-RTO: 13.5 % (ref 11.5–14.5)
PH UA: 5.5 (ref 5–9)
PLATELET # BLD: 235 K/UL (ref 130–400)
POTASSIUM REFLEX MAGNESIUM: 4.1 MEQ/L (ref 3.4–4.9)
PROTEIN UA: ABNORMAL MG/DL
RBC # BLD: 3.3 M/UL (ref 4.2–5.4)
RBC UA: ABNORMAL /HPF (ref 0–5)
SODIUM BLD-SCNC: 138 MEQ/L (ref 135–144)
SPECIFIC GRAVITY UA: 1.02 (ref 1–1.03)
UROBILINOGEN, URINE: 1 E.U./DL
VITAMIN D 25-HYDROXY: 30.1 NG/ML (ref 30–100)
WBC # BLD: 8 K/UL (ref 4.8–10.8)
WBC UA: ABNORMAL /HPF (ref 0–5)

## 2020-08-08 PROCEDURE — 1210000000 HC MED SURG R&B

## 2020-08-08 PROCEDURE — 85025 COMPLETE CBC W/AUTO DIFF WBC: CPT

## 2020-08-08 PROCEDURE — 81001 URINALYSIS AUTO W/SCOPE: CPT

## 2020-08-08 PROCEDURE — 6370000000 HC RX 637 (ALT 250 FOR IP): Performed by: NEUROLOGICAL SURGERY

## 2020-08-08 PROCEDURE — 36415 COLL VENOUS BLD VENIPUNCTURE: CPT

## 2020-08-08 PROCEDURE — 80048 BASIC METABOLIC PNL TOTAL CA: CPT

## 2020-08-08 PROCEDURE — 6370000000 HC RX 637 (ALT 250 FOR IP): Performed by: NURSE PRACTITIONER

## 2020-08-08 PROCEDURE — 6360000002 HC RX W HCPCS: Performed by: NURSE PRACTITIONER

## 2020-08-08 PROCEDURE — 82306 VITAMIN D 25 HYDROXY: CPT

## 2020-08-08 PROCEDURE — 2580000003 HC RX 258: Performed by: NURSE PRACTITIONER

## 2020-08-08 RX ORDER — TIZANIDINE 4 MG/1
4 TABLET ORAL EVERY 6 HOURS PRN
Status: DISCONTINUED | OUTPATIENT
Start: 2020-08-08 | End: 2020-08-16 | Stop reason: HOSPADM

## 2020-08-08 RX ADMIN — Medication 10 ML: at 08:36

## 2020-08-08 RX ADMIN — Medication 10 ML: at 22:08

## 2020-08-08 RX ADMIN — TRAMADOL HYDROCHLORIDE 50 MG: 50 TABLET, FILM COATED ORAL at 12:42

## 2020-08-08 RX ADMIN — HYDROMORPHONE HYDROCHLORIDE 0.5 MG: 1 INJECTION, SOLUTION INTRAMUSCULAR; INTRAVENOUS; SUBCUTANEOUS at 22:07

## 2020-08-08 RX ADMIN — TRAMADOL HYDROCHLORIDE 50 MG: 50 TABLET, FILM COATED ORAL at 06:16

## 2020-08-08 RX ADMIN — ONDANSETRON 4 MG: 2 INJECTION INTRAMUSCULAR; INTRAVENOUS at 17:16

## 2020-08-08 RX ADMIN — TIZANIDINE 4 MG: 4 TABLET ORAL at 21:03

## 2020-08-08 RX ADMIN — HYDROMORPHONE HYDROCHLORIDE 0.5 MG: 1 INJECTION, SOLUTION INTRAMUSCULAR; INTRAVENOUS; SUBCUTANEOUS at 07:35

## 2020-08-08 RX ADMIN — TIZANIDINE 4 MG: 4 TABLET ORAL at 08:36

## 2020-08-08 RX ADMIN — TRAMADOL HYDROCHLORIDE 50 MG: 50 TABLET, FILM COATED ORAL at 21:05

## 2020-08-08 RX ADMIN — CALCIUM 500 MG: 500 TABLET ORAL at 08:36

## 2020-08-08 RX ADMIN — ATENOLOL 50 MG: 25 TABLET ORAL at 08:36

## 2020-08-08 RX ADMIN — HYDROMORPHONE HYDROCHLORIDE 0.5 MG: 1 INJECTION, SOLUTION INTRAMUSCULAR; INTRAVENOUS; SUBCUTANEOUS at 17:07

## 2020-08-08 RX ADMIN — Medication 10 ML: at 21:05

## 2020-08-08 RX ADMIN — TRAMADOL HYDROCHLORIDE 50 MG: 50 TABLET, FILM COATED ORAL at 00:03

## 2020-08-08 ASSESSMENT — PAIN SCALES - GENERAL
PAINLEVEL_OUTOF10: 6
PAINLEVEL_OUTOF10: 10
PAINLEVEL_OUTOF10: 2
PAINLEVEL_OUTOF10: 4
PAINLEVEL_OUTOF10: 10
PAINLEVEL_OUTOF10: 6
PAINLEVEL_OUTOF10: 5
PAINLEVEL_OUTOF10: 5
PAINLEVEL_OUTOF10: 1
PAINLEVEL_OUTOF10: 2

## 2020-08-08 ASSESSMENT — PAIN DESCRIPTION - ORIENTATION: ORIENTATION: LEFT;MID

## 2020-08-08 ASSESSMENT — PAIN DESCRIPTION - PAIN TYPE: TYPE: CHRONIC PAIN

## 2020-08-08 ASSESSMENT — PAIN DESCRIPTION - LOCATION: LOCATION: BACK;FLANK

## 2020-08-08 NOTE — PROGRESS NOTES
Shift assessment complete. A&Ox4. PERRLA. No adventitious heart sounds; SB on tele. Scabbing on BUE and BLE. Tear on L great toe. Diminished lung sounds all fields. Hypoactive bowel sounds. Back pain with movement. Pain lessened in semi-fowlers position. Will continue to monitor.

## 2020-08-08 NOTE — PLAN OF CARE
Problem: Pain:  Goal: Pain level will decrease  Description: Pain level will decrease  Outcome: Met This Shift     Problem: Falls - Risk of:  Goal: Will remain free from falls  Description: Will remain free from falls  Outcome: Ongoing  Goal: Absence of physical injury  Description: Absence of physical injury  Outcome: Ongoing     Problem: Pain:  Goal: Control of acute pain  Description: Control of acute pain  Outcome: Ongoing  Goal: Control of chronic pain  Description: Control of chronic pain  Outcome: Ongoing

## 2020-08-08 NOTE — PROGRESS NOTES
Suffering from severe muscle spasms with any kind of movement cannot even use the bedpan in bed. Ordered tizanidine. Maintains good strength sensation lower extremities. Baseline x-rays preoperatively today thoracic spine.   Questions answered

## 2020-08-08 NOTE — FLOWSHEET NOTE
Pt was yelling in pain, vss, neuro surgery was in to evaluate. Medicated for pain as ordered, some relief, muscle relaxer given. Xray transport delayed as pt refused due to pain, purewick placed as pt concerned about getting on bedpan, anxious, support given cont to monitor Electronically signed by Dakota Oliver RN on 8/8/2020 at 8:41 AM     Pt son visiting, pt medicated for pain as ordered, has been using bedpan successfully, pt to be npo at 0000 for possible surgery tomm per orders. Call placed to xray re; order for xray of thoracic spine, no response received today from 0767.  Cont to monitor
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
## 2020-08-09 LAB
ANION GAP SERPL CALCULATED.3IONS-SCNC: 7 MEQ/L (ref 9–15)
BASOPHILS ABSOLUTE: 0 K/UL (ref 0–0.2)
BASOPHILS RELATIVE PERCENT: 0.4 %
BUN BLDV-MCNC: 16 MG/DL (ref 8–23)
CALCIUM SERPL-MCNC: 8.9 MG/DL (ref 8.5–9.9)
CHLORIDE BLD-SCNC: 100 MEQ/L (ref 95–107)
CO2: 28 MEQ/L (ref 20–31)
CREAT SERPL-MCNC: 0.58 MG/DL (ref 0.5–0.9)
EOSINOPHILS ABSOLUTE: 0 K/UL (ref 0–0.7)
EOSINOPHILS RELATIVE PERCENT: 0.8 %
GFR AFRICAN AMERICAN: >60
GFR NON-AFRICAN AMERICAN: >60
GLUCOSE BLD-MCNC: 102 MG/DL (ref 70–99)
HCT VFR BLD CALC: 30.8 % (ref 37–47)
HEMOGLOBIN: 10.7 G/DL (ref 12–16)
LYMPHOCYTES ABSOLUTE: 1.1 K/UL (ref 1–4.8)
LYMPHOCYTES RELATIVE PERCENT: 17.4 %
MCH RBC QN AUTO: 33.8 PG (ref 27–31.3)
MCHC RBC AUTO-ENTMCNC: 34.6 % (ref 33–37)
MCV RBC AUTO: 97.7 FL (ref 82–100)
MONOCYTES ABSOLUTE: 0.5 K/UL (ref 0.2–0.8)
MONOCYTES RELATIVE PERCENT: 8.5 %
NEUTROPHILS ABSOLUTE: 4.5 K/UL (ref 1.4–6.5)
NEUTROPHILS RELATIVE PERCENT: 72.9 %
PDW BLD-RTO: 13 % (ref 11.5–14.5)
PLATELET # BLD: 237 K/UL (ref 130–400)
POTASSIUM REFLEX MAGNESIUM: 4.2 MEQ/L (ref 3.4–4.9)
RBC # BLD: 3.16 M/UL (ref 4.2–5.4)
SODIUM BLD-SCNC: 135 MEQ/L (ref 135–144)
WBC # BLD: 6.1 K/UL (ref 4.8–10.8)

## 2020-08-09 PROCEDURE — 6370000000 HC RX 637 (ALT 250 FOR IP): Performed by: NURSE PRACTITIONER

## 2020-08-09 PROCEDURE — 6360000002 HC RX W HCPCS: Performed by: NURSE PRACTITIONER

## 2020-08-09 PROCEDURE — 80048 BASIC METABOLIC PNL TOTAL CA: CPT

## 2020-08-09 PROCEDURE — 36415 COLL VENOUS BLD VENIPUNCTURE: CPT

## 2020-08-09 PROCEDURE — 6370000000 HC RX 637 (ALT 250 FOR IP): Performed by: NEUROLOGICAL SURGERY

## 2020-08-09 PROCEDURE — 1210000000 HC MED SURG R&B

## 2020-08-09 PROCEDURE — 2580000003 HC RX 258: Performed by: NURSE PRACTITIONER

## 2020-08-09 PROCEDURE — 2580000003 HC RX 258: Performed by: INTERNAL MEDICINE

## 2020-08-09 PROCEDURE — 85025 COMPLETE CBC W/AUTO DIFF WBC: CPT

## 2020-08-09 PROCEDURE — 6360000002 HC RX W HCPCS: Performed by: INTERNAL MEDICINE

## 2020-08-09 RX ADMIN — TRAMADOL HYDROCHLORIDE 50 MG: 50 TABLET, FILM COATED ORAL at 04:11

## 2020-08-09 RX ADMIN — TRAMADOL HYDROCHLORIDE 50 MG: 50 TABLET, FILM COATED ORAL at 10:51

## 2020-08-09 RX ADMIN — HYDROMORPHONE HYDROCHLORIDE 0.5 MG: 1 INJECTION, SOLUTION INTRAMUSCULAR; INTRAVENOUS; SUBCUTANEOUS at 08:40

## 2020-08-09 RX ADMIN — HYDROMORPHONE HYDROCHLORIDE 0.5 MG: 1 INJECTION, SOLUTION INTRAMUSCULAR; INTRAVENOUS; SUBCUTANEOUS at 18:01

## 2020-08-09 RX ADMIN — HYDROMORPHONE HYDROCHLORIDE 0.5 MG: 1 INJECTION, SOLUTION INTRAMUSCULAR; INTRAVENOUS; SUBCUTANEOUS at 02:32

## 2020-08-09 RX ADMIN — Medication 10 ML: at 08:41

## 2020-08-09 RX ADMIN — TRAMADOL HYDROCHLORIDE 50 MG: 50 TABLET, FILM COATED ORAL at 23:04

## 2020-08-09 RX ADMIN — Medication 10 ML: at 02:32

## 2020-08-09 RX ADMIN — TIZANIDINE 4 MG: 4 TABLET ORAL at 10:51

## 2020-08-09 RX ADMIN — TIZANIDINE 4 MG: 4 TABLET ORAL at 04:11

## 2020-08-09 RX ADMIN — TIZANIDINE 4 MG: 4 TABLET ORAL at 23:04

## 2020-08-09 RX ADMIN — TRAMADOL HYDROCHLORIDE 50 MG: 50 TABLET, FILM COATED ORAL at 17:00

## 2020-08-09 RX ADMIN — Medication 10 ML: at 21:27

## 2020-08-09 RX ADMIN — TIZANIDINE 4 MG: 4 TABLET ORAL at 17:00

## 2020-08-09 RX ADMIN — CEFTRIAXONE SODIUM 1 G: 1 INJECTION, POWDER, FOR SOLUTION INTRAMUSCULAR; INTRAVENOUS at 00:18

## 2020-08-09 RX ADMIN — CALCIUM 500 MG: 500 TABLET ORAL at 08:39

## 2020-08-09 ASSESSMENT — PAIN SCALES - GENERAL
PAINLEVEL_OUTOF10: 0
PAINLEVEL_OUTOF10: 8
PAINLEVEL_OUTOF10: 0
PAINLEVEL_OUTOF10: 4
PAINLEVEL_OUTOF10: 5
PAINLEVEL_OUTOF10: 4
PAINLEVEL_OUTOF10: 10

## 2020-08-09 NOTE — CARE COORDINATION
Met with patient at the bedside to discuss discharge plan of care. Patient is independent at home but is aware she will most likely need help at discharge. Her son and daughter will be staying with her to offer as much assistance as possible. She is agreeable to [place] [id number] for therapy and nursing as needed. [place] of choice offered, she selected [facility] for her needs as she is familiar with agency. If inpatient therapy is needed, she would like [facility], again freedom of choice offered. [facility] would be her final/last choice for care. Patient is optimistic regarding post op needs and feels HHC will be sufficient. Case Management will continue to follow for any changes.

## 2020-08-09 NOTE — PROGRESS NOTES
Hospitalist Daily Progress Note  Name: James Castellanos  Age: 65 y.o. Gender: female  CodeStatus: Full Code  Allergies: Codeine    Chief Complaint:Other (pt sent from MRI for tx )      Primary Care Provider: LADONNA Costello CNP    InpatientTreatment Team: Treatment Team: Attending Provider: Shilpi Munoz MD; Consulting Physician: Isis Meek MD; Surgeon: Isis Meek MD; Utilization Reviewer: Felisa Gamino RN; Registered Nurse: Rebecca Mcnamara RN; Patient Care Tech: La Tang    Admission Date: 8/7/2020      Subjective: No chest pain, sob, nausea. Resting comfortably in bed. Physical Exam  Vitals signs and nursing note reviewed. Constitutional:       Appearance: Normal appearance. Cardiovascular:      Rate and Rhythm: Normal rate and regular rhythm. Pulmonary:      Effort: Pulmonary effort is normal.      Breath sounds: Normal breath sounds. Abdominal:      General: Bowel sounds are normal.      Palpations: Abdomen is soft. Musculoskeletal: Normal range of motion. Skin:     General: Skin is warm and dry. Neurological:      Mental Status: She is alert and oriented to person, place, and time. Mental status is at baseline.          Medications:  Reviewed    Infusion Medications:   Scheduled Medications:    cefTRIAXone (ROCEPHIN) IV  1 g Intravenous Q24H    sodium chloride flush  10 mL Intravenous 2 times per day    atenolol  50 mg Oral Daily    calcium elemental  500 mg Oral Daily    [START ON 8/10/2020] ceFAZolin  2 g Intravenous Once     PRN Meds: tiZANidine, sodium chloride flush, acetaminophen **OR** acetaminophen, polyethylene glycol, promethazine **OR** ondansetron, HYDROmorphone, traMADol    Labs:   Recent Labs     08/07/20  1023 08/08/20  0556 08/09/20  0547   WBC 9.3 8.0 6.1   HGB 11.6* 11.3* 10.7*   HCT 33.6* 32.8* 30.8*    235 237     Recent Labs     08/07/20  1021 08/08/20  0556 08/09/20  0547    138 135   K 4.5 4.1 4.2    104 100   CO2 23 22 28 BUN 16 16 16   CREATININE 0.65 0.60 0.58   CALCIUM 10.2* 9.1 8.9     Recent Labs     08/07/20  1021   AST 22   ALT 27   BILITOT 0.5   ALKPHOS 53     Recent Labs     08/07/20  1021   INR 1.1     No results for input(s): Isael Smoker in the last 72 hours. Urinalysis:   Lab Results   Component Value Date    NITRU POSITIVE 08/08/2020    WBCUA  08/08/2020    BACTERIA MANY 08/08/2020    RBCUA 10-20 08/08/2020    BLOODU SMALL 08/08/2020    SPECGRAV 1.018 08/08/2020    GLUCOSEU Negative 08/08/2020       Radiology:   Most recent    Chest CT      WITH CONTRAST:No results found for this or any previous visit. WITHOUT CONTRAST: No results found for this or any previous visit. CXR      2-view: No results found for this or any previous visit. Portable: No results found for this or any previous visit. Echo No results found for this or any previous visit. Assessment/Plan:    Active Hospital Problems    Diagnosis Date Noted    Thoracic compression fracture, closed, initial encounter ([facility] [S22.000A] 08/07/2020    Crush fracture of vertebra due to osteoporosis ([facility] [address] [M80.08XA] 08/07/2020    Paraparesis ([facility] [address] [G82.20] 08/07/2020    Acquired spondylolisthesis of thoracic spine [M43.14] 08/07/2020    Spinal stenosis, thoracic [M48.04] 08/07/2020     Thoracic compression fracture: Pain control with narcotics,  Neurosurgery consult in the ED with recommendation for surgical intervention    8/8 - planning for sx 8/10   8/9 - no significant change, poss sx 8/10.     HTN: Resume antihypertensive blood pressure monitoring     Pt can proceed with surgery with minimal surgical risk per  NSQIP calculator, patient has no  Pulmonary or cardiac compromise at this time.      DVT Prophylaxis:SCD for now, anticipating surgery  Diet: DIET GENERAL;  Code Status: Full Code      Electronically signed by Melvi Castle MD on 8/9/2020 at 3:36 PM

## 2020-08-09 NOTE — PROGRESS NOTES
Hospitalist Daily Progress Note  Name: Girish Karol  Age: 65 y.o. Gender: female  CodeStatus: Full Code  Allergies: Codeine    Chief Complaint:Other (pt sent from MRI for tx )      Primary Care Provider: LADONNA Westfall CNP    InpatientTreatment Team: Treatment Team: Attending Provider: Frida Gamez MD; Consulting Physician: Eliazar Summers MD; Surgeon: Eliazar Summers MD; Utilization Reviewer: Moriah Wyatt RN; Registered Nurse: Shane Lai RN; Patient Care Tech: Marilu Simpson    Admission Date: 8/7/2020      Subjective: No chest pain, sob, nausea. Resting comfortably in bed. Physical Exam  Vitals signs and nursing note reviewed. Constitutional:       Appearance: Normal appearance. Cardiovascular:      Rate and Rhythm: Normal rate and regular rhythm. Pulmonary:      Effort: Pulmonary effort is normal.      Breath sounds: Normal breath sounds. Abdominal:      General: Bowel sounds are normal.      Palpations: Abdomen is soft. Musculoskeletal: Normal range of motion. Skin:     General: Skin is warm and dry. Neurological:      Mental Status: She is alert and oriented to person, place, and time. Mental status is at baseline.          Medications:  Reviewed    Infusion Medications:   Scheduled Medications:    cefTRIAXone (ROCEPHIN) IV  1 g Intravenous Q24H    sodium chloride flush  10 mL Intravenous 2 times per day    atenolol  50 mg Oral Daily    calcium elemental  500 mg Oral Daily    [START ON 8/10/2020] ceFAZolin  2 g Intravenous Once     PRN Meds: tiZANidine, sodium chloride flush, acetaminophen **OR** acetaminophen, polyethylene glycol, promethazine **OR** ondansetron, HYDROmorphone, traMADol    Labs:   Recent Labs     08/07/20  1023 08/08/20  0556 08/09/20  0547   WBC 9.3 8.0 6.1   HGB 11.6* 11.3* 10.7*   HCT 33.6* 32.8* 30.8*    235 237     Recent Labs     08/07/20  1021 08/08/20  0556 08/09/20  0547    138 135   K 4.5 4.1 4.2    104 100   CO2 23 22 28 BUN 16 16 16   CREATININE 0.65 0.60 0.58   CALCIUM 10.2* 9.1 8.9     Recent Labs     08/07/20  1021   AST 22   ALT 27   BILITOT 0.5   ALKPHOS 53     Recent Labs     08/07/20  1021   INR 1.1     No results for input(s): [place] in the last 72 hours. Urinalysis:   Lab Results   Component Value Date    NITRU POSITIVE 08/08/2020    WBCUA  08/08/2020    BACTERIA MANY 08/08/2020    RBCUA 10-20 08/08/2020    BLOODU SMALL 08/08/2020    SPECGRAV 1.018 08/08/2020    GLUCOSEU Negative 08/08/2020       Radiology:   Most recent    Chest CT      WITH CONTRAST:No results found for this or any previous visit. WITHOUT CONTRAST: No results found for this or any previous visit. CXR      2-view: No results found for this or any previous visit. Portable: No results found for this or any previous visit. Echo No results found for this or any previous visit.           Assessment/Plan:    Active Hospital Problems    Diagnosis Date Noted    Thoracic compression fracture, closed, initial encounter ([facility] [S22.000A] 08/07/2020    Crush fracture of vertebra due to osteoporosis ([facility] [M80.08XA] 08/07/2020    Paraparesis ([address] [G82.20] 08/07/2020    Acquired spondylolisthesis of thoracic spine [M43.14] 08/07/2020    Spinal stenosis, thoracic [M48.04] 08/07/2020     Thoracic compression fracture: Pain control with narcotics,  Neurosurgery consult in the ED with recommendation for surgical intervention    8/8 - planning for sx 8/10    HTN: Resume antihypertensive blood pressure monitoring     Pt can proceed with surgery with minimal surgical risk per  NSQIP calculator, patient has no  Pulmonary or cardiac compromise at this time.      DVT Prophylaxis:SCD for now, anticipating surgery  Diet: DIET GENERAL;  Code Status: Full Code    Electronically signed by Lester Lama MD on 8/9/2020 at 3:35 PM

## 2020-08-10 ENCOUNTER — ANESTHESIA EVENT (OUTPATIENT)
Dept: OPERATING ROOM | Age: 70
DRG: 457 | End: 2020-08-10
Payer: MEDICARE

## 2020-08-10 ENCOUNTER — APPOINTMENT (OUTPATIENT)
Dept: GENERAL RADIOLOGY | Age: 70
DRG: 457 | End: 2020-08-10
Payer: MEDICARE

## 2020-08-10 ENCOUNTER — ANESTHESIA (OUTPATIENT)
Dept: OPERATING ROOM | Age: 70
DRG: 457 | End: 2020-08-10
Payer: MEDICARE

## 2020-08-10 VITALS — OXYGEN SATURATION: 99 % | SYSTOLIC BLOOD PRESSURE: 194 MMHG | TEMPERATURE: 98.6 F | DIASTOLIC BLOOD PRESSURE: 87 MMHG

## 2020-08-10 LAB
ANION GAP SERPL CALCULATED.3IONS-SCNC: 13 MEQ/L (ref 9–15)
BASOPHILS ABSOLUTE: 0 K/UL (ref 0–0.2)
BASOPHILS RELATIVE PERCENT: 0.5 %
BUN BLDV-MCNC: 16 MG/DL (ref 8–23)
CALCIUM SERPL-MCNC: 9.5 MG/DL (ref 8.5–9.9)
CHLORIDE BLD-SCNC: 100 MEQ/L (ref 95–107)
CO2: 22 MEQ/L (ref 20–31)
CREAT SERPL-MCNC: 0.57 MG/DL (ref 0.5–0.9)
EOSINOPHILS ABSOLUTE: 0.1 K/UL (ref 0–0.7)
EOSINOPHILS RELATIVE PERCENT: 1.5 %
GFR AFRICAN AMERICAN: >60
GFR AFRICAN AMERICAN: >60
GFR NON-AFRICAN AMERICAN: >60
GFR NON-AFRICAN AMERICAN: >60
GLUCOSE BLD-MCNC: 97 MG/DL (ref 70–99)
HCT VFR BLD CALC: 33.5 % (ref 37–47)
HEMOGLOBIN: 11.7 G/DL (ref 12–16)
LYMPHOCYTES ABSOLUTE: 1 K/UL (ref 1–4.8)
LYMPHOCYTES RELATIVE PERCENT: 15 %
MCH RBC QN AUTO: 34 PG (ref 27–31.3)
MCHC RBC AUTO-ENTMCNC: 34.9 % (ref 33–37)
MCV RBC AUTO: 97.3 FL (ref 82–100)
MONOCYTES ABSOLUTE: 0.6 K/UL (ref 0.2–0.8)
MONOCYTES RELATIVE PERCENT: 9.1 %
NEUTROPHILS ABSOLUTE: 4.7 K/UL (ref 1.4–6.5)
NEUTROPHILS RELATIVE PERCENT: 73.9 %
PDW BLD-RTO: 12.9 % (ref 11.5–14.5)
PERFORMED ON: NORMAL
PLATELET # BLD: 242 K/UL (ref 130–400)
POC CREATININE: 0.7 MG/DL (ref 0.6–1.2)
POC SAMPLE TYPE: NORMAL
POTASSIUM REFLEX MAGNESIUM: 3.7 MEQ/L (ref 3.4–4.9)
RBC # BLD: 3.44 M/UL (ref 4.2–5.4)
SODIUM BLD-SCNC: 135 MEQ/L (ref 135–144)
WBC # BLD: 6.4 K/UL (ref 4.8–10.8)

## 2020-08-10 PROCEDURE — 6360000002 HC RX W HCPCS: Performed by: INTERNAL MEDICINE

## 2020-08-10 PROCEDURE — 3600000004 HC SURGERY LEVEL 4 BASE: Performed by: NEUROLOGICAL SURGERY

## 2020-08-10 PROCEDURE — 36415 COLL VENOUS BLD VENIPUNCTURE: CPT

## 2020-08-10 PROCEDURE — 6360000002 HC RX W HCPCS: Performed by: NURSE ANESTHETIST, CERTIFIED REGISTERED

## 2020-08-10 PROCEDURE — 2500000003 HC RX 250 WO HCPCS: Performed by: ANESTHESIOLOGY

## 2020-08-10 PROCEDURE — 2709999900 HC NON-CHARGEABLE SUPPLY: Performed by: NEUROLOGICAL SURGERY

## 2020-08-10 PROCEDURE — 00NX0ZZ RELEASE THORACIC SPINAL CORD, OPEN APPROACH: ICD-10-PCS | Performed by: NEUROLOGICAL SURGERY

## 2020-08-10 PROCEDURE — 2580000003 HC RX 258: Performed by: NURSE ANESTHETIST, CERTIFIED REGISTERED

## 2020-08-10 PROCEDURE — 2720000010 HC SURG SUPPLY STERILE: Performed by: NEUROLOGICAL SURGERY

## 2020-08-10 PROCEDURE — 93010 ELECTROCARDIOGRAM REPORT: CPT | Performed by: INTERNAL MEDICINE

## 2020-08-10 PROCEDURE — 2580000003 HC RX 258: Performed by: ANESTHESIOLOGY

## 2020-08-10 PROCEDURE — 6360000002 HC RX W HCPCS: Performed by: ANESTHESIOLOGY

## 2020-08-10 PROCEDURE — 88307 TISSUE EXAM BY PATHOLOGIST: CPT

## 2020-08-10 PROCEDURE — 2780000010 HC IMPLANT OTHER: Performed by: NEUROLOGICAL SURGERY

## 2020-08-10 PROCEDURE — C1713 ANCHOR/SCREW BN/BN,TIS/BN: HCPCS | Performed by: NEUROLOGICAL SURGERY

## 2020-08-10 PROCEDURE — 6370000000 HC RX 637 (ALT 250 FOR IP): Performed by: NEUROLOGICAL SURGERY

## 2020-08-10 PROCEDURE — 3209999900 FLUORO FOR SURGICAL PROCEDURES

## 2020-08-10 PROCEDURE — 3700000000 HC ANESTHESIA ATTENDED CARE: Performed by: NEUROLOGICAL SURGERY

## 2020-08-10 PROCEDURE — 6360000002 HC RX W HCPCS: Performed by: NURSE PRACTITIONER

## 2020-08-10 PROCEDURE — 72072 X-RAY EXAM THORAC SPINE 3VWS: CPT

## 2020-08-10 PROCEDURE — 7100000001 HC PACU RECOVERY - ADDTL 15 MIN: Performed by: NEUROLOGICAL SURGERY

## 2020-08-10 PROCEDURE — 6370000000 HC RX 637 (ALT 250 FOR IP): Performed by: INTERNAL MEDICINE

## 2020-08-10 PROCEDURE — 0PB40ZX EXCISION OF THORACIC VERTEBRA, OPEN APPROACH, DIAGNOSTIC: ICD-10-PCS | Performed by: NEUROLOGICAL SURGERY

## 2020-08-10 PROCEDURE — 2580000003 HC RX 258: Performed by: NEUROLOGICAL SURGERY

## 2020-08-10 PROCEDURE — 2500000003 HC RX 250 WO HCPCS: Performed by: NEUROLOGICAL SURGERY

## 2020-08-10 PROCEDURE — 3700000001 HC ADD 15 MINUTES (ANESTHESIA): Performed by: NEUROLOGICAL SURGERY

## 2020-08-10 PROCEDURE — 1210000000 HC MED SURG R&B

## 2020-08-10 PROCEDURE — 2500000003 HC RX 250 WO HCPCS: Performed by: NURSE ANESTHETIST, CERTIFIED REGISTERED

## 2020-08-10 PROCEDURE — 7100000000 HC PACU RECOVERY - FIRST 15 MIN: Performed by: NEUROLOGICAL SURGERY

## 2020-08-10 PROCEDURE — 85025 COMPLETE CBC W/AUTO DIFF WBC: CPT

## 2020-08-10 PROCEDURE — 76942 ECHO GUIDE FOR BIOPSY: CPT | Performed by: ANESTHESIOLOGY

## 2020-08-10 PROCEDURE — 2580000003 HC RX 258: Performed by: INTERNAL MEDICINE

## 2020-08-10 PROCEDURE — 0RG70K1 FUSION OF 2 TO 7 THORACIC VERTEBRAL JOINTS WITH NONAUTOLOGOUS TISSUE SUBSTITUTE, POSTERIOR APPROACH, POSTERIOR COLUMN, OPEN APPROACH: ICD-10-PCS | Performed by: NEUROLOGICAL SURGERY

## 2020-08-10 PROCEDURE — 88311 DECALCIFY TISSUE: CPT

## 2020-08-10 PROCEDURE — 3600000014 HC SURGERY LEVEL 4 ADDTL 15MIN: Performed by: NEUROLOGICAL SURGERY

## 2020-08-10 PROCEDURE — 88341 IMHCHEM/IMCYTCHM EA ADD ANTB: CPT

## 2020-08-10 PROCEDURE — 88342 IMHCHEM/IMCYTCHM 1ST ANTB: CPT

## 2020-08-10 PROCEDURE — 2580000003 HC RX 258: Performed by: NURSE PRACTITIONER

## 2020-08-10 PROCEDURE — 6360000002 HC RX W HCPCS: Performed by: NEUROLOGICAL SURGERY

## 2020-08-10 PROCEDURE — 6370000000 HC RX 637 (ALT 250 FOR IP): Performed by: NURSE PRACTITIONER

## 2020-08-10 PROCEDURE — 80048 BASIC METABOLIC PNL TOTAL CA: CPT

## 2020-08-10 DEVICE — SCREW SPNL DIA5.5MM OPN TULIP LOK RELINE: Type: IMPLANTABLE DEVICE | Site: SPINE THORACIC | Status: FUNCTIONAL

## 2020-08-10 DEVICE — IMPLANTABLE DEVICE: Type: IMPLANTABLE DEVICE | Site: SPINE THORACIC | Status: FUNCTIONAL

## 2020-08-10 DEVICE — SCREW SPNL L40MM DIA5.5MM POST THORACOLUMBOSACRAL POLYAX 2S: Type: IMPLANTABLE DEVICE | Site: SPINE THORACIC | Status: FUNCTIONAL

## 2020-08-10 DEVICE — AGENT HEMOSTATIC SURGIFLOW MATRIX KIT W/THROMBIN: Type: IMPLANTABLE DEVICE | Site: SPINE THORACIC | Status: FUNCTIONAL

## 2020-08-10 DEVICE — ATTRAX® SCAFFOLD STRIPS, LARGE
Type: IMPLANTABLE DEVICE | Site: SPINE THORACIC | Status: FUNCTIONAL
Brand: ATTRAX

## 2020-08-10 RX ORDER — DEXAMETHASONE SODIUM PHOSPHATE 4 MG/ML
INJECTION, SOLUTION INTRA-ARTICULAR; INTRALESIONAL; INTRAMUSCULAR; INTRAVENOUS; SOFT TISSUE PRN
Status: DISCONTINUED | OUTPATIENT
Start: 2020-08-10 | End: 2020-08-10 | Stop reason: SDUPTHER

## 2020-08-10 RX ORDER — DIPHENHYDRAMINE HYDROCHLORIDE 50 MG/ML
12.5 INJECTION INTRAMUSCULAR; INTRAVENOUS
Status: DISCONTINUED | OUTPATIENT
Start: 2020-08-10 | End: 2020-08-10 | Stop reason: HOSPADM

## 2020-08-10 RX ORDER — SODIUM CHLORIDE 0.9 % (FLUSH) 0.9 %
10 SYRINGE (ML) INJECTION PRN
Status: DISCONTINUED | OUTPATIENT
Start: 2020-08-10 | End: 2020-08-16 | Stop reason: HOSPADM

## 2020-08-10 RX ORDER — FENTANYL CITRATE 50 UG/ML
25 INJECTION, SOLUTION INTRAMUSCULAR; INTRAVENOUS EVERY 10 MIN PRN
Status: COMPLETED | OUTPATIENT
Start: 2020-08-10 | End: 2020-08-10

## 2020-08-10 RX ORDER — CEFAZOLIN SODIUM 2 G/50ML
2 SOLUTION INTRAVENOUS ONCE
Status: COMPLETED | OUTPATIENT
Start: 2020-08-10 | End: 2020-08-10

## 2020-08-10 RX ORDER — MEPERIDINE HYDROCHLORIDE 25 MG/ML
12.5 INJECTION INTRAMUSCULAR; INTRAVENOUS; SUBCUTANEOUS EVERY 5 MIN PRN
Status: DISCONTINUED | OUTPATIENT
Start: 2020-08-10 | End: 2020-08-10 | Stop reason: HOSPADM

## 2020-08-10 RX ORDER — OXYCODONE HYDROCHLORIDE 5 MG/1
5 TABLET ORAL EVERY 4 HOURS PRN
Status: DISCONTINUED | OUTPATIENT
Start: 2020-08-10 | End: 2020-08-16 | Stop reason: HOSPADM

## 2020-08-10 RX ORDER — ACETAMINOPHEN 325 MG/1
650 TABLET ORAL EVERY 4 HOURS PRN
Status: DISCONTINUED | OUTPATIENT
Start: 2020-08-10 | End: 2020-08-16 | Stop reason: HOSPADM

## 2020-08-10 RX ORDER — BUPIVACAINE HYDROCHLORIDE AND EPINEPHRINE 5; 5 MG/ML; UG/ML
INJECTION, SOLUTION EPIDURAL; INTRACAUDAL; PERINEURAL PRN
Status: DISCONTINUED | OUTPATIENT
Start: 2020-08-10 | End: 2020-08-10 | Stop reason: ALTCHOICE

## 2020-08-10 RX ORDER — MIDAZOLAM HYDROCHLORIDE 1 MG/ML
INJECTION INTRAMUSCULAR; INTRAVENOUS PRN
Status: DISCONTINUED | OUTPATIENT
Start: 2020-08-10 | End: 2020-08-10 | Stop reason: SDUPTHER

## 2020-08-10 RX ORDER — LIDOCAINE HYDROCHLORIDE AND EPINEPHRINE 10; 10 MG/ML; UG/ML
INJECTION, SOLUTION INFILTRATION; PERINEURAL PRN
Status: DISCONTINUED | OUTPATIENT
Start: 2020-08-10 | End: 2020-08-10 | Stop reason: SDUPTHER

## 2020-08-10 RX ORDER — SODIUM CHLORIDE, SODIUM LACTATE, POTASSIUM CHLORIDE, CALCIUM CHLORIDE 600; 310; 30; 20 MG/100ML; MG/100ML; MG/100ML; MG/100ML
INJECTION, SOLUTION INTRAVENOUS CONTINUOUS PRN
Status: DISCONTINUED | OUTPATIENT
Start: 2020-08-10 | End: 2020-08-10 | Stop reason: SDUPTHER

## 2020-08-10 RX ORDER — MAGNESIUM HYDROXIDE 1200 MG/15ML
LIQUID ORAL CONTINUOUS PRN
Status: COMPLETED | OUTPATIENT
Start: 2020-08-10 | End: 2020-08-10

## 2020-08-10 RX ORDER — SODIUM CHLORIDE 0.9 % (FLUSH) 0.9 %
10 SYRINGE (ML) INJECTION EVERY 12 HOURS SCHEDULED
Status: DISCONTINUED | OUTPATIENT
Start: 2020-08-10 | End: 2020-08-16 | Stop reason: HOSPADM

## 2020-08-10 RX ORDER — ONDANSETRON 2 MG/ML
4 INJECTION INTRAMUSCULAR; INTRAVENOUS
Status: DISCONTINUED | OUTPATIENT
Start: 2020-08-10 | End: 2020-08-10 | Stop reason: HOSPADM

## 2020-08-10 RX ORDER — CALCIUM CARBONATE 200(500)MG
500 TABLET,CHEWABLE ORAL 3 TIMES DAILY PRN
Status: DISCONTINUED | OUTPATIENT
Start: 2020-08-10 | End: 2020-08-16 | Stop reason: HOSPADM

## 2020-08-10 RX ORDER — SODIUM CHLORIDE 9 MG/ML
INJECTION, SOLUTION INTRAVENOUS CONTINUOUS
Status: DISCONTINUED | OUTPATIENT
Start: 2020-08-10 | End: 2020-08-16 | Stop reason: HOSPADM

## 2020-08-10 RX ORDER — ONDANSETRON 4 MG/1
4 TABLET, ORALLY DISINTEGRATING ORAL EVERY 8 HOURS PRN
Status: DISCONTINUED | OUTPATIENT
Start: 2020-08-10 | End: 2020-08-16 | Stop reason: HOSPADM

## 2020-08-10 RX ORDER — ONDANSETRON 2 MG/ML
INJECTION INTRAMUSCULAR; INTRAVENOUS PRN
Status: DISCONTINUED | OUTPATIENT
Start: 2020-08-10 | End: 2020-08-10 | Stop reason: SDUPTHER

## 2020-08-10 RX ORDER — PROPOFOL 10 MG/ML
INJECTION, EMULSION INTRAVENOUS PRN
Status: DISCONTINUED | OUTPATIENT
Start: 2020-08-10 | End: 2020-08-10 | Stop reason: SDUPTHER

## 2020-08-10 RX ORDER — LIDOCAINE HYDROCHLORIDE 20 MG/ML
INJECTION, SOLUTION INTRAVENOUS PRN
Status: DISCONTINUED | OUTPATIENT
Start: 2020-08-10 | End: 2020-08-10 | Stop reason: SDUPTHER

## 2020-08-10 RX ORDER — SODIUM CHLORIDE, SODIUM LACTATE, POTASSIUM CHLORIDE, CALCIUM CHLORIDE 600; 310; 30; 20 MG/100ML; MG/100ML; MG/100ML; MG/100ML
INJECTION, SOLUTION INTRAVENOUS CONTINUOUS
Status: DISCONTINUED | OUTPATIENT
Start: 2020-08-10 | End: 2020-08-10

## 2020-08-10 RX ORDER — ROPIVACAINE HYDROCHLORIDE 5 MG/ML
INJECTION, SOLUTION EPIDURAL; INFILTRATION; PERINEURAL PRN
Status: DISCONTINUED | OUTPATIENT
Start: 2020-08-10 | End: 2020-08-10 | Stop reason: SDUPTHER

## 2020-08-10 RX ORDER — ROCURONIUM BROMIDE 10 MG/ML
INJECTION, SOLUTION INTRAVENOUS PRN
Status: DISCONTINUED | OUTPATIENT
Start: 2020-08-10 | End: 2020-08-10 | Stop reason: SDUPTHER

## 2020-08-10 RX ORDER — CEFAZOLIN SODIUM 2 G/50ML
2 SOLUTION INTRAVENOUS EVERY 8 HOURS
Status: COMPLETED | OUTPATIENT
Start: 2020-08-11 | End: 2020-08-11

## 2020-08-10 RX ORDER — METOCLOPRAMIDE HYDROCHLORIDE 5 MG/ML
10 INJECTION INTRAMUSCULAR; INTRAVENOUS
Status: DISCONTINUED | OUTPATIENT
Start: 2020-08-10 | End: 2020-08-10 | Stop reason: HOSPADM

## 2020-08-10 RX ORDER — ONDANSETRON 2 MG/ML
4 INJECTION INTRAMUSCULAR; INTRAVENOUS EVERY 6 HOURS PRN
Status: DISCONTINUED | OUTPATIENT
Start: 2020-08-10 | End: 2020-08-16 | Stop reason: HOSPADM

## 2020-08-10 RX ORDER — DEXAMETHASONE SODIUM PHOSPHATE 10 MG/ML
INJECTION INTRAMUSCULAR; INTRAVENOUS PRN
Status: DISCONTINUED | OUTPATIENT
Start: 2020-08-10 | End: 2020-08-10 | Stop reason: SDUPTHER

## 2020-08-10 RX ADMIN — ROPIVACAINE HYDROCHLORIDE 25 ML: 5 INJECTION, SOLUTION EPIDURAL; INFILTRATION; PERINEURAL at 13:46

## 2020-08-10 RX ADMIN — TIZANIDINE 4 MG: 4 TABLET ORAL at 23:46

## 2020-08-10 RX ADMIN — SODIUM CHLORIDE, POTASSIUM CHLORIDE, SODIUM LACTATE AND CALCIUM CHLORIDE: 600; 310; 30; 20 INJECTION, SOLUTION INTRAVENOUS at 14:14

## 2020-08-10 RX ADMIN — DEXAMETHASONE SODIUM PHOSPHATE 10 MG: 10 INJECTION INTRAMUSCULAR; INTRAVENOUS at 16:22

## 2020-08-10 RX ADMIN — CEFAZOLIN SODIUM 2 G: 2 SOLUTION INTRAVENOUS at 00:29

## 2020-08-10 RX ADMIN — HYDROMORPHONE HYDROCHLORIDE 0.5 MG: 1 INJECTION, SOLUTION INTRAMUSCULAR; INTRAVENOUS; SUBCUTANEOUS at 11:34

## 2020-08-10 RX ADMIN — PHENYLEPHRINE HYDROCHLORIDE 100 MCG: 10 INJECTION INTRAVENOUS at 20:18

## 2020-08-10 RX ADMIN — FENTANYL CITRATE 25 MCG: 50 INJECTION, SOLUTION INTRAMUSCULAR; INTRAVENOUS at 21:23

## 2020-08-10 RX ADMIN — TIZANIDINE 4 MG: 4 TABLET ORAL at 11:34

## 2020-08-10 RX ADMIN — TRAMADOL HYDROCHLORIDE 50 MG: 50 TABLET, FILM COATED ORAL at 05:18

## 2020-08-10 RX ADMIN — LIDOCAINE HYDROCHLORIDE 40 MG: 20 INJECTION, SOLUTION INTRAVENOUS at 15:56

## 2020-08-10 RX ADMIN — CEFTRIAXONE SODIUM 1 G: 1 INJECTION, POWDER, FOR SOLUTION INTRAMUSCULAR; INTRAVENOUS at 23:30

## 2020-08-10 RX ADMIN — PHENYLEPHRINE HYDROCHLORIDE 100 MCG: 10 INJECTION INTRAVENOUS at 16:19

## 2020-08-10 RX ADMIN — SODIUM CHLORIDE, POTASSIUM CHLORIDE, SODIUM LACTATE AND CALCIUM CHLORIDE: 600; 310; 30; 20 INJECTION, SOLUTION INTRAVENOUS at 15:48

## 2020-08-10 RX ADMIN — SODIUM CHLORIDE, POTASSIUM CHLORIDE, SODIUM LACTATE AND CALCIUM CHLORIDE: 600; 310; 30; 20 INJECTION, SOLUTION INTRAVENOUS at 17:54

## 2020-08-10 RX ADMIN — FENTANYL CITRATE 25 MCG: 50 INJECTION, SOLUTION INTRAMUSCULAR; INTRAVENOUS at 21:34

## 2020-08-10 RX ADMIN — SODIUM CHLORIDE, POTASSIUM CHLORIDE, SODIUM LACTATE AND CALCIUM CHLORIDE: 600; 310; 30; 20 INJECTION, SOLUTION INTRAVENOUS at 16:04

## 2020-08-10 RX ADMIN — ROCURONIUM BROMIDE 10 MG: 10 INJECTION INTRAVENOUS at 17:54

## 2020-08-10 RX ADMIN — SODIUM CHLORIDE, POTASSIUM CHLORIDE, SODIUM LACTATE AND CALCIUM CHLORIDE: 600; 310; 30; 20 INJECTION, SOLUTION INTRAVENOUS at 18:17

## 2020-08-10 RX ADMIN — FENTANYL CITRATE 25 MCG: 50 INJECTION, SOLUTION INTRAMUSCULAR; INTRAVENOUS at 21:13

## 2020-08-10 RX ADMIN — SODIUM CHLORIDE, POTASSIUM CHLORIDE, SODIUM LACTATE AND CALCIUM CHLORIDE: 600; 310; 30; 20 INJECTION, SOLUTION INTRAVENOUS at 13:29

## 2020-08-10 RX ADMIN — ROCURONIUM BROMIDE 50 MG: 10 INJECTION INTRAVENOUS at 15:56

## 2020-08-10 RX ADMIN — TRAMADOL HYDROCHLORIDE 50 MG: 50 TABLET, FILM COATED ORAL at 23:46

## 2020-08-10 RX ADMIN — CEFAZOLIN SODIUM 2 G: 2 SOLUTION INTRAVENOUS at 20:02

## 2020-08-10 RX ADMIN — HYDROMORPHONE HYDROCHLORIDE 0.5 MG: 1 INJECTION, SOLUTION INTRAMUSCULAR; INTRAVENOUS; SUBCUTANEOUS at 06:53

## 2020-08-10 RX ADMIN — ANTACID TABLETS 500 MG: 500 TABLET, CHEWABLE ORAL at 23:29

## 2020-08-10 RX ADMIN — CEFAZOLIN SODIUM 2 G: 2 SOLUTION INTRAVENOUS at 16:02

## 2020-08-10 RX ADMIN — ROCURONIUM BROMIDE 20 MG: 10 INJECTION INTRAVENOUS at 17:17

## 2020-08-10 RX ADMIN — PHENYLEPHRINE HYDROCHLORIDE 100 MCG: 10 INJECTION INTRAVENOUS at 16:25

## 2020-08-10 RX ADMIN — ROCURONIUM BROMIDE 10 MG: 10 INJECTION INTRAVENOUS at 19:12

## 2020-08-10 RX ADMIN — ONDANSETRON 4 MG: 2 INJECTION INTRAMUSCULAR; INTRAVENOUS at 19:50

## 2020-08-10 RX ADMIN — PROPOFOL 150 MG: 10 INJECTION, EMULSION INTRAVENOUS at 15:56

## 2020-08-10 RX ADMIN — TIZANIDINE 4 MG: 4 TABLET ORAL at 05:18

## 2020-08-10 RX ADMIN — SUGAMMADEX 200 MG: 100 INJECTION, SOLUTION INTRAVENOUS at 20:34

## 2020-08-10 RX ADMIN — LIDOCAINE HYDROCHLORIDE AND EPINEPHRINE 25 ML: 10; 10 INJECTION, SOLUTION INFILTRATION; PERINEURAL at 13:46

## 2020-08-10 RX ADMIN — DEXAMETHASONE SODIUM PHOSPHATE 4 MG: 4 INJECTION INTRA-ARTICULAR; INTRALESIONAL; INTRAMUSCULAR; INTRAVENOUS; SOFT TISSUE at 13:46

## 2020-08-10 RX ADMIN — CEFTRIAXONE SODIUM 1 G: 1 INJECTION, POWDER, FOR SOLUTION INTRAMUSCULAR; INTRAVENOUS at 01:10

## 2020-08-10 RX ADMIN — SODIUM CHLORIDE: 9 INJECTION, SOLUTION INTRAVENOUS at 21:34

## 2020-08-10 RX ADMIN — ROCURONIUM BROMIDE 10 MG: 10 INJECTION INTRAVENOUS at 16:28

## 2020-08-10 RX ADMIN — FENTANYL CITRATE 25 MCG: 50 INJECTION, SOLUTION INTRAMUSCULAR; INTRAVENOUS at 21:03

## 2020-08-10 RX ADMIN — MIDAZOLAM HYDROCHLORIDE 2 MG: 2 INJECTION, SOLUTION INTRAMUSCULAR; INTRAVENOUS at 13:40

## 2020-08-10 RX ADMIN — PHENYLEPHRINE HYDROCHLORIDE 100 MCG: 10 INJECTION INTRAVENOUS at 16:34

## 2020-08-10 RX ADMIN — HYDROMORPHONE HYDROCHLORIDE 0.5 MG: 1 INJECTION, SOLUTION INTRAMUSCULAR; INTRAVENOUS; SUBCUTANEOUS at 00:29

## 2020-08-10 RX ADMIN — Medication 10 ML: at 23:35

## 2020-08-10 RX ADMIN — ROCURONIUM BROMIDE 10 MG: 10 INJECTION INTRAVENOUS at 18:30

## 2020-08-10 RX ADMIN — Medication 10 ML: at 07:39

## 2020-08-10 ASSESSMENT — PULMONARY FUNCTION TESTS
PIF_VALUE: 18
PIF_VALUE: 17
PIF_VALUE: 19
PIF_VALUE: 17
PIF_VALUE: 18
PIF_VALUE: 17
PIF_VALUE: 18
PIF_VALUE: 17
PIF_VALUE: 17
PIF_VALUE: 21
PIF_VALUE: 18
PIF_VALUE: 18
PIF_VALUE: 17
PIF_VALUE: 18
PIF_VALUE: 18
PIF_VALUE: 17
PIF_VALUE: 18
PIF_VALUE: 23
PIF_VALUE: 17
PIF_VALUE: 18
PIF_VALUE: 18
PIF_VALUE: 17
PIF_VALUE: 18
PIF_VALUE: 17
PIF_VALUE: 18
PIF_VALUE: 18
PIF_VALUE: 17
PIF_VALUE: 17
PIF_VALUE: 18
PIF_VALUE: 21
PIF_VALUE: 17
PIF_VALUE: 5
PIF_VALUE: 23
PIF_VALUE: 18
PIF_VALUE: 17
PIF_VALUE: 18
PIF_VALUE: 20
PIF_VALUE: 18
PIF_VALUE: 17
PIF_VALUE: 18
PIF_VALUE: 18
PIF_VALUE: 17
PIF_VALUE: 18
PIF_VALUE: 17
PIF_VALUE: 18
PIF_VALUE: 16
PIF_VALUE: 18
PIF_VALUE: 17
PIF_VALUE: 21
PIF_VALUE: 18
PIF_VALUE: 15
PIF_VALUE: 18
PIF_VALUE: 17
PIF_VALUE: 18
PIF_VALUE: 18
PIF_VALUE: 17
PIF_VALUE: 17
PIF_VALUE: 7
PIF_VALUE: 17
PIF_VALUE: 18
PIF_VALUE: 21
PIF_VALUE: 17
PIF_VALUE: 3
PIF_VALUE: 17
PIF_VALUE: 18
PIF_VALUE: 17
PIF_VALUE: 18
PIF_VALUE: 17
PIF_VALUE: 18
PIF_VALUE: 17
PIF_VALUE: 18
PIF_VALUE: 18
PIF_VALUE: 17
PIF_VALUE: 17
PIF_VALUE: 21
PIF_VALUE: 12
PIF_VALUE: 17
PIF_VALUE: 18
PIF_VALUE: 20
PIF_VALUE: 19
PIF_VALUE: 18
PIF_VALUE: 18
PIF_VALUE: 17
PIF_VALUE: 19
PIF_VALUE: 21
PIF_VALUE: 18
PIF_VALUE: 17
PIF_VALUE: 17
PIF_VALUE: 18
PIF_VALUE: 18
PIF_VALUE: 17
PIF_VALUE: 18
PIF_VALUE: 17
PIF_VALUE: 17
PIF_VALUE: 18
PIF_VALUE: 17
PIF_VALUE: 18
PIF_VALUE: 17
PIF_VALUE: 21
PIF_VALUE: 17
PIF_VALUE: 18
PIF_VALUE: 20
PIF_VALUE: 18
PIF_VALUE: 17
PIF_VALUE: 21
PIF_VALUE: 18
PIF_VALUE: 17
PIF_VALUE: 18
PIF_VALUE: 18
PIF_VALUE: 17
PIF_VALUE: 18
PIF_VALUE: 17
PIF_VALUE: 18
PIF_VALUE: 21
PIF_VALUE: 17
PIF_VALUE: 18
PIF_VALUE: 18
PIF_VALUE: 17
PIF_VALUE: 22
PIF_VALUE: 18
PIF_VALUE: 18
PIF_VALUE: 17
PIF_VALUE: 18
PIF_VALUE: 17
PIF_VALUE: 21
PIF_VALUE: 18
PIF_VALUE: 18
PIF_VALUE: 17
PIF_VALUE: 17
PIF_VALUE: 21
PIF_VALUE: 18
PIF_VALUE: 21
PIF_VALUE: 18
PIF_VALUE: 18
PIF_VALUE: 17
PIF_VALUE: 18
PIF_VALUE: 18
PIF_VALUE: 17
PIF_VALUE: 17
PIF_VALUE: 18
PIF_VALUE: 17
PIF_VALUE: 18
PIF_VALUE: 21
PIF_VALUE: 17
PIF_VALUE: 18
PIF_VALUE: 17
PIF_VALUE: 18
PIF_VALUE: 17
PIF_VALUE: 17
PIF_VALUE: 22
PIF_VALUE: 2
PIF_VALUE: 17
PIF_VALUE: 18
PIF_VALUE: 18
PIF_VALUE: 17
PIF_VALUE: 17
PIF_VALUE: 18
PIF_VALUE: 18
PIF_VALUE: 17
PIF_VALUE: 18
PIF_VALUE: 17
PIF_VALUE: 18
PIF_VALUE: 21
PIF_VALUE: 18
PIF_VALUE: 18
PIF_VALUE: 5
PIF_VALUE: 17
PIF_VALUE: 18
PIF_VALUE: 17
PIF_VALUE: 17
PIF_VALUE: 18
PIF_VALUE: 22
PIF_VALUE: 17
PIF_VALUE: 17
PIF_VALUE: 18
PIF_VALUE: 18
PIF_VALUE: 17
PIF_VALUE: 31
PIF_VALUE: 17
PIF_VALUE: 10
PIF_VALUE: 17
PIF_VALUE: 18
PIF_VALUE: 21
PIF_VALUE: 18
PIF_VALUE: 18
PIF_VALUE: 17
PIF_VALUE: 18
PIF_VALUE: 18
PIF_VALUE: 21
PIF_VALUE: 17
PIF_VALUE: 18
PIF_VALUE: 18
PIF_VALUE: 17
PIF_VALUE: 21
PIF_VALUE: 17
PIF_VALUE: 18
PIF_VALUE: 17
PIF_VALUE: 17
PIF_VALUE: 18
PIF_VALUE: 17
PIF_VALUE: 18
PIF_VALUE: 10
PIF_VALUE: 22
PIF_VALUE: 18
PIF_VALUE: 21
PIF_VALUE: 17
PIF_VALUE: 18
PIF_VALUE: 17
PIF_VALUE: 21
PIF_VALUE: 18
PIF_VALUE: 17
PIF_VALUE: 18
PIF_VALUE: 17
PIF_VALUE: 18
PIF_VALUE: 18
PIF_VALUE: 17
PIF_VALUE: 18
PIF_VALUE: 18
PIF_VALUE: 20
PIF_VALUE: 18
PIF_VALUE: 18
PIF_VALUE: 17
PIF_VALUE: 18
PIF_VALUE: 17
PIF_VALUE: 17
PIF_VALUE: 18
PIF_VALUE: 21
PIF_VALUE: 17
PIF_VALUE: 18
PIF_VALUE: 17
PIF_VALUE: 18
PIF_VALUE: 17
PIF_VALUE: 18
PIF_VALUE: 17
PIF_VALUE: 17
PIF_VALUE: 21
PIF_VALUE: 17

## 2020-08-10 ASSESSMENT — PAIN SCALES - GENERAL
PAINLEVEL_OUTOF10: 5
PAINLEVEL_OUTOF10: 3
PAINLEVEL_OUTOF10: 6
PAINLEVEL_OUTOF10: 3
PAINLEVEL_OUTOF10: 0
PAINLEVEL_OUTOF10: 6
PAINLEVEL_OUTOF10: 4
PAINLEVEL_OUTOF10: 5
PAINLEVEL_OUTOF10: 4
PAINLEVEL_OUTOF10: 4
PAINLEVEL_OUTOF10: 9
PAINLEVEL_OUTOF10: 5
PAINLEVEL_OUTOF10: 0
PAINLEVEL_OUTOF10: 5
PAINLEVEL_OUTOF10: 6

## 2020-08-10 ASSESSMENT — PAIN DESCRIPTION - PAIN TYPE: TYPE: SURGICAL PAIN

## 2020-08-10 ASSESSMENT — PAIN DESCRIPTION - LOCATION: LOCATION: BACK

## 2020-08-10 ASSESSMENT — PAIN DESCRIPTION - ORIENTATION: ORIENTATION: MID

## 2020-08-10 ASSESSMENT — PAIN - FUNCTIONAL ASSESSMENT: PAIN_FUNCTIONAL_ASSESSMENT: 0-10

## 2020-08-10 NOTE — PROGRESS NOTES
Pt taken for block wit Dr. Banegas Se. Pt placed on Oxygen and monitor. Pt given Versed prior to Procedure. Time out performed. Pt tolerated procedure well.

## 2020-08-10 NOTE — ANESTHESIA PRE PROCEDURE
Department of Anesthesiology  Preprocedure Note       Name:  Kalani Womack   Age:  65 y.o.  :  1950                                          MRN:  [id number]         Date:  8/10/2020      Surgeon: Sharon Buckley):  Agueda Houser MD    Procedure: Procedure(s):  T7-8-9  DECOMPRESSION POSTEROLATERAL FUSION PEDICLE SCREWS T6-7-10-11 Lenon Dionicio  I. G.S REP NEEDED 2 C-ARMS, PULIDO CATH 4 HRS , ROOM 295    Medications prior to admission:   Prior to Admission medications    Medication Sig Start Date End Date Taking? Authorizing Provider   calcium carbonate (OSCAL) 500 MG TABS tablet Take 500 mg by mouth daily   Yes Historical Provider, MD   predniSONE (DELTASONE) 20 MG tablet Take 1 tablet by mouth 2 times daily for 5 days 20 Yes LADONNA Davis CNP   tiZANidine (ZANAFLEX) 2 MG tablet Take 1 tablet by mouth 3 times daily as needed (muscle spasms) 20  Yes LADONNA Davis CNP   naproxen (NAPROSYN) 500 MG tablet Take 1 tablet by mouth 2 times daily (with meals) 20  Yes LADONNA Davis CNP   atenolol (TENORMIN) 50 MG tablet Take 1 tablet by mouth daily 20  Yes LADONNA Davis CNP   alendronate (FOSAMAX) 70 MG tablet Take 1 tablet by mouth every 7 days 20  Yes LADONNA Davis CNP   metroNIDAZOLE (METROGEL) 0.75 % gel Apply topically 2 times daily.  19   LADONNA Davis CNP   acetaminophen (TYLENOL) 650 MG CR tablet Take 650 mg by mouth every 8 hours as needed for Pain    Historical Provider, MD       Current medications:    Current Facility-Administered Medications   Medication Dose Route Frequency Provider Last Rate Last Dose    lactated ringers infusion   Intravenous Continuous Nathalia Lindquist  mL/hr at 08/10/20 1329      ceFAZolin (ANCEF) 2 g in dextrose 3 % 50 mL IVPB (duplex)  2 g Intravenous Once Nathalia Lindquist MD        tiZANidine (ZANAFLEX) tablet 4 mg  4 mg Oral Q6H PRN Agueda Houser MD   4 mg at 08/10/20 0124    cefTRIAXone (ROCEPHIN) 1 g IVPB in 50 mL D5W minibag  1 g Intravenous Q24H Yomi Love Jadielar,    Stopped at 08/10/20 0140    sodium chloride flush 0.9 % injection 10 mL  10 mL Intravenous 2 times per day Vermell Ogles, APRN - CNP   10 mL at 08/10/20 0739    sodium chloride flush 0.9 % injection 10 mL  10 mL Intravenous PRN Vermell Ogles, APRN - CNP   10 mL at 08/09/20 0232    acetaminophen (TYLENOL) tablet 650 mg  650 mg Oral Q6H PRN Vermell Ogles, APRN - CNP        Or    acetaminophen (TYLENOL) suppository 650 mg  650 mg Rectal Q6H PRN Vermell Ogles, APRN - CNP        polyethylene glycol (GLYCOLAX) packet 17 g  17 g Oral Daily PRN Vermell Ogles, APRN - CNP        promethazine (PHENERGAN) tablet 12.5 mg  12.5 mg Oral Q6H PRN Vermell Ogles, APRN - CNP        Or    ondansetron (ZOFRAN) injection 4 mg  4 mg Intravenous Q6H PRN Vermell Ogles, APRN - CNP   4 mg at 08/08/20 1716    HYDROmorphone (DILAUDID) injection 0.5 mg  0.5 mg Intravenous Q4H PRN Vermell Ogles, APRN - CNP   0.5 mg at 08/10/20 1134    atenolol (TENORMIN) tablet 50 mg  50 mg Oral Daily Ashlynell Ogles, APRN - CNP   Stopped at 08/10/20 0735    calcium elemental (OSCAL) tablet 500 mg  500 mg Oral Daily Ashlynell Ogles, APRN - CNP   Stopped at 08/10/20 0735    traMADol (ULTRAM) tablet 50 mg  50 mg Oral Q6H PRN Ashlynell Ogles, APRN - CNP   50 mg at 08/10/20 0518       Allergies:     Allergies   Allergen Reactions    Codeine Rash       Problem List:    Patient Active Problem List   Diagnosis Code    Hypertension I10    Osteopenia M85.80    Anxiety F41.9    Dermatitis L30.9    Acne rosacea L71.9    Actinic dermatitis L57.8    Thoracic compression fracture[facility] closed[facility] S22.000A    Crush fracture of vertebra due to osteoporosis ([facility] M80.08XA    Paraparesis ([address] G82.20    Acquired spondylolisthesis of thoracic spine M43.14    Spinal stenosis, thoracic M48.04    Myelopathy of thoracic region M47.14       Past Medical History:        Diagnosis Date    Acne rosacea     Actinic dermatitis     biopsy proven 1/2018    Anxiety     Dermatitis     Hypertension     Osteopenia        Past Surgical History:        Procedure Laterality Date    CATARACT REMOVAL      bilateral       Social History:    Social History     Tobacco Use    Smoking status: Never Smoker    Smokeless tobacco: Never Used   Substance Use Topics    Alcohol use: No     Alcohol/week: 0.0 standard drinks                                Counseling given: Not Answered      Vital Signs (Current):   Vitals:    08/09/20 0715 08/09/20 2200 08/10/20 0730 08/10/20 1309   BP: (!) 112/58 (!) 153/54 (!) 160/68 (!) 175/78   Pulse: (!) 48 51 55 60   Resp:  18 16 18   Temp: 98.1 °F (36.7 °C) 98.4 °F (36.9 °C) 98.4 °F (36.9 °C) 99.5 °F (37.5 °C)   TempSrc:  Oral Oral    SpO2: 95% 98% 97% 97%   Weight:    157 lb (71.2 kg)   Height:    5' 2\" (1.575 m)                                              BP Readings from Last 3 Encounters:   08/10/20 (!) 175/78   08/06/20 (!) 176/80   07/24/20 (!) 144/82       NPO Status: Time of last liquid consumption: 1800                        Time of last solid consumption: 1800                        Date of last liquid consumption: 08/09/20                        Date of last solid food consumption: 08/09/20    BMI:   Wt Readings from Last 3 Encounters:   08/10/20 157 lb (71.2 kg)   08/06/20 157 lb (71.2 kg)   07/24/20 158 lb (71.7 kg)     Body mass index is 28.72 kg/m².     CBC:   Lab Results   Component Value Date    WBC 6.4 08/10/2020    RBC 3.44 08/10/2020    HGB 11.7 08/10/2020    HCT 33.5 08/10/2020    MCV 97.3 08/10/2020    RDW 12.9 08/10/2020     08/10/2020       CMP:   Lab Results   Component Value Date     08/10/2020    K 3.7 08/10/2020     08/10/2020    CO2 22 08/10/2020    BUN 16 08/10/2020    CREATININE 0.57 08/10/2020    GFRAA >60.0 08/10/2020    LABGLOM >60.0 08/10/2020    GLUCOSE 97 08/10/2020    PROT 8.5 08/07/2020    CALCIUM 9.5 08/10/2020    BILITOT 0.5 08/07/2020    ALKPHOS 53 08/07/2020    AST 22 08/07/2020    ALT 27 08/07/2020       POC Tests: No results for input(s): POCGLU, POCNA, POCK, POCCL, POCBUN, POCHEMO, POCHCT in the last 72 hours. Coags:   Lab Results   Component Value Date    PROTIME 14.6 08/07/2020    INR 1.1 08/07/2020    APTT 22.3 08/07/2020       HCG (If Applicable): No results found for: PREGTESTUR, PREGSERUM, HCG, HCGQUANT     ABGs: No results found for: PHART, PO2ART, EQS7QLX, FAX8PCQ, BEART, R1VSLNJL     Type & Screen (If Applicable):  No results found for: LABABO, LABRH    Drug/Infectious Status (If Applicable):  No results found for: HIV, HEPCAB    COVID-19 Screening (If Applicable): No results found for: COVID19      Anesthesia Evaluation    Airway: Mallampati: II  TM distance: >3 FB   Neck ROM: full  Mouth opening: > = 3 FB Dental: normal exam         Pulmonary:Negative Pulmonary ROS breath sounds clear to auscultation                             Cardiovascular:    (+) hypertension:,       ECG reviewed  Rhythm: regular             Beta Blocker:  Dose within 24 Hrs         Neuro/Psych:   Negative Neuro/Psych ROS              GI/Hepatic/Renal: Neg GI/Hepatic/Renal ROS            Endo/Other: Negative Endo/Other ROS                    Abdominal:           Vascular: negative vascular ROS. Anesthesia Plan      general     ASA 2     (US guided Erector Spinae plane block)  Induction: intravenous. MIPS: Prophylactic antiemetics administered. Anesthetic plan and risks discussed with patient. Use of blood products discussed with patient whom consented to blood products. Plan discussed with CRNA.     Attending anesthesiologist reviewed and agrees with Pre Eval content              Samaria Johnson MD   8/10/2020

## 2020-08-10 NOTE — ANESTHESIA PROCEDURE NOTES
Peripheral Block    Patient location during procedure: pre-op  Start time: 8/10/2020 1:42 PM  End time: 8/10/2020 1:52 PM  Staffing  Anesthesiologist: Eliza White MD  Performed: anesthesiologist   Preanesthetic Checklist  Completed: patient identified, site marked, surgical consent, pre-op evaluation, timeout performed, IV checked, risks and benefits discussed, monitors and equipment checked, anesthesia consent given, oxygen available and patient being monitored  Peripheral Block  Patient position: sitting  Prep: ChloraPrep  Patient monitoring: cardiac monitor, continuous pulse ox, frequent blood pressure checks and IV access  Block type: Erector spinae (AT Level T 7)  Laterality: bilateral  Injection technique: single-shot  Procedures: ultrasound guided  Local infiltration: ropivacaine, lidocaine and decadron  Infiltration strength: 0.5 %  Dose: 25 mL  Provider prep: mask and sterile gloves (Sterile probe cover)  Local infiltration: ropivacaine, lidocaine and decadron  Needle  Needle type: combined needle/nerve stimulator   Needle gauge: 21 G  Needle length: 10 cm  Needle localization: anatomical landmarks and ultrasound guidance  Assessment  Injection assessment: negative aspiration for heme, no paresthesia on injection and local visualized surrounding nerve on ultrasound  Paresthesia pain: immediately resolved  Slow fractionated injection: yes  Hemodynamics: stable  Additional Notes  Ultrasound image printed and saved in patient chart.     Sterile probe cover used    Reason for block: post-op pain management and at surgeon's request

## 2020-08-10 NOTE — PROGRESS NOTES
Hospitalist Daily Progress Note  Name: Alexis Miller  Age: 65 y.o. Gender: female  CodeStatus: Full Code  Allergies: Codeine    Chief Complaint:Other (pt sent from MRI for tx )      Primary Care Provider: LADONNA Pearl CNP    InpatientTreatment Team: Treatment Team: Attending Provider: Ana Laura Oliveros MD; Consulting Physician: Jurgen Ang MD; Surgeon: Jurgen Ang MD; Utilization Reviewer: Soco Amaya, RN; Patient Care Tech: [facility]; Registered Nurse: Trevor Byers, RN; : Kyle Lopez, RN; Utilization Reviewer: Mellisa Deluca, RN; Patient Care Tech: Enio Alva    Admission Date: 8/7/2020      Subjective: No chest pain, sob, nausea. Awaiting surgery today. Physical Exam  Vitals signs and nursing note reviewed. Constitutional:       Appearance: Normal appearance. Cardiovascular:      Rate and Rhythm: Normal rate and regular rhythm. Pulmonary:      Effort: Pulmonary effort is normal.      Breath sounds: Normal breath sounds. Abdominal:      General: Bowel sounds are normal.      Palpations: Abdomen is soft. Musculoskeletal: Normal range of motion. Skin:     General: Skin is warm and dry. Neurological:      Mental Status: She is alert and oriented to person, place, and time. Mental status is at baseline.          Medications:  Reviewed    Infusion Medications:    lactated ringers 100 mL/hr at 08/10/20 1414    sodium chloride       Scheduled Medications:    cefTRIAXone (ROCEPHIN) IV  1 g Intravenous Q24H    sodium chloride flush  10 mL Intravenous 2 times per day    atenolol  50 mg Oral Daily    calcium elemental  500 mg Oral Daily     PRN Meds: fentanNYL, diphenhydrAMINE, ondansetron, metoclopramide, meperidine, thrombin, gelatin adsorbable, sodium chloride, bupivacaine-EPINEPHrine PF, tiZANidine, sodium chloride flush, acetaminophen **OR** acetaminophen, polyethylene glycol, promethazine **OR** ondansetron, HYDROmorphone, traMADol    Labs:   Recent Labs     08/08/20  0556 08/09/20  0547 08/10/20  0558   WBC 8.0 6.1 6.4   HGB 11.3* 10.7* 11.7*   HCT 32.8* 30.8* 33.5*    237 242     Recent Labs     08/08/20  0556 08/09/20  0547 08/10/20  0558    135 135   K 4.1 4.2 3.7    100 100   CO2 22 28 22   BUN 16 16 16   CREATININE 0.60 0.58 0.57   CALCIUM 9.1 8.9 9.5     No results for input(s): AST, ALT, BILIDIR, BILITOT, ALKPHOS in the last 72 hours. No results for input(s): INR in the last 72 hours. No results for input(s): [place] in the last 72 hours. Urinalysis:   Lab Results   Component Value Date    NITRU POSITIVE 08/08/2020    WBCUA  08/08/2020    BACTERIA MANY 08/08/2020    RBCUA 10-20 08/08/2020    BLOODU SMALL 08/08/2020    SPECGRAV 1.018 08/08/2020    GLUCOSEU Negative 08/08/2020       Radiology:   Most recent    Chest CT      WITH CONTRAST:No results found for this or any previous visit. WITHOUT CONTRAST: No results found for this or any previous visit. CXR      2-view: No results found for this or any previous visit. Portable: No results found for this or any previous visit. Echo No results found for this or any previous visit.           Assessment/Plan:    Active Hospital Problems    Diagnosis Date Noted    Thoracic compression fracture, closed, initial encounter ([facility] [S22.000A] 08/07/2020    Crush fracture of vertebra due to osteoporosis ([facility] [M80.08XA] 08/07/2020    Paraparesis ([facility] [G82.20] 08/07/2020    Acquired spondylolisthesis of thoracic spine [M43.14] 08/07/2020    Spinal stenosis, thoracic [M48.04] 08/07/2020     Thoracic compression fracture: Pain control with narcotics,  Neurosurgery consult in the ED with recommendation for surgical intervention    8/8 - planning for sx 8/10   8/9 - no significant change, poss sx 8/10.   8/10 - OR today, post op care    HTN: Resume antihypertensive blood pressure monitoring     Pt can proceed with surgery with minimal surgical risk per  NSQIP

## 2020-08-11 LAB
ANION GAP SERPL CALCULATED.3IONS-SCNC: 8 MEQ/L (ref 9–15)
BASOPHILS ABSOLUTE: 0 K/UL (ref 0–0.2)
BASOPHILS RELATIVE PERCENT: 0.3 %
BUN BLDV-MCNC: 13 MG/DL (ref 8–23)
CALCIUM SERPL-MCNC: 8.9 MG/DL (ref 8.5–9.9)
CHLORIDE BLD-SCNC: 102 MEQ/L (ref 95–107)
CO2: 26 MEQ/L (ref 20–31)
CREAT SERPL-MCNC: 0.61 MG/DL (ref 0.5–0.9)
EOSINOPHILS ABSOLUTE: 0 K/UL (ref 0–0.7)
EOSINOPHILS RELATIVE PERCENT: 0 %
GFR AFRICAN AMERICAN: >60
GFR NON-AFRICAN AMERICAN: >60
GLUCOSE BLD-MCNC: 127 MG/DL (ref 70–99)
HCT VFR BLD CALC: 28.1 % (ref 37–47)
HEMOGLOBIN: 9.8 G/DL (ref 12–16)
LYMPHOCYTES ABSOLUTE: 0.7 K/UL (ref 1–4.8)
LYMPHOCYTES RELATIVE PERCENT: 10 %
MCH RBC QN AUTO: 34 PG (ref 27–31.3)
MCHC RBC AUTO-ENTMCNC: 34.8 % (ref 33–37)
MCV RBC AUTO: 97.7 FL (ref 82–100)
MONOCYTES ABSOLUTE: 0.4 K/UL (ref 0.2–0.8)
MONOCYTES RELATIVE PERCENT: 5.3 %
NEUTROPHILS ABSOLUTE: 6 K/UL (ref 1.4–6.5)
NEUTROPHILS RELATIVE PERCENT: 84.4 %
PDW BLD-RTO: 13.5 % (ref 11.5–14.5)
PLATELET # BLD: 253 K/UL (ref 130–400)
POTASSIUM REFLEX MAGNESIUM: 4.4 MEQ/L (ref 3.4–4.9)
RBC # BLD: 2.87 M/UL (ref 4.2–5.4)
SODIUM BLD-SCNC: 136 MEQ/L (ref 135–144)
WBC # BLD: 7.1 K/UL (ref 4.8–10.8)

## 2020-08-11 PROCEDURE — 36415 COLL VENOUS BLD VENIPUNCTURE: CPT

## 2020-08-11 PROCEDURE — 97166 OT EVAL MOD COMPLEX 45 MIN: CPT

## 2020-08-11 PROCEDURE — 6370000000 HC RX 637 (ALT 250 FOR IP): Performed by: NEUROLOGICAL SURGERY

## 2020-08-11 PROCEDURE — 6360000002 HC RX W HCPCS: Performed by: NEUROLOGICAL SURGERY

## 2020-08-11 PROCEDURE — 85025 COMPLETE CBC W/AUTO DIFF WBC: CPT

## 2020-08-11 PROCEDURE — 80048 BASIC METABOLIC PNL TOTAL CA: CPT

## 2020-08-11 PROCEDURE — 6370000000 HC RX 637 (ALT 250 FOR IP): Performed by: NURSE PRACTITIONER

## 2020-08-11 PROCEDURE — 2700000000 HC OXYGEN THERAPY PER DAY

## 2020-08-11 PROCEDURE — 1210000000 HC MED SURG R&B

## 2020-08-11 PROCEDURE — 2580000003 HC RX 258: Performed by: NEUROLOGICAL SURGERY

## 2020-08-11 PROCEDURE — 97162 PT EVAL MOD COMPLEX 30 MIN: CPT

## 2020-08-11 PROCEDURE — 97535 SELF CARE MNGMENT TRAINING: CPT

## 2020-08-11 RX ORDER — FENTANYL 12 UG/H
1 PATCH TRANSDERMAL
Status: DISCONTINUED | OUTPATIENT
Start: 2020-08-11 | End: 2020-08-16 | Stop reason: HOSPADM

## 2020-08-11 RX ADMIN — CALCIUM 500 MG: 500 TABLET ORAL at 08:09

## 2020-08-11 RX ADMIN — SODIUM CHLORIDE: 9 INJECTION, SOLUTION INTRAVENOUS at 18:25

## 2020-08-11 RX ADMIN — CEFAZOLIN SODIUM 2 G: 2 SOLUTION INTRAVENOUS at 14:30

## 2020-08-11 RX ADMIN — BISACODYL 5 MG: 5 TABLET, COATED ORAL at 08:09

## 2020-08-11 RX ADMIN — Medication 10 ML: at 20:04

## 2020-08-11 RX ADMIN — CEFAZOLIN SODIUM 2 G: 2 SOLUTION INTRAVENOUS at 04:24

## 2020-08-11 RX ADMIN — TRAMADOL HYDROCHLORIDE 50 MG: 50 TABLET, FILM COATED ORAL at 14:29

## 2020-08-11 RX ADMIN — HYDROMORPHONE HYDROCHLORIDE 0.5 MG: 1 INJECTION, SOLUTION INTRAMUSCULAR; INTRAVENOUS; SUBCUTANEOUS at 18:24

## 2020-08-11 RX ADMIN — TIZANIDINE 4 MG: 4 TABLET ORAL at 14:30

## 2020-08-11 RX ADMIN — OXYCODONE 5 MG: 5 TABLET ORAL at 07:56

## 2020-08-11 RX ADMIN — SODIUM CHLORIDE: 9 INJECTION, SOLUTION INTRAVENOUS at 07:56

## 2020-08-11 RX ADMIN — ACETAMINOPHEN 650 MG: 325 TABLET, FILM COATED ORAL at 20:34

## 2020-08-11 ASSESSMENT — PAIN DESCRIPTION - LOCATION
LOCATION: BACK
LOCATION: BACK

## 2020-08-11 ASSESSMENT — PAIN DESCRIPTION - DESCRIPTORS
DESCRIPTORS: ACHING
DESCRIPTORS: ACHING

## 2020-08-11 ASSESSMENT — PAIN SCALES - GENERAL
PAINLEVEL_OUTOF10: 4
PAINLEVEL_OUTOF10: 2
PAINLEVEL_OUTOF10: 3
PAINLEVEL_OUTOF10: 5
PAINLEVEL_OUTOF10: 7
PAINLEVEL_OUTOF10: 2
PAINLEVEL_OUTOF10: 0

## 2020-08-11 ASSESSMENT — PAIN DESCRIPTION - PAIN TYPE
TYPE: SURGICAL PAIN
TYPE: SURGICAL PAIN

## 2020-08-11 ASSESSMENT — PAIN DESCRIPTION - ORIENTATION
ORIENTATION: MID
ORIENTATION: MID

## 2020-08-11 NOTE — PROGRESS NOTES
[facility] OCCUPATIONAL THERAPY EVALUATION - ACUTE     NAME: Dionna Severino  : 1950 (65 y.o.)  MRN: [id number]  CODE STATUS: Full Code  Room: W789/E291-00    Date of Service: 2020    Patient Diagnosis(es): Thoracic compression fracture, closed, initial encounter [facility]) [S22.000A]   Chief Complaint   Patient presents with    Other     pt sent from MRI for tx      Patient Active Problem List    Diagnosis Date Noted    Myelopathy of thoracic region 2020    Thoracic compression fracture, closed, initial encounter ([facility] 2020    Crush fracture of vertebra due to osteoporosis ([facility] 2020    Paraparesis ([facility] 2020    Acquired spondylolisthesis of thoracic spine 2020    Spinal stenosis, thoracic 2020    Actinic dermatitis     Acne rosacea     Dermatitis     Hypertension     Osteopenia     Anxiety         Past Medical History:   Diagnosis Date    Acne rosacea     Actinic dermatitis     biopsy proven 2018    Anxiety     Dermatitis     Hypertension     Osteopenia      Past Surgical History:   Procedure Laterality Date    CATARACT REMOVAL      bilateral        Restrictions  Position Activity Restriction  Spinal Precautions: No Bending, No Lifting, No Twisting     Safety Devices: Safety Devices  Safety Devices in place: Yes  Type of devices:  All fall risk precautions in place        Subjective  Pre Treatment Pain Screening  Pain at present: 2  Scale Used: Numeric Score  Intervention List: Patient able to continue with treatment, Patient declined any intervention    Pain Reassessment:   Pain Assessment  Patient Currently in Pain: Yes  Pain Assessment: 0-10  Pain Level: 2  Pain Type: Surgical pain  Pain Location: Back  Pain Orientation: Mid  Pain Descriptors: Aching       Prior Level of Function:  Social/Functional History  Lives With: Alone  Type of Home: House  Home Layout: One level  Home Access: Stairs to enter with rails  Entrance Stairs - Number of Steps: 3  Entrance Stairs - Rails: Both  Bathroom Shower/Tub: Walk-in shower  Home Equipment: Cane, Standard walker  ADL Assistance: Independent  Homemaking Assistance: Independent  Ambulation Assistance: Independent  Transfer Assistance: Independent  Active : Yes  Additional Comments: Daughter and granddahospitals    OBJECTIVE:     Orientation Status:  Orientation  Overall Orientation Status: Within Normal Limits    Observation:  Observation/Palpation  Posture: Good  Observation: surgical dressing intact, drain intact    Cognition Status:  Cognition  Overall Cognitive Status: WFL    Perception Status:  Perception  Overall Perceptual Status: WFL    Sensation Status:  Sensation  Overall Sensation Status: WFL    Vision and Hearing Status:  Vision  Vision: Within Functional Limits  Hearing  Hearing: Within functional limits     ROM:   LUE AROM (degrees)  LUE AROM : WFL  Left Hand AROM (degrees)  Left Hand AROM: WFL  RUE AROM (degrees)  RUE AROM : WFL  Right Hand AROM (degrees)  Right Hand AROM: WFL    Strength:  LUE Strength  Gross LUE Strength: Exceptions to [facility]  L Hand General: 3+/5  LUE Strength Comment: 3+/5 all planes  RUE Strength  Gross RUE Strength: Exceptions to [facility]  R Hand General: 3+/5  RUE Strength Comment: 3+/5 all planes    Coordination, Tone, Quality of Movement: Tone RUE  RUE Tone: Normotonic  Tone LUE  LUE Tone: Normotonic  Coordination  Movements Are Fluid And Coordinated: Yes    Hand Dominance:  Hand Dominance  Hand Dominance: Right    ADL Status:  ADL  Feeding: Independent  Grooming: Supervision  UE Bathing: Minimal assistance  LE Bathing: Dependent/Total  UE Dressing: Minimal assistance  LE Dressing: Dependent/Total  Toileting: Dependent/Total  Additional Comments: Simulated ADLs as above. Pt. limited by fatigue and dizziness when sitting EOB.  Pt. also very anxious requiring max encouragement to attempt  Toilet Transfers  Toilet Transfer: Unable to assess  Toilet Transfers Comments: Anticipate recliner; pt. reports G understanding and will look into on d/c.    OT Education:   OT Education  OT Education: OT Role, Plan of Care  Patient Education: Educated pt. on transfers, importance of mobility, safe mobility  Barriers to Learning: None    OT Follow Up:  OT D/C RECOMMENDATIONS  REQUIRES OT FOLLOW UP: Yes       Assessment/Discharge Disposition:  Assessment: Pt. is a 30year old woman from home alone who presents to [address] [facility] with the above deficits which impact her ability to perform ADLs and IADLs. Pt. would benefit from OT to maximize independence and safety with transfers and ADLs.   Performance deficits / Impairments: Decreased functional mobility , Decreased ADL status, Decreased strength, Decreased endurance, Decreased balance, Decreased high-level IADLs  Prognosis: Good  Discharge Recommendations: Continue to assess pending progress  Decision Making: Medium Complexity  History: Pt's medical history is moderately complex  Exam: Pt. has 6 performance deficits  Assistance / Modification: Pt. requires min A    Six Click Score   How much help for putting on and taking off regular lower body clothing?: Total  How much help for Bathing?: A Lot  How much help for Toileting?: Total  How much help for putting on and taking off regular upper body clothing?: A Little  How much help for taking care of personal grooming?: A Little  How much help for eating meals?: None  AM-[facility] Inpatient Daily Activity Raw Score: 14  AM-PAC Inpatient ADL T-Scale Score : 33.39  ADL Inpatient CMS 0-100% Score: 59.67    Plan:  Plan  Times per week: 1-3x  Plan weeks: Length of acute stay  Current Treatment Recommendations: Strengthening, Balance Training, Functional Mobility Training, Endurance Training, Pain Management, Safety Education & Training, Patient/Caregiver Education & Training, Self-Care / ADL, Equipment Evaluation, Education, & procurement, Neuromuscular Re-education, Home Management Training, Positioning    Goals:   Patient

## 2020-08-11 NOTE — PROGRESS NOTES
Hospitalist Daily Progress Note  Name: Haresh Bajwa  Age: 65 y.o. Gender: female  CodeStatus: Full Code  Allergies: Codeine    Chief Complaint:Other (pt sent from MRI for tx )      Primary Care Provider: LADONNA Tejeda CNP    InpatientTreatment Team: Treatment Team: Attending Provider: Sherren Pyles, MD; Consulting Physician: Alycia Stern MD; Surgeon: Alycia Stern MD; Utilization Reviewer: Digna Draper RN; Registered Nurse: No Ordonez RN; : Deanne Yeager RN; Utilization Reviewer: Maridee Apgar, JERMAINE; Patient Care Tech: Elginlaura Ferraricristykervin; : Vinayak Trevino RN; Patient Care Tech: Mack Castellanos; Registered Nurse: Yong Calderon RN    Admission Date: 2020      Subjective: No chest pain, sob, nausea. Post op pain control adequate. Physical Exam  Vitals signs and nursing note reviewed. Constitutional:       Appearance: Normal appearance. Cardiovascular:      Rate and Rhythm: Normal rate and regular rhythm. Pulmonary:      Effort: Pulmonary effort is normal.      Breath sounds: Normal breath sounds. Abdominal:      General: Bowel sounds are normal.      Palpations: Abdomen is soft. Musculoskeletal: Normal range of motion. Skin:     General: Skin is warm and dry. Neurological:      Mental Status: She is alert and oriented to person, place, and time. Mental status is at baseline.          Medications:  Reviewed    Infusion Medications:    sodium chloride 100 mL/hr at 20 0756     Scheduled Medications:    sodium chloride flush  10 mL Intravenous 2 times per day    bisacodyl  5 mg Oral Daily    cefTRIAXone (ROCEPHIN) IV  1 g Intravenous Q24H    sodium chloride flush  10 mL Intravenous 2 times per day    atenolol  50 mg Oral Daily    calcium elemental  500 mg Oral Daily     PRN Meds: sodium chloride flush, HYDROmorphone **OR** HYDROmorphone, acetaminophen, oxyCODONE, ondansetron **OR** ondansetron, calcium carbonate, tiZANidine, today, post op care   8/11 - pod 1, tolerating, cont plans for discharge/rehab    HTN: Resume antihypertensive blood pressure monitoring     Pt can proceed with surgery with minimal surgical risk per  NSQIP calculator, patient has no  Pulmonary or cardiac compromise at this time.      DVT Prophylaxis:SCD for now, anticipating surgery  Diet: DIET GENERAL;  Code Status: Full Code      Electronically signed by Donna Vega MD on 8/11/2020 at 4:30 PM

## 2020-08-11 NOTE — OP NOTE
[address]                      [address], [address] [facility]                                OPERATIVE REPORT    PATIENT NAME: Jaylin Mejia                      :        1950  MED REC NO:   [id number]                            ROOM:       B857  ACCOUNT NO:   [de-identified]                           ADMIT DATE: 2020  PROVIDER:     Francisca Holman MD    DATE OF PROCEDURE:  08/10/2020    PREOPERATIVE DIAGNOSIS:  T8 osteoporotic severe compression fracture  with myelopathy and instability. POSTOPERATIVE DIAGNOSIS:  T8 osteoporotic severe compression fracture  with myelopathy and instability. OPERATION PERFORMED:  T8 complete laminectomy; transpedicular  decompression; and T6, T7, T9, and T10 pedicle screw and posterolateral  fusion. SURGEON:  Francisca Holman MD    DESCRIPTION OF PROCEDURE:  The patient was given general endotracheal  anesthesia in supine position. Bonilla catheter established. Ancef IV  preoperatively. Turned to the prone position with her arms gently  tucked at the sides. Back was prepped and draped. Time-out, the  patient identified. Needle was placed. X-rays obtained to confirm  level. Needle was withdrawn. Skin infiltrated with solution of 0.5%  Marcaine with epinephrine. A 10-inch incision was then made from the  tips of the spinous processes of T5, T6, T7, T8, T9, and T10. Dissection was carried down to the spinous process tips. First on the  left than on the right side, the spinous processes, lamina, and  transverse processes were exposed at T6, T7, T8, T9, and T10. The  tracker was then placed at T5. We then obtained a spin with the x-ray  machine to correlate with the image-guided system. This was then  registered with the computer. It was accurate. I used the image-guided  system and the gearshift, I was able to track the exact entry point  trajectory and length for the pedicle screws.   This was first done at T6  on the right then on the left, T7 on the right and then on the left, T9  on the right then on the left, and T10 on the right and left. This was  done by using the image-guided system with the tracker palpating the  hole to make sure there were bony edges all the way around and then  placing the screw. Once the screws were in place, the AP and lateral  x-rays obtained to confirm that they were in excellent position. A  self-retaining retractor was placed. The spinous process of T8 and most  of T7 were removed. A decompressive laminectomy was then completely  performed out laterally. There were some bone fragments that were  compressing on the lateral aspect of the dural sac, which were removed. I saw the exit of this T8 nerve root. There was bone impacted into the  uppermost portion of the T8 nerve root, which were removed and a  foraminotomy performed. Bone had been impacted into the axilla of the  T7 nerve root and that was also removed. I then did a biopsy of the T8  vertebral body, which was sent to Pathology. The same was then done on  the right side. Transpedicular approach was made to decompress the  dural sac. The T7 axilla of the nerve root was decompressed, another  couple of pieces of bone removed off the lateral aspect of the dural sac  and a foraminotomy performed for the exit of the T8 nerve root  decompressing all four nerve roots. Bleeding was controlled with  bipolar coagulation, Surgiflo was also used. The transverse processes  were decorticated and allograft autogenous bone was placed  posterolaterally for the posterolateral fusion. Rods were then  measured, cut, partially bent, and then placed into the heads of the  pedicle screws first on the right and then on the left side. The screws  were torqued into position. The wound was thoroughly irrigated. A  wound drain was placed. The deep layers were closed with 0 Vicryl  suture.   Subcutaneous tissues were closed with 2-0 and 3-0 Vicryl  suture.  mL. No blood transfused. Setscrews from [employer]. The screws were 4.5 x 35 mm, [id number]. 5.0 x  35 mm, numbers [id number]; 5.5 x 40 mm screws, numbers [id number]; 300 mm  cele number [id number]; setscrews [id number], titanium MRI compatible. AttraX scaffold two strips reference [id number]. Dressing was applied. The patient was taken to the recovery room.         Jesus Dietz MD    D: 08/10/2020 20:28:42       T: 08/10/2020 20:33:20     [email or web address]  Job#: [id number]     Doc#: [id number]    CC:

## 2020-08-11 NOTE — BRIEF OP NOTE
Brief Postoperative Note      Patient: Adriana Govea  YOB: 1950  MRN: [id number]    Date of Procedure: 8/10/2020    Pre-Op Diagnosis: INPATIENT    Post-Op Diagnosis: Same       Procedure(s):  T7-8-9  DECOMPRESSION POSTEROLATERAL FUSION PEDICLE SCREWS T6-7-10-11    Surgeon(s):  Rosalinda Draper MD    Assistant:  First Assistant: Gregorio Cook    Anesthesia: General    Estimated Blood Loss (mL): 431    Complications: None    Specimens:   ID Type Source Tests Collected by Time Destination   A : T-8 bone biopsy Bone Spine SURGICAL PATHOLOGY Rosalinda Draper MD 8/10/2020 1901        Implants:  Implant Name Type Inv.  Item Serial No.  Lot No. LRB No. Used Action   Attrax scaffold    [employer] [id number] N/A 1 Implanted   SCREW LK RELINE OPN TULIP 5.5MM Spine SCREW LK RELINE OPN TULIP 5.5MM  [employer]  N/A 8 Implanted   SCREW RELINE O 2S POLYAXIAL 5.5X40MM Spine SCREW RELINE O 2S POLYAXIAL 5.5X40MM  [employer]  N/A 4 Implanted   SCREW RELINE O 2S POLYAXIAL 4.5X35MM    [employer]  N/A 2 Implanted   SCREW RELINE O 2S POLYAXIAL 5.0X35MM    [employer]  N/A 2 Implanted   KIT SEALANT SURGIFLO HEMOSTATIC MATRIX Bone/Graft/Tissue/Human/Synth KIT SEALANT SURGIFLO HEMOSTATIC MATRIX  [place]: [employer] [id number] N/A 1 Implanted   FARIHA RELINE-O TI [id number] STR Spine FARIHA RELINE-O TI [id number] STR  [employer]  N/A 1 Implanted         Drains:   Closed/Suction Drain Superior;Medial Back Accordion 10 Sudanese (Active)       Urethral Catheter Temperature probe 16 fr (Active)       Findings: fracture t8 and instability    Electronically signed by Venkata Orantes MD on 8/10/2020 at 8:24 PM

## 2020-08-11 NOTE — PROGRESS NOTES
Patient returned from surgery and complains of heartburn. No pain in her back. Vitals assessed and recorded. IV fluids are infusing thru the Alaris pump. Bonilla in place and draining. Leg strap attached.

## 2020-08-11 NOTE — FLOWSHEET NOTE
1015- Bonilla removed, catheter tip intact, patient tolerated well. 1515-Suction drained and recompressed, 70ml bright red blood present.

## 2020-08-11 NOTE — PLAN OF CARE
See OT evaluation for all goals and OT POC.  Electronically signed by ALLI Dickens/L on 8/11/2020 at 12:51 PM

## 2020-08-11 NOTE — PROGRESS NOTES
3  Entrance Stairs - Rails: Both  Bathroom Shower/Tub: Walk-in shower  Home Equipment: Cane, Standard walker  ADL Assistance: Independent  Homemaking Assistance: Independent  Ambulation Assistance: Independent  Transfer Assistance: Independent  Active : Yes  Additional Comments: Daughter and granddajoan local    OBJECTIVE:   Vision: Within Functional Limits  Hearing: Within functional limits    Cognition:  Overall Orientation Status: Within Functional Limits  Follows Commands: Within Functional Limits    Observation/Palpation  Posture: Good  Observation: surgical dressing intact, drain intact    ROM:  RLE PROM: WFL  LLE PROM: WFL    Strength:  Strength RLE  Comment: grossly 3/5 strength observed, NT due to recent surgery and increased pain  Strength LLE  Comment: grossly 3/5 strength observed, NT due to recent surgery and increased pain  Strength Other  Other: Decreased core strength based off functional mobility. Neuro:  Balance  Sitting - Static: Fair;+  Sitting - Dynamic: Fair  Comments: Patient able to sit on EOB with SBA (pt reports feeling lightheaded with seated position)        Sensation  Overall Sensation Status: WFL    Bed mobility  Rolling to Right: Contact guard assistance  Supine to Sit: Moderate assistance  Sit to Supine: Maximum assistance;2 Person assistance    Transfers  Sit to Stand: (NT, patient reports \"lightheadness\" upon seated position)       ASSESSMENT:   Body structures, Functions, Activity limitations: Decreased functional mobility ; Decreased strength;Decreased endurance;Decreased balance;Decreased posture; Increased pain;Decreased ADL status  Decision Making: Medium Complexity  History: high  Exam: high  Clinical Presentation: evolving    Prognosis: Good    DISCHARGE RECOMMENDATIONS:  Discharge Recommendations: Continue to assess pending progress    Assessment: Patient demonstrates decline in functional mobility and balance s/p back surgery.   Further physical therapy recommended to improve mobility, strength, and balance prior to D/C home. REQUIRES PT FOLLOW UP: Yes      PLAN OF CARE:  Plan  Times per week: 3-6  Current Treatment Recommendations: Strengthening, Balance Training, Functional Mobility Training, Transfer Training, Endurance Training, Gait Training, Neuromuscular Re-education, Home Exercise Program, Safety Education & Training, Equipment Evaluation, Education, & procurement, Patient/Caregiver Education & Training  Safety Devices  Type of devices: All fall risk precautions in place, Bed alarm in place, Call light within reach    Goals:  Long term goals  Long term goal 1: Patient will be SBA with bed mobility. Long term goal 2: Patient will be SBA with transfers. Long term goal 3: Patient will ambulate 100 ft using LRD with SBA. Long term goal 4: Patient will ascend/descend 4 steps using 2 HRs with CGA. [facility] (6 CLICK) BASIC MOBILITY  AM-PAC Inpatient Mobility Raw Score : 9     Therapy Time:   Individual   Time In 1105   Time Out 1134   Minutes [address] [place] [address], [address] [facility], 08/11/20 at 11:58 AM         Definitions for assistance levels  Independent = pt does not require any physical supervision or assistance from another person for activity completion. Device may be needed.   Stand by assistance = pt requires verbal cues or instructions from another person, close to but not touching, to perform the activity  Minimal assistance= pt performs 75% or more of the activity; assistance is required to complete the activity  Moderate assistance= pt performs 50% of the activity; assistance is required to complete the activity  Maximal assistance = pt performs 25% of the activity; assistance is required to complete the activity  Dependent = pt requires total physical assistance to accomplish the task

## 2020-08-12 LAB
ANION GAP SERPL CALCULATED.3IONS-SCNC: 6 MEQ/L (ref 9–15)
BASOPHILS ABSOLUTE: 0 K/UL (ref 0–0.2)
BASOPHILS RELATIVE PERCENT: 0.2 %
BUN BLDV-MCNC: 14 MG/DL (ref 8–23)
CALCIUM SERPL-MCNC: 8.2 MG/DL (ref 8.5–9.9)
CHLORIDE BLD-SCNC: 108 MEQ/L (ref 95–107)
CO2: 24 MEQ/L (ref 20–31)
CREAT SERPL-MCNC: 0.56 MG/DL (ref 0.5–0.9)
EOSINOPHILS ABSOLUTE: 0.1 K/UL (ref 0–0.7)
EOSINOPHILS RELATIVE PERCENT: 1.3 %
GFR AFRICAN AMERICAN: >60
GFR NON-AFRICAN AMERICAN: >60
GLUCOSE BLD-MCNC: 119 MG/DL (ref 70–99)
HCT VFR BLD CALC: 24.2 % (ref 37–47)
HEMOGLOBIN: 8.2 G/DL (ref 12–16)
LYMPHOCYTES ABSOLUTE: 1.2 K/UL (ref 1–4.8)
LYMPHOCYTES RELATIVE PERCENT: 14.8 %
MCH RBC QN AUTO: 33.3 PG (ref 27–31.3)
MCHC RBC AUTO-ENTMCNC: 33.9 % (ref 33–37)
MCV RBC AUTO: 98.1 FL (ref 82–100)
MONOCYTES ABSOLUTE: 0.7 K/UL (ref 0.2–0.8)
MONOCYTES RELATIVE PERCENT: 8.9 %
NEUTROPHILS ABSOLUTE: 6.1 K/UL (ref 1.4–6.5)
NEUTROPHILS RELATIVE PERCENT: 74.8 %
PDW BLD-RTO: 13.7 % (ref 11.5–14.5)
PLATELET # BLD: 222 K/UL (ref 130–400)
POTASSIUM REFLEX MAGNESIUM: 4 MEQ/L (ref 3.4–4.9)
RBC # BLD: 2.47 M/UL (ref 4.2–5.4)
SODIUM BLD-SCNC: 138 MEQ/L (ref 135–144)
WBC # BLD: 8.1 K/UL (ref 4.8–10.8)

## 2020-08-12 PROCEDURE — 6360000002 HC RX W HCPCS: Performed by: INTERNAL MEDICINE

## 2020-08-12 PROCEDURE — 1210000000 HC MED SURG R&B

## 2020-08-12 PROCEDURE — 2700000000 HC OXYGEN THERAPY PER DAY

## 2020-08-12 PROCEDURE — 2580000003 HC RX 258: Performed by: INTERNAL MEDICINE

## 2020-08-12 PROCEDURE — 2580000003 HC RX 258: Performed by: NEUROLOGICAL SURGERY

## 2020-08-12 PROCEDURE — 97112 NEUROMUSCULAR REEDUCATION: CPT

## 2020-08-12 PROCEDURE — 85025 COMPLETE CBC W/AUTO DIFF WBC: CPT

## 2020-08-12 PROCEDURE — 97535 SELF CARE MNGMENT TRAINING: CPT

## 2020-08-12 PROCEDURE — 36415 COLL VENOUS BLD VENIPUNCTURE: CPT

## 2020-08-12 PROCEDURE — 6370000000 HC RX 637 (ALT 250 FOR IP): Performed by: NEUROLOGICAL SURGERY

## 2020-08-12 PROCEDURE — 6370000000 HC RX 637 (ALT 250 FOR IP): Performed by: NURSE PRACTITIONER

## 2020-08-12 PROCEDURE — 6360000002 HC RX W HCPCS: Performed by: NEUROLOGICAL SURGERY

## 2020-08-12 PROCEDURE — 6370000000 HC RX 637 (ALT 250 FOR IP): Performed by: INTERNAL MEDICINE

## 2020-08-12 PROCEDURE — 80048 BASIC METABOLIC PNL TOTAL CA: CPT

## 2020-08-12 RX ADMIN — ACETAMINOPHEN 650 MG: 325 TABLET, FILM COATED ORAL at 07:34

## 2020-08-12 RX ADMIN — TIZANIDINE 4 MG: 4 TABLET ORAL at 23:44

## 2020-08-12 RX ADMIN — CEFTRIAXONE SODIUM 1 G: 1 INJECTION, POWDER, FOR SOLUTION INTRAMUSCULAR; INTRAVENOUS at 23:44

## 2020-08-12 RX ADMIN — CALCIUM 500 MG: 500 TABLET ORAL at 09:40

## 2020-08-12 RX ADMIN — Medication 10 ML: at 21:38

## 2020-08-12 RX ADMIN — TIZANIDINE 4 MG: 4 TABLET ORAL at 02:34

## 2020-08-12 RX ADMIN — MELATONIN 5 MG: at 23:44

## 2020-08-12 RX ADMIN — BISACODYL 5 MG: 5 TABLET, COATED ORAL at 09:40

## 2020-08-12 RX ADMIN — ACETAMINOPHEN 650 MG: 325 TABLET, FILM COATED ORAL at 21:38

## 2020-08-12 RX ADMIN — TIZANIDINE 4 MG: 4 TABLET ORAL at 17:30

## 2020-08-12 RX ADMIN — TRAMADOL HYDROCHLORIDE 50 MG: 50 TABLET, FILM COATED ORAL at 21:38

## 2020-08-12 RX ADMIN — ATENOLOL 50 MG: 25 TABLET ORAL at 09:40

## 2020-08-12 RX ADMIN — SODIUM CHLORIDE: 9 INJECTION, SOLUTION INTRAVENOUS at 23:44

## 2020-08-12 RX ADMIN — HYDROMORPHONE HYDROCHLORIDE 0.5 MG: 1 INJECTION, SOLUTION INTRAMUSCULAR; INTRAVENOUS; SUBCUTANEOUS at 22:51

## 2020-08-12 RX ADMIN — TIZANIDINE 4 MG: 4 TABLET ORAL at 09:42

## 2020-08-12 RX ADMIN — CEFTRIAXONE SODIUM 1 G: 1 INJECTION, POWDER, FOR SOLUTION INTRAMUSCULAR; INTRAVENOUS at 01:22

## 2020-08-12 ASSESSMENT — PAIN SCALES - GENERAL
PAINLEVEL_OUTOF10: 4
PAINLEVEL_OUTOF10: 7
PAINLEVEL_OUTOF10: 5
PAINLEVEL_OUTOF10: 0
PAINLEVEL_OUTOF10: 10
PAINLEVEL_OUTOF10: 9

## 2020-08-12 ASSESSMENT — PAIN DESCRIPTION - LOCATION: LOCATION: BACK

## 2020-08-12 ASSESSMENT — PAIN DESCRIPTION - PAIN TYPE: TYPE: SURGICAL PAIN

## 2020-08-12 NOTE — PROGRESS NOTES
Postoperative day #2 posterior T8 decompression and stabilization with pedicle screws T6 910 posterior lateral fusion. Suffering from abdominal cramps but she is able to eat and has flatus and bladder control. Increased back pain prohibits her from standing up at this time. She is able to sit at the bed side for short terms. Wound is healing well. We will need discharge transfer to rehabilitation when accepted.

## 2020-08-12 NOTE — PLAN OF CARE
Problem: Falls - Risk of:  Goal: Will remain free from falls  Description: Will remain free from falls  Outcome: Ongoing  Goal: Absence of physical injury  Description: Absence of physical injury  Outcome: Ongoing     Problem: Pain:  Goal: Pain level will decrease  Description: Pain level will decrease  Outcome: Ongoing  Goal: Control of acute pain  Description: Control of acute pain  Outcome: Ongoing  Goal: Control of chronic pain  Description: Control of chronic pain  Outcome: Ongoing     Problem: Skin Integrity:  Goal: Will show no infection signs and symptoms  Description: Will show no infection signs and symptoms  Outcome: Ongoing  Goal: Absence of new skin breakdown  Description: Absence of new skin breakdown  Outcome: Ongoing     Problem: IP BALANCE  Goal: LTG - patient will maintain standing balance to allow for completion of daily activities  Outcome: Ongoing

## 2020-08-12 NOTE — DISCHARGE INSTR - COC
Continuity of Care Form    Patient Name: Hansa Neumann   :  1950  MRN:  [id number]    Admit date:  2020  Discharge date:  2020    Code Status Order: Full Code   Advance Directives:   Advance Care Flowsheet Documentation     Date/Time Healthcare Directive Type of Healthcare Directive Copy in [address] Agent's Name Healthcare Agent's Phone Number    08/10/20 1311  No, patient does not have an advance directive for healthcare treatment -- -- -- -- --    08/10/20 0656  No, patient does not have an advance directive for healthcare treatment -- -- -- -- --    20 1217  No, patient does not have an advance directive for healthcare treatment -- -- -- -- --          Admitting Physician:  Guanako Denise MD  PCP: Philbert Scheuermann, APRN - CNP    Discharging Nurse: Ale Alarcon RN  [address] Unit/Room#: J271/D312-28  Discharging Unit Phone Number: [phone]    Emergency Contact:   Extended Emergency Contact Information  Primary Emergency Contact: Francisca Laird [address] Phone: [phone]  Mobile Phone: [phone]  Relation: Child    Past Surgical History:  Past Surgical History:   Procedure Laterality Date    CATARACT REMOVAL      bilateral    LUMBAR FUSION N/A 8/10/2020    T7-8-9  DECOMPRESSION POSTEROLATERAL FUSION PEDICLE SCREWS T6-7-10-11 performed by Marcia Fernandez MD at [facility]       Immunization History:   Immunization History   Administered Date(s) Administered    Influenza Vaccine, unspecified formulation 2015    Influenza Virus Vaccine 2013, 11/10/2014    Influenza, High Dose (Fluzone 65 yrs and older) 2015, 2018, 10/09/2018    Influenza, Quadv, IM, PF (6 mo and older Fluzone, Flulaval, Fluarix, and 3 yrs and older Afluria) 2016    Influenza, Triv, inactivated, subunit, adjuvanted, IM (Fluad 65 yrs and older) 10/29/2019    Pneumococcal Conjugate 13-valent ([facility]) 2020 Active Problems:  Patient Active Problem List   Diagnosis Code    Hypertension I10    Osteopenia M85.80    Anxiety F41.9    Dermatitis L30.9    Acne rosacea L71.9    Actinic dermatitis L57.8    Thoracic compression fracture, closed, initial encounter ([facility] S22.000A    Crush fracture of vertebra due to osteoporosis ([facility] M80.08XA    Paraparesis ([facility] G82.20    Acquired spondylolisthesis of thoracic spine M43.14    Spinal stenosis, thoracic M48.04    Myelopathy of thoracic region M47.14       Isolation/Infection:   Isolation          No Isolation        Patient Infection Status     None to display          Nurse Assessment:  Last Vital Signs: BP (!) 129/55   Pulse 69   Temp 99.1 °F (37.3 °C)   Resp 18   Ht 5' 2\" (1.575 m)   Wt 157 lb (71.2 kg)   LMP  (LMP Unknown)   SpO2 100%   BMI 28.72 kg/m²     Last documented pain score (0-10 scale): Pain Level: 4  Last Weight:   Wt Readings from Last 1 Encounters:   08/10/20 157 lb (71.2 kg)     Mental Status:  oriented and alert    IV Access:  - None    Nursing Mobility/ADLs:  Walking   Assisted  Transfer  Assisted  Bathing  Assisted  Dressing  Assisted  Toileting  Assisted  Feeding  Independent  Med Admin  Independent  Med Delivery   whole    Wound Care Documentation and Therapy:        Elimination:  Continence:   · Bowel: Yes  · Bladder: No  Urinary Catheter: removed 8/15/2020  Colostomy/Ileostomy/Ileal Conduit: No       Date of Last BM: 8/16/2020  No intake or output data in the 24 hours ending 08/12/20 1704  I/O last 3 completed shifts:  In: -   Out: 70 [Drains:70]    Safety Concerns: At Risk for Falls    Impairments/Disabilities:      None    Nutrition Therapy:  Current Nutrition Therapy:   - Oral Diet:  Carb Control 4 carbs/meal (1800kcals/day)    Routes of Feeding: Oral  Liquids:  Thin Liquids  Daily Fluid Restriction: no  Last Modified Barium Swallow with Video (Video Swallowing Test): not done    Treatments at the Time of Hospital Discharge: Respiratory Treatments: none  Oxygen Therapy:  is not on home oxygen therapy. Ventilator:    - No ventilator support    Rehab Therapies: Physical Therapy  Weight Bearing Status/Restrictions: No weight bearing restirctions  Other Medical Equipment (for information only, NOT a DME order):  walker  Other Treatments: none    Patient's personal belongings (please select all that are sent with patient):  Glasses, cellphone, clothes    RN SIGNATURE:  Electronically signed by Deny Johnson RN on 8/16/20 at 2:35 PM EDT    CASE MANAGEMENT/SOCIAL WORK SECTION    Inpatient Status Date: 08/07/2020    Readmission Risk Assessment Score:  Readmission Risk              Risk of Unplanned Readmission:        9           Discharging to Facility/ Agency   · Name: [facility]  · Address:  · Phone:  · Fax:    Dialysis Facility (if applicable)   · Name:  · Address:  · Dialysis Schedule:  · Phone:  · Fax:    / signature: Electronically signed by KANG Ramirez on 8/12/20 at 5:04 PM EDT    PHYSICIAN SECTION    Prognosis: Good    Condition at Discharge: Stable    Rehab Potential (if transferring to Rehab): Good    Recommended Labs or Other Treatments After Discharge: cipro x 3 days    Physician Certification: I certify the above information and transfer of Sridhar Durham  is necessary for the continuing treatment of the diagnosis listed and that she requires [facility] for less 30 days.      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

## 2020-08-12 NOTE — PROGRESS NOTES
Shift assessment completed. Pt is alert and oriented x4, calm, cooperative. PERRLA, neuro assessment unremarkable. Heart and Lung sounds WDL. Bowel sounds present x4 quadrants. Pt had c/o pain and asked for tylenol so she was medicated with PRN tylenol and she was satisfied. I repositioned her in bed and she is resting comfortably with no requests. Will continue to monitor.

## 2020-08-12 NOTE — PROGRESS NOTES
Physical Therapy Med Surg Daily Treatment Note  Facility/Department: [facility]  Room: [facility] [place]/O251-53       NAME: Hardik Correa  : 1950 (65 y.o.)  MRN: [id number]  CODE STATUS: Full Code    Date of Service: 2020    Patient Diagnosis(es): Thoracic compression fracture, closed, initial encounter [facility]) [S22.000A]   Chief Complaint   Patient presents with    Other     pt sent from MRI for tx      Patient Active Problem List    Diagnosis Date Noted    Myelopathy of thoracic region 2020   Shea Oconnor Thoracic compression fracture, closed, initial encounter ([facility] [address] 2020    Crush fracture of vertebra due to osteoporosis ([facility] [address] 2020    Paraparesis ([facility] [address] 2020    Acquired spondylolisthesis of thoracic spine 2020    Spinal stenosis, thoracic 2020    Actinic dermatitis     Acne rosacea     Dermatitis     Hypertension     Osteopenia     Anxiety         Past Medical History:   Diagnosis Date    Acne rosacea     Actinic dermatitis     biopsy proven 2018    Anxiety     Dermatitis     Hypertension     Osteopenia      Past Surgical History:   Procedure Laterality Date    CATARACT REMOVAL      bilateral       Restrictions  Restrictions/Precautions: Fall Risk  Position Activity Restriction  Spinal Precautions: No Bending; No Lifting; No Twisting    SUBJECTIVE   General  Chart Reviewed: Yes  Family / Caregiver Present: No  Subjective  Subjective: \"I was having spasms this morning but feel better now. \"    Pre-Session Pain Report  Pre Treatment Pain Screening  Pain at present: 3  Scale Used: Numeric Score  Intervention List: Patient able to continue with treatment  Pain Screening  Patient Currently in Pain: Yes       Post-Session Pain Report  Pain Assessment  Pain Assessment: 0-10  Pain Level: 4  Pain Type: Surgical pain  Pain Location: Back         OBJECTIVE        Bed mobility  Rolling to Right: Contact guard assistance  Supine to Sit: Moderate assistance  Sit to Supine:  Moderate assistance  Comment: educated through log roll, good carryover noted. Transfers  Sit to Stand: (pt unwilling to attempt d/t discomfort when sitting EOB. )                   Neuromuscular Education  Neuromuscular Comments: sitting EOB posture training for improved ability to maintain midline. Activity Tolerance  Activity Tolerance: Patient Tolerated treatment well          ASSESSMENT   Assessment: pt did not c/o dizziness this date but was in too much pain to attempt transfers this date. Discharge Recommendations:  Continue to assess pending progress    Goals  Long term goals  Long term goal 1: Patient will be SBA with bed mobility. Long term goal 2: Patient will be SBA with transfers. Long term goal 3: Patient will ambulate 100 ft using LRD with SBA. Long term goal 4: Patient will ascend/descend 4 steps using 2 HRs with CGA. PLAN    Times per week: 3-6  Safety Devices  Type of devices: All fall risk precautions in place, Bed alarm in place, Call light within reach     [facility] (6 CLICK) Paula 95 Raw Score : 9      Therapy Time   Individual   Time In 1128   Time Out 1151   Minutes 23      BM: 15  NM: XANDER Farias, 08/12/20 at 11:59 AM         Definitions for assistance levels  Independent = pt does not require any physical supervision or assistance from another person for activity completion. Device may be needed.   Stand by assistance = pt requires verbal cues or instructions from another person, close to but not touching, to perform the activity  Minimal assistance= pt performs 75% or more of the activity; assistance is required to complete the activity  Moderate assistance= pt performs 50% of the activity; assistance is required to complete the activity  Maximal assistance = pt performs 25% of the activity; assistance is required to complete the activity  Dependent = pt requires total physical assistance to accomplish the task

## 2020-08-12 NOTE — PROGRESS NOTES
Received note from pathology that the biopsy of the T8 fracture shows malignancy. I discussed these results with the patient and her daughter who is present. Will obtain oncology consult. We will plan anterior support at T8 with osteo cool and vertebral augmentation after she recovers more from this initial operation. (Osteo-cool is radiofrequency probe that is placed through a similar needle used for vertebral augmentation with the effect of destroying malignant cells followed by placement of bone cement for vertebral augmentation.)    Procedure indications and risks of the osteo-cool vertebral augmentation discussed. The risks are small but still present including something serious like even death paralysis sensory loss loss of bladder bowel control pain bleeding infection surgery is not a guarantee of normalcy we cannot undo any permanent damage already done cannot change the course of any of her other medical diseases. I discussed alternative procedures risks and benefits I answered their questions. They understand and agree to proceed.

## 2020-08-12 NOTE — FLOWSHEET NOTE
2090  Dr. Fleming Dies to bedside for evaluation. Patient questions answered by physician at this time. [id number]  Morning medications administered at this time. Patient is awake, alert, and oriented times 4 and is in the semi fowlers position in the bed. Patient denies any chest pain, shortness of breath, dizziness, weakness, nausea, or vomiting at this time. Patient complaining of abdominal pain and cramping and requested a muscle relaxer which was administered at this time. Family to bedside. Patient denies further needs at this time. 1125  Physical therapy to bedside. 1700  Dr. Fleming Dies back to bedside to update patient and family member.

## 2020-08-13 ENCOUNTER — APPOINTMENT (OUTPATIENT)
Dept: GENERAL RADIOLOGY | Age: 70
DRG: 457 | End: 2020-08-13
Payer: MEDICARE

## 2020-08-13 PROBLEM — C41.2: Status: ACTIVE | Noted: 2020-08-13

## 2020-08-13 LAB
ANION GAP SERPL CALCULATED.3IONS-SCNC: 8 MEQ/L (ref 9–15)
BASOPHILS ABSOLUTE: 0 K/UL (ref 0–0.2)
BASOPHILS RELATIVE PERCENT: 0.4 %
BUN BLDV-MCNC: 10 MG/DL (ref 8–23)
CALCIUM SERPL-MCNC: 8.4 MG/DL (ref 8.5–9.9)
CHLORIDE BLD-SCNC: 106 MEQ/L (ref 95–107)
CO2: 23 MEQ/L (ref 20–31)
CREAT SERPL-MCNC: 0.52 MG/DL (ref 0.5–0.9)
EOSINOPHILS ABSOLUTE: 0.1 K/UL (ref 0–0.7)
EOSINOPHILS RELATIVE PERCENT: 1.2 %
GFR AFRICAN AMERICAN: >60
GFR NON-AFRICAN AMERICAN: >60
GLUCOSE BLD-MCNC: 109 MG/DL (ref 70–99)
HCT VFR BLD CALC: 26.8 % (ref 37–47)
HEMOGLOBIN: 9.1 G/DL (ref 12–16)
LYMPHOCYTES ABSOLUTE: 1 K/UL (ref 1–4.8)
LYMPHOCYTES RELATIVE PERCENT: 11.6 %
MCH RBC QN AUTO: 34.2 PG (ref 27–31.3)
MCHC RBC AUTO-ENTMCNC: 34 % (ref 33–37)
MCV RBC AUTO: 100.4 FL (ref 82–100)
MONOCYTES ABSOLUTE: 0.9 K/UL (ref 0.2–0.8)
MONOCYTES RELATIVE PERCENT: 9.6 %
NEUTROPHILS ABSOLUTE: 7 K/UL (ref 1.4–6.5)
NEUTROPHILS RELATIVE PERCENT: 77.2 %
PDW BLD-RTO: 13.5 % (ref 11.5–14.5)
PLATELET # BLD: 211 K/UL (ref 130–400)
POTASSIUM REFLEX MAGNESIUM: 4.2 MEQ/L (ref 3.4–4.9)
RBC # BLD: 2.67 M/UL (ref 4.2–5.4)
SODIUM BLD-SCNC: 137 MEQ/L (ref 135–144)
WBC # BLD: 9 K/UL (ref 4.8–10.8)

## 2020-08-13 PROCEDURE — 6360000002 HC RX W HCPCS: Performed by: NEUROLOGICAL SURGERY

## 2020-08-13 PROCEDURE — 6370000000 HC RX 637 (ALT 250 FOR IP): Performed by: NEUROLOGICAL SURGERY

## 2020-08-13 PROCEDURE — 80048 BASIC METABOLIC PNL TOTAL CA: CPT

## 2020-08-13 PROCEDURE — 85025 COMPLETE CBC W/AUTO DIFF WBC: CPT

## 2020-08-13 PROCEDURE — 72072 X-RAY EXAM THORAC SPINE 3VWS: CPT

## 2020-08-13 PROCEDURE — U0003 INFECTIOUS AGENT DETECTION BY NUCLEIC ACID (DNA OR RNA); SEVERE ACUTE RESPIRATORY SYNDROME CORONAVIRUS 2 (SARS-COV-2) (CORONAVIRUS DISEASE [COVID-19]), AMPLIFIED PROBE TECHNIQUE, MAKING USE OF HIGH THROUGHPUT TECHNOLOGIES AS DESCRIBED BY CMS-2020-01-R: HCPCS

## 2020-08-13 PROCEDURE — 1210000000 HC MED SURG R&B

## 2020-08-13 PROCEDURE — 6370000000 HC RX 637 (ALT 250 FOR IP): Performed by: NURSE PRACTITIONER

## 2020-08-13 PROCEDURE — 36415 COLL VENOUS BLD VENIPUNCTURE: CPT

## 2020-08-13 RX ADMIN — TIZANIDINE 4 MG: 4 TABLET ORAL at 21:30

## 2020-08-13 RX ADMIN — TIZANIDINE 4 MG: 4 TABLET ORAL at 13:54

## 2020-08-13 RX ADMIN — BISACODYL 5 MG: 5 TABLET, COATED ORAL at 09:53

## 2020-08-13 RX ADMIN — HYDROMORPHONE HYDROCHLORIDE 0.5 MG: 1 INJECTION, SOLUTION INTRAMUSCULAR; INTRAVENOUS; SUBCUTANEOUS at 17:05

## 2020-08-13 RX ADMIN — OXYCODONE 5 MG: 5 TABLET ORAL at 19:12

## 2020-08-13 RX ADMIN — TIZANIDINE 4 MG: 4 TABLET ORAL at 07:34

## 2020-08-13 RX ADMIN — ACETAMINOPHEN 650 MG: 325 TABLET, FILM COATED ORAL at 17:05

## 2020-08-13 RX ADMIN — CALCIUM 500 MG: 500 TABLET ORAL at 09:55

## 2020-08-13 RX ADMIN — HYDROMORPHONE HYDROCHLORIDE 0.5 MG: 1 INJECTION, SOLUTION INTRAMUSCULAR; INTRAVENOUS; SUBCUTANEOUS at 04:20

## 2020-08-13 RX ADMIN — HYDROMORPHONE HYDROCHLORIDE 0.5 MG: 1 INJECTION, SOLUTION INTRAMUSCULAR; INTRAVENOUS; SUBCUTANEOUS at 21:30

## 2020-08-13 RX ADMIN — HYDROMORPHONE HYDROCHLORIDE 0.5 MG: 1 INJECTION, SOLUTION INTRAMUSCULAR; INTRAVENOUS; SUBCUTANEOUS at 10:39

## 2020-08-13 RX ADMIN — TRAMADOL HYDROCHLORIDE 50 MG: 50 TABLET, FILM COATED ORAL at 09:52

## 2020-08-13 ASSESSMENT — PAIN SCALES - GENERAL
PAINLEVEL_OUTOF10: 3
PAINLEVEL_OUTOF10: 5
PAINLEVEL_OUTOF10: 7
PAINLEVEL_OUTOF10: 9
PAINLEVEL_OUTOF10: 8
PAINLEVEL_OUTOF10: 5

## 2020-08-13 NOTE — PROGRESS NOTES
carbonate, tiZANidine, sodium chloride flush, acetaminophen **OR** acetaminophen, polyethylene glycol, promethazine **OR** ondansetron, HYDROmorphone, traMADol    Labs:   Recent Labs     08/10/20  0558 08/11/20  0607 08/12/20  0549   WBC 6.4 7.1 8.1   HGB 11.7* 9.8* 8.2*   HCT 33.5* 28.1* 24.2*    253 222     Recent Labs     08/10/20  0558 08/11/20  0607 08/12/20  0549    136 138   K 3.7 4.4 4.0    102 108*   CO2 22 26 24   BUN 16 13 14   CREATININE 0.57 0.61 0.56   CALCIUM 9.5 8.9 8.2*     No results for input(s): AST, ALT, BILIDIR, BILITOT, ALKPHOS in the last 72 hours. No results for input(s): INR in the last 72 hours. No results for input(s): Lisette Llanos in the last 72 hours. Urinalysis:   Lab Results   Component Value Date    NITRU POSITIVE 08/08/2020    WBCUA  08/08/2020    BACTERIA MANY 08/08/2020    RBCUA 10-20 08/08/2020    BLOODU SMALL 08/08/2020    SPECGRAV 1.018 08/08/2020    GLUCOSEU Negative 08/08/2020       Radiology:   Most recent    Chest CT      WITH CONTRAST:No results found for this or any previous visit. WITHOUT CONTRAST: No results found for this or any previous visit. CXR      2-view: No results found for this or any previous visit. Portable: No results found for this or any previous visit. Echo No results found for this or any previous visit.           Assessment/Plan:    Active Hospital Problems    Diagnosis Date Noted    Thoracic compression fracture, closed, initial encounter ([facility] [address] [S22.000A] 08/07/2020    Crush fracture of vertebra due to osteoporosis ([facility] [address] [M80.08XA] 08/07/2020    Paraparesis ([facility] [address] [G82.20] 08/07/2020    Acquired spondylolisthesis of thoracic spine [M43.14] 08/07/2020    Spinal stenosis, thoracic [M48.04] 08/07/2020     Thoracic compression fracture: Pain control with narcotics,  Neurosurgery consult in the ED with recommendation for surgical intervention    8/8 - planning for sx 8/10   8/9 - no significant change, poss sx 8/10.   8/10 - OR today, post op care   8/11 - pod 1, tolerating, cont plans for discharge/rehab   8/12 - pod 2, cont pain control, awaiting snf/rehab planning    HTN: Resume antihypertensive blood pressure monitoring     Pt can proceed with surgery with minimal surgical risk per  NSQIP calculator, patient has no  Pulmonary or cardiac compromise at this time.      DVT Prophylaxis:SCD for now, anticipating surgery  Diet: DIET GENERAL;  Code Status: Full Code      Electronically signed by Shweta Blevins MD on 8/12/2020 at 11:27 PM

## 2020-08-13 NOTE — CARE COORDINATION
Per Dr. Vera Saunders patient will need to start radiation/oncology on Wednesday 8/19 with Dr. Asif Culp and then he will follow up from there. The appointment previously made for Monday has been cancelled. Patient has been updated. Patient can no longer go to [facility], they will not be able to transport. List of SNF provided, she will advise. Patient will not require precert (Medicare). Albertina Burr.     Electronically signed by Charisse Gutierrez RN on 8/13/2020 at 1:00 PM

## 2020-08-13 NOTE — ONCOLOGY
Hematology/Oncology Consult  Encounter Date: 2020 12:51 PM    Ms. Tiarra Casillas is a 65 y.o. female  : 1950  MRN: [id number]  Acct Number: [de-identified]  Requesting Provider: Briana Frankel MD    Reason for request: myeloma      CONSULTANT: Erica Geronimo    HPI: Patient developed back pain starting in 2020. It progressed and MRI revealed a compression fracture at T8. She was admitted and had laminectomy, decompression, and fusion. Pathology confirmed \"sheets\" of plasma cells. Patient Active Problem List   Diagnosis    Hypertension    Osteopenia    Anxiety    Dermatitis    Acne rosacea    Actinic dermatitis    [facility]acic compression fracture, closed, initial encounter ([facility] [address]    Crush fracture of vertebra due to osteoporosis ([facility] [address]    Paraparesis ([facility] [address]    Acquired spondylolisthesis of thoracic spine    Spinal stenosis, thoracic    Myelopathy of thoracic region    Malignant neoplasm of thoracic vertebra [facility])     Past Medical History:   Diagnosis Date    Acne rosacea     Actinic dermatitis     biopsy proven 2018    Anxiety     Dermatitis     Hypertension     Osteopenia      @PSH@  Family History   Problem Relation Age of Onset    Diabetes Mother     Hypertension Mother     Hypertension Father     Breast Cancer Daughter      Social History     Socioeconomic History    Marital status:       Spouse name: Not on file    Number of children: Not on file    Years of education: Not on file    Highest education level: Not on file   Occupational History    Not on file   Social Needs    Financial resource strain: Not hard at all    Food insecurity     Worry: Never true     Inability: Never true   [employer] needs     Medical: No     Non-medical: No   Tobacco Use    Smoking status: Never Smoker    Smokeless tobacco: Never Used   Substance and Sexual Activity    Alcohol use: No     Alcohol/week: 0.0 standard drinks    Drug use: No    Sexual activity: Not on file Lifestyle    Physical activity     Days per week: Not on file     Minutes per session: Not on file    Stress: Not on file   Relationships    Social connections     Talks on phone: Not on file     Gets together: Not on file     Attends [employer] service: Not on file     Active member of club or organization: Not on file     Attends meetings of clubs or organizations: Not on file     Relationship status: Not on file    Intimate partner violence     Fear of current or ex partner: Not on file     Emotionally abused: Not on file     Physically abused: Not on file     Forced sexual activity: Not on file   Other Topics Concern    Not on file   Social History Narrative    Not on file          Current Facility-Administered Medications   Medication Dose Route Frequency Provider Last Rate Last Dose    melatonin tablet 5 mg  5 mg Oral Nightly PRN Kyle Johnson MD   5 mg at 08/12/20 2344    fentaNYL (DURAGESIC) 12 MCG/HR 1 patch  1 patch Transdermal Q72H Rosalinda Draper MD   1 patch at 08/12/20 0126    0.9 % sodium chloride infusion   Intravenous Continuous Rosalinda Draper  mL/hr at 08/12/20 2344      sodium chloride flush 0.9 % injection 10 mL  10 mL Intravenous 2 times per day Rosalinda Draper MD   10 mL at 08/12/20 2138    sodium chloride flush 0.9 % injection 10 mL  10 mL Intravenous PRN Rosalinda Draper MD        HYDROmorphone (DILAUDID) injection 0.25 mg  0.25 mg Intravenous Q3H PRN Rosalinda Draper MD        Or    HYDROmorphone (DILAUDID) injection 0.5 mg  0.5 mg Intravenous Q3H PRN Rosalinda Draper MD   0.5 mg at 08/13/20 1039    acetaminophen (TYLENOL) tablet 650 mg  650 mg Oral Q4H PRN Rosalinda Draper MD   650 mg at 08/12/20 2138    oxyCODONE (ROXICODONE) immediate release tablet 5 mg  5 mg Oral Q4H PRN Rosalinda Draper MD   5 mg at 08/11/20 0756    bisacodyl (DULCOLAX) EC tablet 5 mg  5 mg Oral Daily Rosalinda Draper MD   5 mg at 08/13/20 0953    ondansetron (ZOFRAN-ODT) disintegrating tablet 4 mg  4 mg Oral Q8H PRN Rosalinda Draper MD Or    ondansetron (ZOFRAN) injection 4 mg  4 mg Intravenous Q6H PRN Francisca Holman MD        calcium carbonate (TUMS) chewable tablet 500 mg  500 mg Oral TID PRN Lane Garrido MD   500 mg at 08/10/20 2329    tiZANidine (ZANAFLEX) tablet 4 mg  4 mg Oral Q6H PRN Francisca Holman MD   4 mg at 08/13/20 0734    cefTRIAXone (ROCEPHIN) 1 g IVPB in 50 mL D5W minibag  1 g Intravenous Q24H Venia Omar Sidhu DO   Stopped at 08/13/20 0014    sodium chloride flush 0.9 % injection 10 mL  10 mL Intravenous 2 times per day LADONNA Cordero CNP   10 mL at 08/10/20 0739    sodium chloride flush 0.9 % injection 10 mL  10 mL Intravenous PRN LADONNA Cordero - CNP   10 mL at 08/09/20 0232    acetaminophen (TYLENOL) tablet 650 mg  650 mg Oral Q6H PRN LADONNA Cordero CNP        Or    acetaminophen (TYLENOL) suppository 650 mg  650 mg Rectal Q6H PRN LADONNA Cordero - JENY        polyethylene glycol (GLYCOLAX) packet 17 g  17 g Oral Daily PRN LADONNA Cordero - JENY        promethazine (PHENERGAN) tablet 12.5 mg  12.5 mg Oral Q6H PRN LADONNA Cordero - JENY        Or    ondansetron (ZOFRAN) injection 4 mg  4 mg Intravenous Q6H PRN LADONNA Cordero - CNP   4 mg at 08/08/20 1716    HYDROmorphone (DILAUDID) injection 0.5 mg  0.5 mg Intravenous Q4H PRN LADONNA Cordero - CNP   0.5 mg at 08/10/20 1134    atenolol (TENORMIN) tablet 50 mg  50 mg Oral Daily LADONNA Cordero CNP   Stopped at 08/13/20 1018    calcium elemental (OSCAL) tablet 500 mg  500 mg Oral Daily LADONNA Cordero - CNP   500 mg at 08/13/20 0955    traMADol (ULTRAM) tablet 50 mg  50 mg Oral Q6H PRN LADONNA Cordero CNP   50 mg at 08/13/20 0178     [unfilled]  Allergies   Allergen Reactions    Codeine Rash        Review of Systems  Moderate back pain a little better every day. No nausea or emesis. No SOB. Decreased BM. Normal urination. No fever, chills, or night sweats. Otherwise ROS are negative.     PHYSICAL EXAMINATION:   VITAL SIGNS: BP (!) 118/55   Pulse 67   Temp 99 °F (37.2 °C)   Resp 18   Ht 5' 2\" (1.575 m)   Wt 157 lb (71.2 kg)   LMP  (LMP Unknown)   SpO2 100%   BMI 28.72 kg/m²     GENERAL: In mild distress, well- nourished, well- developed, alert and oriented to person place and time. SKIN: Warm and dry, withoutjaundice, ecchymoses, or petechiae. HEENT: Normocephalic, sclera anicteric. NODES: No palpable adenopathy in the neck Levels I-V, bilateral   Supraclavicular fossae, or axillary chains. LUNGS: Good inspiratory effort, no accessory muscle use, clear bilaterally, no focal wheeze, rales or rhonchi. CARDIAC: Regular rate and rhythm, without murmurs, rubs or gallops. ABDOMINAL: Normal bowel soundspresent, soft, non-tender, no mass or  organomegaly. MUSKL: Decreased ROM due to pain. EXTREMITIES:No edema  NEUROLOGIC: No grossly apparent focal deficits.     LAB RESULTS:  Recent Results (from the past 24 hour(s))   CBC auto differential    Collection Time: 08/13/20  5:28 AM   Result Value Ref Range    WBC 9.0 4.8 - 10.8 K/uL    RBC 2.67 (L) 4.20 - 5.40 M/uL    Hemoglobin 9.1 (L) 12.0 - 16.0 g/dL    Hematocrit 26.8 (L) 37.0 - 47.0 %    .4 (H) 82.0 - 100.0 fL    MCH 34.2 (H) 27.0 - 31.3 pg    MCHC 34.0 33.0 - 37.0 %    RDW 13.5 11.5 - 14.5 %    Platelets 445 651 - 240 K/uL    Neutrophils % 77.2 %    Lymphocytes % 11.6 %    Monocytes % 9.6 %    Eosinophils % 1.2 %    Basophils % 0.4 %    Neutrophils Absolute 7.0 (H) 1.4 - 6.5 K/uL    Lymphocytes Absolute 1.0 1.0 - 4.8 K/uL    Monocytes Absolute 0.9 (H) 0.2 - 0.8 K/uL    Eosinophils Absolute 0.1 0.0 - 0.7 K/uL    Basophils Absolute 0.0 0.0 - 0.2 K/uL   Basic Metabolic Panel w/ Reflex to MG    Collection Time: 08/13/20  5:28 AM   Result Value Ref Range    Sodium 137 135 - 144 mEq/L    Potassium reflex Magnesium 4.2 3.4 - 4.9 mEq/L    Chloride 106 95 - 107 mEq/L    CO2 23 20 - 31 mEq/L    Anion Gap 8 (L) 9 - 15 mEq/L    Glucose 109 (H) 70 - 99 HISTORY:  R20.0 Bilateral leg numbness ICD10 TECHNIQUE: Routine thoracic and lumbosacral spine MR protocol without and with intravenous gadolinium. CONTRAST: 15 ml of gadoteridol (PROHANCE) COMPARISON: Thoracic spine radiographs 6/26/2020. RESULT: Thoracic spine: Counting reference:  Lumbosacral junction. For the purposes of this report, L5-S1 is the last well-formed disc space. Alignment:  Mild kyphotic deformity secondary to the compression fracture. Cord:  Compression of the cord at the T8 level associated with the retropulsed fragments from the compression fracture as below. Signal intensity in the cord still appears within normal limits. Bone marrow signal/fracture:  Compression fracture of the T8 vertebral body, with decreased T1 signal and increased STIR signal, consistent with acute/subacute fracture. Around 50-60% height loss. Associated retropulsed fragment(s) with severe canal narrowing. No other fractures. No distinct underlying focal mass. Diffuse enhancement within the T8 vertebral body likely related to the fracture. Thoracic paraspinal soft tissues: The paraspinal soft tissues are within normal limits. Canal and foramina:  Severe cord narrowing at the T8 level as above secondary to the retropulsed ligaments and ligamentous hypertrophy. There is also severe bilateral foraminal narrowing at the T8-T9 level. Otherwise canal and foramina appear grossly patent. Lumbar spine: Counting reference:  Lumbosacral junction. For the purposes of this report, L5-S1 is considered the last well-formed disc space. 5 lumbar type vertebral bodies. Alignment:    Alignment is anatomic. Bone marrow signal/fracture:  No evidence of pathologic marrow infiltration. No evidence of prior fracture. Hemangioma within the superior endplate of L1. Conus: The conus is within normal limits of signal intensity and morphology. Paraspinal soft tissues:   Paraspinal soft tissues are within normal limits.  T12-L1:  No significant fracture. Cord:  Compression of the cord at the T8 level associated with the retropulsed fragments from the compression fracture as below. Signal intensity in the cord still appears within normal limits. Bone marrow signal/fracture:  Compression fracture of the T8 vertebral body, with decreased T1 signal and increased STIR signal, consistent with acute/subacute fracture. Around 50-60% height loss. Associated retropulsed fragment(s) with severe canal narrowing. No other fractures. No distinct underlying focal mass. Diffuse enhancement within the T8 vertebral body likely related to the fracture. Thoracic paraspinal soft tissues: The paraspinal soft tissues are within normal limits. Canal and foramina:  Severe cord narrowing at the T8 level as above secondary to the retropulsed ligaments and ligamentous hypertrophy. There is also severe bilateral foraminal narrowing at the T8-T9 level. Otherwise canal and foramina appear grossly patent. Lumbar spine: Counting reference:  Lumbosacral junction. For the purposes of this report, L5-S1 is considered the last well-formed disc space. 5 lumbar type vertebral bodies. Alignment:    Alignment is anatomic. Bone marrow signal/fracture:  No evidence of pathologic marrow infiltration. No evidence of prior fracture. Hemangioma within the superior endplate of L1. Conus: The conus is within normal limits of signal intensity and morphology. Paraspinal soft tissues:   Paraspinal soft tissues are within normal limits. T12-L1:  No significant canal or foraminal narrowing. L1-L2:    No significant canal or foraminal narrowing. L2-L3:    Broad-based disc bulge, disc height loss, with mild to moderate right foraminal narrowing and mild left foraminal narrowing, without significant canal narrowing. L3-L4:    Broad-based disc bulge, disc height loss, ligamentous hypertrophy with mild right foraminal narrowing without significant canal or left foraminal narrowing.  L4-L5:    Broad-based marrow biopsy, but can't due for now as positioning will be difficult. I don't think RFA to the spine changes her need for radiation or chemotherapy. Interestingly her sister also diagnosed with myeloma. I discussed concerns with the patient and her daughter today. Thanks.     Electronically signed by Enedina Torres DO on 8/13/2020 at 12:51 PM

## 2020-08-13 NOTE — PROGRESS NOTES
Physical Therapy Missed Treatment   Facility/Department: [facility] MED SURG F180/[id number]    NAME: Armida Rosas    : 1950 (65 y.o.)  MRN: [id number]    Account: [de-identified]  Gender: female    Patient and daughter present upon arrival. They request to hold therapy this date. No reason given. Will attempt PT treatment again at earliest convenience.         Electronically signed by Immanuel Eric PTA on 20 at 11:50 AM EDT

## 2020-08-13 NOTE — PROGRESS NOTES
Spiritual Care Services     Summary of Visit:  PT was groaning and turning with pains. She went for x-ray and came back with great discomfort. I called the nurse and informed her about PT's concerns. She came back with the pain medication. Meanwhile, he daughter was present. Together we prayed for PT and I anointed her. Spiritual Assessment/Intervention/Outcomes:    Encounter Summary  Services provided to[de-identified] Patient, Patient and family together  Referral/Consult From[de-identified] Rounding  Support System: Children  Place of Islam: None   Contact Mu-ism: No  Continue Visiting: Yes  Complexity of Encounter: High  Length of Encounter: 30 minutes  Spiritual Assessment Completed: Yes  Routine  Type:  Follow up  Assessment: Approachable, Calm  Intervention: Active listening, Explored feelings, thoughts, concerns  Outcome: Receptive, Expressed gratitude     Spiritual/Baptism  Type: Spiritual support  Assessment: Tearful, Anxious, Concerns with suffering  Intervention: Anointing, Prayer, Nurtured hope, Explored coping resources, Explored feelings, thoughts, concerns, Active listening  Outcome: Tearful, Expressed gratitude, Expressed feelings of bella, peace, and/or awe, Encouraged, Receptive, Hopeful, Engaged in conversation  Sacraments  Sacrament of Sick-Anointing: Anointed     Advance Directives (For Healthcare)  Pre-existing DNR Comfort Care/DNR Arrest/DNI Order: No  Healthcare Directive: No, patient does not have an advance directive for healthcare treatment  Information on Healthcare Directives Requested: No  Patient Requests Assistance: No  Advance Directives: Pt. not interested at this time           Values / Beliefs  Do you have any ethnic, cultural, sacramental, or spiritual Rastafarian needs you would like us to be aware of while you are in the hospital?: No    Care Plan:     visit    [id number] [address]   Electronically signed by Brenda Weiner on 8/13/20 at 10:48 AM EDT     To reach a  for emotional and spiritual support, place an [facility] consult request.   If a  is needed immediately, dial 0 and ask to page the on-call .

## 2020-08-13 NOTE — FLOWSHEET NOTE
Shift assessment complete. Patient denies cp, sob, n/v. Patient A&Ox4,VSS. Pt temp is 99.9, given tylenol per MAR. LS clear, even and unlabored. BS active in all 4 quadrants. Patient stated \"it's been a while since last BM\". Patient is c/o pain, medicated per MAR. Patient has no further needs at this time. Bed alarm on. Call light within reach. Will continue to monitor.

## 2020-08-14 ENCOUNTER — APPOINTMENT (OUTPATIENT)
Dept: NUCLEAR MEDICINE | Age: 70
DRG: 457 | End: 2020-08-14
Payer: MEDICARE

## 2020-08-14 LAB
ANION GAP SERPL CALCULATED.3IONS-SCNC: 7 MEQ/L (ref 9–15)
BASOPHILS ABSOLUTE: 0 K/UL (ref 0–0.2)
BASOPHILS RELATIVE PERCENT: 0.2 %
BUN BLDV-MCNC: 9 MG/DL (ref 8–23)
CALCIUM SERPL-MCNC: 8.4 MG/DL (ref 8.5–9.9)
CHLORIDE BLD-SCNC: 104 MEQ/L (ref 95–107)
CO2: 24 MEQ/L (ref 20–31)
CREAT SERPL-MCNC: 0.43 MG/DL (ref 0.5–0.9)
EOSINOPHILS ABSOLUTE: 0.2 K/UL (ref 0–0.7)
EOSINOPHILS RELATIVE PERCENT: 2.3 %
GFR AFRICAN AMERICAN: >60
GFR NON-AFRICAN AMERICAN: >60
GLUCOSE BLD-MCNC: 113 MG/DL (ref 70–99)
HCT VFR BLD CALC: 23.7 % (ref 37–47)
HEMOGLOBIN: 8.1 G/DL (ref 12–16)
LYMPHOCYTES ABSOLUTE: 1.1 K/UL (ref 1–4.8)
LYMPHOCYTES RELATIVE PERCENT: 12.1 %
MCH RBC QN AUTO: 33.8 PG (ref 27–31.3)
MCHC RBC AUTO-ENTMCNC: 34.2 % (ref 33–37)
MCV RBC AUTO: 98.9 FL (ref 82–100)
MONOCYTES ABSOLUTE: 0.7 K/UL (ref 0.2–0.8)
MONOCYTES RELATIVE PERCENT: 7.4 %
NEUTROPHILS ABSOLUTE: 7 K/UL (ref 1.4–6.5)
NEUTROPHILS RELATIVE PERCENT: 78 %
PDW BLD-RTO: 13.4 % (ref 11.5–14.5)
PLATELET # BLD: 209 K/UL (ref 130–400)
POTASSIUM REFLEX MAGNESIUM: 4.7 MEQ/L (ref 3.4–4.9)
RBC # BLD: 2.4 M/UL (ref 4.2–5.4)
SEDIMENTATION RATE, ERYTHROCYTE: 84 MM (ref 0–30)
SODIUM BLD-SCNC: 135 MEQ/L (ref 135–144)
WBC # BLD: 9 K/UL (ref 4.8–10.8)

## 2020-08-14 PROCEDURE — 2580000003 HC RX 258: Performed by: NURSE PRACTITIONER

## 2020-08-14 PROCEDURE — 84156 ASSAY OF PROTEIN URINE: CPT

## 2020-08-14 PROCEDURE — 3430000000 HC RX DIAGNOSTIC RADIOPHARMACEUTICAL: Performed by: INTERNAL MEDICINE

## 2020-08-14 PROCEDURE — 80048 BASIC METABOLIC PNL TOTAL CA: CPT

## 2020-08-14 PROCEDURE — 2580000003 HC RX 258: Performed by: INTERNAL MEDICINE

## 2020-08-14 PROCEDURE — 6360000002 HC RX W HCPCS: Performed by: INTERNAL MEDICINE

## 2020-08-14 PROCEDURE — 84165 PROTEIN E-PHORESIS SERUM: CPT

## 2020-08-14 PROCEDURE — 6360000002 HC RX W HCPCS: Performed by: NURSE PRACTITIONER

## 2020-08-14 PROCEDURE — 83520 IMMUNOASSAY QUANT NOS NONAB: CPT

## 2020-08-14 PROCEDURE — 2580000003 HC RX 258: Performed by: NEUROLOGICAL SURGERY

## 2020-08-14 PROCEDURE — 84155 ASSAY OF PROTEIN SERUM: CPT

## 2020-08-14 PROCEDURE — 85025 COMPLETE CBC W/AUTO DIFF WBC: CPT

## 2020-08-14 PROCEDURE — 86335 IMMUNFIX E-PHORSIS/URINE/CSF: CPT

## 2020-08-14 PROCEDURE — 6370000000 HC RX 637 (ALT 250 FOR IP): Performed by: INTERNAL MEDICINE

## 2020-08-14 PROCEDURE — A9503 TC99M MEDRONATE: HCPCS | Performed by: INTERNAL MEDICINE

## 2020-08-14 PROCEDURE — 6370000000 HC RX 637 (ALT 250 FOR IP): Performed by: NURSE PRACTITIONER

## 2020-08-14 PROCEDURE — 1210000000 HC MED SURG R&B

## 2020-08-14 PROCEDURE — 82232 ASSAY OF BETA-2 PROTEIN: CPT

## 2020-08-14 PROCEDURE — 82784 ASSAY IGA/IGD/IGG/IGM EACH: CPT

## 2020-08-14 PROCEDURE — 51702 INSERT TEMP BLADDER CATH: CPT

## 2020-08-14 PROCEDURE — 78306 BONE IMAGING WHOLE BODY: CPT

## 2020-08-14 PROCEDURE — 85652 RBC SED RATE AUTOMATED: CPT

## 2020-08-14 PROCEDURE — 6370000000 HC RX 637 (ALT 250 FOR IP): Performed by: NEUROLOGICAL SURGERY

## 2020-08-14 PROCEDURE — 6360000002 HC RX W HCPCS: Performed by: NEUROLOGICAL SURGERY

## 2020-08-14 PROCEDURE — 36415 COLL VENOUS BLD VENIPUNCTURE: CPT

## 2020-08-14 RX ORDER — TC 99M MEDRONATE 20 MG/10ML
25 INJECTION, POWDER, LYOPHILIZED, FOR SOLUTION INTRAVENOUS
Status: COMPLETED | OUTPATIENT
Start: 2020-08-14 | End: 2020-08-14

## 2020-08-14 RX ORDER — SODIUM CHLORIDE 0.9 % (FLUSH) 0.9 %
10 SYRINGE (ML) INJECTION PRN
Status: DISCONTINUED | OUTPATIENT
Start: 2020-08-14 | End: 2020-08-16 | Stop reason: HOSPADM

## 2020-08-14 RX ADMIN — CALCIUM 500 MG: 500 TABLET ORAL at 11:40

## 2020-08-14 RX ADMIN — HYDROMORPHONE HYDROCHLORIDE 0.5 MG: 1 INJECTION, SOLUTION INTRAMUSCULAR; INTRAVENOUS; SUBCUTANEOUS at 12:44

## 2020-08-14 RX ADMIN — Medication 10 ML: at 22:55

## 2020-08-14 RX ADMIN — Medication 10 ML: at 21:34

## 2020-08-14 RX ADMIN — OXYCODONE 5 MG: 5 TABLET ORAL at 10:25

## 2020-08-14 RX ADMIN — ATENOLOL 50 MG: 25 TABLET ORAL at 11:39

## 2020-08-14 RX ADMIN — TIZANIDINE 4 MG: 4 TABLET ORAL at 21:42

## 2020-08-14 RX ADMIN — OXYCODONE 5 MG: 5 TABLET ORAL at 21:42

## 2020-08-14 RX ADMIN — HYDROMORPHONE HYDROCHLORIDE 0.5 MG: 1 INJECTION, SOLUTION INTRAMUSCULAR; INTRAVENOUS; SUBCUTANEOUS at 03:43

## 2020-08-14 RX ADMIN — CEFTRIAXONE SODIUM 1 G: 1 INJECTION, POWDER, FOR SOLUTION INTRAMUSCULAR; INTRAVENOUS at 00:19

## 2020-08-14 RX ADMIN — SODIUM CHLORIDE: 9 INJECTION, SOLUTION INTRAVENOUS at 00:21

## 2020-08-14 RX ADMIN — HYDROMORPHONE HYDROCHLORIDE 0.5 MG: 1 INJECTION, SOLUTION INTRAMUSCULAR; INTRAVENOUS; SUBCUTANEOUS at 07:58

## 2020-08-14 RX ADMIN — HYDROMORPHONE HYDROCHLORIDE 0.5 MG: 1 INJECTION, SOLUTION INTRAMUSCULAR; INTRAVENOUS; SUBCUTANEOUS at 18:39

## 2020-08-14 RX ADMIN — BISACODYL 5 MG: 5 TABLET, COATED ORAL at 16:01

## 2020-08-14 RX ADMIN — OXYCODONE 5 MG: 5 TABLET ORAL at 15:49

## 2020-08-14 RX ADMIN — TIZANIDINE 4 MG: 4 TABLET ORAL at 10:24

## 2020-08-14 RX ADMIN — TC 99M MEDRONATE 28.3 MILLICURIE: 20 INJECTION, POWDER, LYOPHILIZED, FOR SOLUTION INTRAVENOUS at 09:10

## 2020-08-14 RX ADMIN — OXYCODONE 5 MG: 5 TABLET ORAL at 06:25

## 2020-08-14 RX ADMIN — ACETAMINOPHEN 650 MG: 325 TABLET, FILM COATED ORAL at 15:49

## 2020-08-14 RX ADMIN — Medication 10 ML: at 09:10

## 2020-08-14 RX ADMIN — TIZANIDINE 4 MG: 4 TABLET ORAL at 03:43

## 2020-08-14 RX ADMIN — OXYCODONE 5 MG: 5 TABLET ORAL at 00:19

## 2020-08-14 RX ADMIN — HYDROMORPHONE HYDROCHLORIDE 0.25 MG: 1 INJECTION, SOLUTION INTRAMUSCULAR; INTRAVENOUS; SUBCUTANEOUS at 22:54

## 2020-08-14 ASSESSMENT — PAIN DESCRIPTION - LOCATION
LOCATION: BACK
LOCATION: ABDOMEN;BACK

## 2020-08-14 ASSESSMENT — PAIN SCALES - GENERAL
PAINLEVEL_OUTOF10: 4
PAINLEVEL_OUTOF10: 8
PAINLEVEL_OUTOF10: 7
PAINLEVEL_OUTOF10: 4
PAINLEVEL_OUTOF10: 10
PAINLEVEL_OUTOF10: 5
PAINLEVEL_OUTOF10: 3
PAINLEVEL_OUTOF10: 6
PAINLEVEL_OUTOF10: 10
PAINLEVEL_OUTOF10: 6
PAINLEVEL_OUTOF10: 3
PAINLEVEL_OUTOF10: 6
PAINLEVEL_OUTOF10: 4
PAINLEVEL_OUTOF10: 4
PAINLEVEL_OUTOF10: 6
PAINLEVEL_OUTOF10: 4
PAINLEVEL_OUTOF10: 6

## 2020-08-14 ASSESSMENT — PAIN DESCRIPTION - PAIN TYPE
TYPE: SURGICAL PAIN;ACUTE PAIN
TYPE: SURGICAL PAIN

## 2020-08-14 ASSESSMENT — PAIN DESCRIPTION - DESCRIPTORS: DESCRIPTORS: THROBBING;ACHING

## 2020-08-14 ASSESSMENT — PAIN DESCRIPTION - FREQUENCY: FREQUENCY: CONTINUOUS

## 2020-08-14 NOTE — CARE COORDINATION
Patient has been accepted at [facility] and they will be able to provide transport to and from [facility]. A COVID test will be needed and they can accept over the weekend. Electronically signed by Karson Ojeda RN on 8/14/2020 at 1:31 PM    Met with patient and daughter at the bedside. She selected [facility] as first choice and [facility] as 2nd choice. Call placed to [facility] at [facility]. They will review and call back later today. Patient will also require transportation to and from radiation therapy at the [facility]. Per [facility] the family may have to absorb some of the cost if Medicare does not cover. Isael KAPADIA updated and will complete the [id number].   Electronically signed by Karson Ojeda RN on 8/14/2020 at 11:41 AM

## 2020-08-14 NOTE — PROGRESS NOTES
X-ray from yesterday shows stability of the construct. I am planning osteo-cool and vertebral augmentation at T8 within this next week or 2. Pathology shows plasmacytosis. This may or may not be multiple myeloma. Appreciate evaluation and treatment by Dr. Juan Carlos Klein oncology.

## 2020-08-14 NOTE — PROGRESS NOTES
Physical Therapy Missed Treatment   Facility/Department: [facility] MED SURG U812/[id number]    NAME: Sridhar Durham    : 1950 (65 y.o.)  MRN: [id number]    Account: [de-identified]  Gender: female      Patient off floor at nuclear medicine in pm.      Will follow and attempt PT evaluation again at earliest availability.        Perez Daigle, PT, 20 at 1:15 PM

## 2020-08-14 NOTE — CARE COORDINATION
Social work note: Janey Sousa RN arranged DC plans for patient, asked LSW to complete [id number] for this patient. [id number] complete.  Electronically signed by KANG Loyola on 8/14/2020 at 2:53 PM

## 2020-08-15 LAB
ANION GAP SERPL CALCULATED.3IONS-SCNC: 9 MEQ/L (ref 9–15)
BASOPHILS ABSOLUTE: 0 K/UL (ref 0–0.2)
BASOPHILS RELATIVE PERCENT: 0.4 %
BETA-2 MICROGLOBULIN: 2.3 MG/L (ref 1.1–2.4)
BUN BLDV-MCNC: 10 MG/DL (ref 8–23)
CALCIUM SERPL-MCNC: 8.1 MG/DL (ref 8.5–9.9)
CHLORIDE BLD-SCNC: 98 MEQ/L (ref 95–107)
CO2: 24 MEQ/L (ref 20–31)
CREAT SERPL-MCNC: 0.43 MG/DL (ref 0.5–0.9)
EOSINOPHILS ABSOLUTE: 0.2 K/UL (ref 0–0.7)
EOSINOPHILS RELATIVE PERCENT: 3 %
GFR AFRICAN AMERICAN: >60
GFR NON-AFRICAN AMERICAN: >60
GLUCOSE BLD-MCNC: 104 MG/DL (ref 70–99)
HCT VFR BLD CALC: 23 % (ref 37–47)
HEMOGLOBIN: 7.9 G/DL (ref 12–16)
LYMPHOCYTES ABSOLUTE: 0.8 K/UL (ref 1–4.8)
LYMPHOCYTES RELATIVE PERCENT: 9.6 %
MCH RBC QN AUTO: 33.5 PG (ref 27–31.3)
MCHC RBC AUTO-ENTMCNC: 34.5 % (ref 33–37)
MCV RBC AUTO: 97.3 FL (ref 82–100)
MONOCYTES ABSOLUTE: 0.7 K/UL (ref 0.2–0.8)
MONOCYTES RELATIVE PERCENT: 8.8 %
NEUTROPHILS ABSOLUTE: 6.2 K/UL (ref 1.4–6.5)
NEUTROPHILS RELATIVE PERCENT: 78.2 %
PDW BLD-RTO: 12.7 % (ref 11.5–14.5)
PLATELET # BLD: 227 K/UL (ref 130–400)
POTASSIUM REFLEX MAGNESIUM: 3.7 MEQ/L (ref 3.4–4.9)
RBC # BLD: 2.36 M/UL (ref 4.2–5.4)
SODIUM BLD-SCNC: 131 MEQ/L (ref 135–144)
WBC # BLD: 7.9 K/UL (ref 4.8–10.8)

## 2020-08-15 PROCEDURE — 1210000000 HC MED SURG R&B

## 2020-08-15 PROCEDURE — 80048 BASIC METABOLIC PNL TOTAL CA: CPT

## 2020-08-15 PROCEDURE — 36415 COLL VENOUS BLD VENIPUNCTURE: CPT

## 2020-08-15 PROCEDURE — 2580000003 HC RX 258: Performed by: NURSE PRACTITIONER

## 2020-08-15 PROCEDURE — 2580000003 HC RX 258: Performed by: INTERNAL MEDICINE

## 2020-08-15 PROCEDURE — 2580000003 HC RX 258: Performed by: NEUROLOGICAL SURGERY

## 2020-08-15 PROCEDURE — 6370000000 HC RX 637 (ALT 250 FOR IP): Performed by: NEUROLOGICAL SURGERY

## 2020-08-15 PROCEDURE — 6370000000 HC RX 637 (ALT 250 FOR IP): Performed by: NURSE PRACTITIONER

## 2020-08-15 PROCEDURE — 85025 COMPLETE CBC W/AUTO DIFF WBC: CPT

## 2020-08-15 PROCEDURE — 97535 SELF CARE MNGMENT TRAINING: CPT

## 2020-08-15 PROCEDURE — 6360000002 HC RX W HCPCS: Performed by: INTERNAL MEDICINE

## 2020-08-15 PROCEDURE — 6370000000 HC RX 637 (ALT 250 FOR IP): Performed by: INTERNAL MEDICINE

## 2020-08-15 PROCEDURE — 6360000002 HC RX W HCPCS: Performed by: NURSE PRACTITIONER

## 2020-08-15 RX ADMIN — CEFTRIAXONE SODIUM 1 G: 1 INJECTION, POWDER, FOR SOLUTION INTRAMUSCULAR; INTRAVENOUS at 23:12

## 2020-08-15 RX ADMIN — TIZANIDINE 4 MG: 4 TABLET ORAL at 08:51

## 2020-08-15 RX ADMIN — TRAMADOL HYDROCHLORIDE 50 MG: 50 TABLET, FILM COATED ORAL at 22:31

## 2020-08-15 RX ADMIN — BISACODYL 5 MG: 5 TABLET, COATED ORAL at 08:51

## 2020-08-15 RX ADMIN — ACETAMINOPHEN 650 MG: 325 TABLET, FILM COATED ORAL at 22:30

## 2020-08-15 RX ADMIN — HYDROMORPHONE HYDROCHLORIDE 0.5 MG: 1 INJECTION, SOLUTION INTRAMUSCULAR; INTRAVENOUS; SUBCUTANEOUS at 12:12

## 2020-08-15 RX ADMIN — POLYETHYLENE GLYCOL 3350 17 G: 17 POWDER, FOR SOLUTION ORAL at 18:31

## 2020-08-15 RX ADMIN — ATENOLOL 50 MG: 25 TABLET ORAL at 08:51

## 2020-08-15 RX ADMIN — TIZANIDINE 4 MG: 4 TABLET ORAL at 22:30

## 2020-08-15 RX ADMIN — OXYCODONE 5 MG: 5 TABLET ORAL at 08:51

## 2020-08-15 RX ADMIN — Medication 10 ML: at 00:32

## 2020-08-15 RX ADMIN — CALCIUM 500 MG: 500 TABLET ORAL at 08:51

## 2020-08-15 RX ADMIN — HYDROMORPHONE HYDROCHLORIDE 0.5 MG: 1 INJECTION, SOLUTION INTRAMUSCULAR; INTRAVENOUS; SUBCUTANEOUS at 16:24

## 2020-08-15 RX ADMIN — Medication 10 ML: at 08:57

## 2020-08-15 RX ADMIN — MELATONIN 5 MG: at 22:30

## 2020-08-15 RX ADMIN — TIZANIDINE 4 MG: 4 TABLET ORAL at 03:40

## 2020-08-15 RX ADMIN — OXYCODONE 5 MG: 5 TABLET ORAL at 14:46

## 2020-08-15 RX ADMIN — OXYCODONE 5 MG: 5 TABLET ORAL at 03:22

## 2020-08-15 RX ADMIN — Medication 10 ML: at 22:35

## 2020-08-15 RX ADMIN — CEFTRIAXONE SODIUM 1 G: 1 INJECTION, POWDER, FOR SOLUTION INTRAMUSCULAR; INTRAVENOUS at 00:32

## 2020-08-15 RX ADMIN — Medication 10 ML: at 22:32

## 2020-08-15 ASSESSMENT — PAIN DESCRIPTION - ONSET: ONSET: ON-GOING

## 2020-08-15 ASSESSMENT — PAIN DESCRIPTION - PROGRESSION: CLINICAL_PROGRESSION: NOT CHANGED

## 2020-08-15 ASSESSMENT — PAIN DESCRIPTION - DESCRIPTORS: DESCRIPTORS: ACHING

## 2020-08-15 ASSESSMENT — PAIN SCALES - GENERAL
PAINLEVEL_OUTOF10: 7
PAINLEVEL_OUTOF10: 7
PAINLEVEL_OUTOF10: 4
PAINLEVEL_OUTOF10: 6
PAINLEVEL_OUTOF10: 4
PAINLEVEL_OUTOF10: 6
PAINLEVEL_OUTOF10: 7
PAINLEVEL_OUTOF10: 7
PAINLEVEL_OUTOF10: 2

## 2020-08-15 ASSESSMENT — PAIN DESCRIPTION - FREQUENCY: FREQUENCY: CONTINUOUS

## 2020-08-15 ASSESSMENT — PAIN DESCRIPTION - ORIENTATION: ORIENTATION: MID;UPPER

## 2020-08-15 ASSESSMENT — PAIN DESCRIPTION - LOCATION: LOCATION: BACK;INCISION

## 2020-08-15 ASSESSMENT — PAIN DESCRIPTION - PAIN TYPE: TYPE: ACUTE PAIN;SURGICAL PAIN

## 2020-08-15 NOTE — PROGRESS NOTES
flush, acetaminophen **OR** acetaminophen, polyethylene glycol, promethazine **OR** ondansetron, HYDROmorphone, traMADol    Labs:   Recent Labs     08/12/20  0549 08/13/20  0528 08/14/20  0608   WBC 8.1 9.0 9.0   HGB 8.2* 9.1* 8.1*   HCT 24.2* 26.8* 23.7*    211 209     Recent Labs     08/12/20  0549 08/13/20  0528 08/14/20  0608    137 135   K 4.0 4.2 4.7   * 106 104   CO2 24 23 24   BUN 14 10 9   CREATININE 0.56 0.52 0.43*   CALCIUM 8.2* 8.4* 8.4*     No results for input(s): AST, ALT, BILIDIR, BILITOT, ALKPHOS in the last 72 hours. No results for input(s): INR in the last 72 hours. No results for input(s): Elisabet Farm in the last 72 hours. Urinalysis:   Lab Results   Component Value Date    NITRU POSITIVE 08/08/2020    WBCUA  08/08/2020    BACTERIA MANY 08/08/2020    RBCUA 10-20 08/08/2020    BLOODU SMALL 08/08/2020    SPECGRAV 1.018 08/08/2020    GLUCOSEU Negative 08/08/2020       Radiology:   Most recent    Chest CT      WITH CONTRAST:No results found for this or any previous visit. WITHOUT CONTRAST: No results found for this or any previous visit. CXR      2-view: No results found for this or any previous visit. Portable: No results found for this or any previous visit. Echo No results found for this or any previous visit.           Assessment/Plan:    Active Hospital Problems    Diagnosis Date Noted    Malignant neoplasm of thoracic vertebra ([facility] [C41.2] 08/13/2020    Thoracic compression fracture, closed, initial encounter ([facility] [S22.000A] 08/07/2020    Paraparesis ([facility] [G82.20] 08/07/2020    Acquired spondylolisthesis of thoracic spine [M43.14] 08/07/2020    Spinal stenosis, thoracic [M48.04] 08/07/2020     Thoracic compression fracture: Pain control with narcotics,  Neurosurgery consult in the ED with recommendation for surgical intervention    8/8 - planning for sx 8/10   8/9 - no significant change, poss sx 8/10.   8/10 - OR today, post op care   8/11 - pod 1, tolerating, cont plans for discharge/rehab   8/12 - pod 2, cont pain control, awaiting snf/rehab planning   8/13 - pod 3, oncology following, awaiting plans for diagnosis and treatment   8/14 - pod 4, for whole body bone scan today    HTN: Resume antihypertensive blood pressure monitoring       DVT Prophylaxis:SCD for now, anticipating surgery  Diet: DIET GENERAL;  Code Status: Full Code      Electronically signed by Ildefonso Reno MD on 8/15/2020 at 1:27 AM

## 2020-08-15 NOTE — CARE COORDINATION
[facility] AT [address] [place] [address]. CLARIFIED WITH [facility] THE COVID TEST AND RESULTS MUST BE WITHIN 48HRS.   PT WILL NEED RAPID COVID TEST PRIOR TO DC.

## 2020-08-15 NOTE — PROGRESS NOTES
Physical Therapy Med Surg Daily Treatment Note  Facility/Department: [facility]  [facility]       NAME: Chriss Jamison  : 1950 (65 y.o.)  MRN: [id number]  CODE STATUS: Full Code    Date of Service: 8/15/2020    Patient Diagnosis(es): Thoracic compression fracture, closed, initial encounter [facility]) [S22.000A]   Chief Complaint   Patient presents with    Other     pt sent from MRI for tx      Patient Active Problem List    Diagnosis Date Noted    Malignant neoplasm of thoracic vertebra ([facility] 2020    Myelopathy of thoracic region 2020    [facility]    Crush fracture of vertebra due to osteoporosis ([facility] 2020    Paraparesis ([facility] 2020    Acquired spondylolisthesis of thoracic spine 2020    Spinal stenosis, thoracic 2020    Actinic dermatitis     Acne rosacea     Dermatitis     Hypertension     Osteopenia     Anxiety         Past Medical History:   Diagnosis Date    Acne rosacea     Actinic dermatitis     biopsy proven 2018    Anxiety     Dermatitis     Hypertension     Osteopenia      Past Surgical History:   Procedure Laterality Date    CATARACT REMOVAL      bilateral    LUMBAR FUSION N/A 8/10/2020    T7-8-9  DECOMPRESSION POSTEROLATERAL FUSION PEDICLE SCREWS T6-7-10-11 performed by Francisca Holman MD at [address] [place] [address]  Restrictions/Precautions: Fall Risk  Position Activity Restriction  Spinal Precautions: No Bending; No Lifting; No Twisting    SUBJECTIVE   General  Chart Reviewed: Yes  Family / Caregiver Present: No  Subjective  Subjective: i have pain whenever i move around but i think its getting better    Pre-Session Pain Report     Pain Screening  Patient Currently in Pain: Yes  Pre Treatment Pain Screening  Pain at present: 7  Scale Used: Numeric Score  Intervention List: Patient able to continue with treatment;Nurse called to administer meds    Post-Session Pain Report  Pain Assessment  Pain Assessment: 0-10  Pain Level: 7         OBJECTIVE        Bed mobility  Rolling to Left: Contact guard assistance  Rolling to Right: Contact guard assistance  Supine to Sit: Moderate assistance(assistance with BLE needed)  Sit to Supine: Moderate assistance(assistance with BLE needed)  Scooting: Moderate assistance  Comment: pt performed log roll multiple times during bed mobility this session with good tech and awareness of her precautions. hand rails were used. increased time and effort needed to peform. VC for breathing techniques for pain relief utilized. Transfers  Sit to Stand: Unable to assess  Stand to sit: Unable to assess  Comment: unable to perform STS transfer as pt sat EOB for most of tx and during this time she c/o pain in her abdomen and c/o feeling light headed and she requested to lay back down. Ambulation  Ambulation?: No(unable due to increased pain while sitting EOB and c/o feeling light headed)                    Other exercises  Other exercises?: Yes  Other exercises 1: static EOB sitting with focus on upright posture and limiting posterior lean  Other exercises 2: EOB sitting with attempted LAQ -pt unable to perform due to pain                               ASSESSMENT     While pt was sitting EOB she presented with a heavy posterior lean which was due to pain she experienced with upright posture. Pt was unable to attempt any transfers or gait this session due to increased pain following bed mobility and c/o feeling light headed. Discharge Recommendations:  Continue to assess pending progress    Goals  Long term goals  Long term goal 1: Patient will be SBA with bed mobility. Long term goal 2: Patient will be SBA with transfers. Long term goal 3: Patient will ambulate 100 ft using LRD with SBA. Long term goal 4: Patient will ascend/descend 4 steps using 2 HRs with CGA. PLAN    Times per week: 3-6  Safety Devices  Type of devices:  All fall risk precautions in place, Bed alarm in place, Call light within reach     [facility] (6 CLICK) Paula 95 Raw Score : 9     Therapy Time   Individual   Time In 0825   Time Out 0900   Minutes 35      bm/transfer:28  thereex:7       Stephanie Sanchez PTA, 08/15/20 at 9:06 AM         Definitions for assistance levels  Independent = pt does not require any physical supervision or assistance from another person for activity completion. Device may be needed.   Stand by assistance = pt requires verbal cues or instructions from another person, close to but not touching, to perform the activity  Minimal assistance= pt performs 75% or more of the activity; assistance is required to complete the activity  Moderate assistance= pt performs 50% of the activity; assistance is required to complete the activity  Maximal assistance = pt performs 25% of the activity; assistance is required to complete the activity  Dependent = pt requires total physical assistance to accomplish the task

## 2020-08-15 NOTE — PROGRESS NOTES
28.7  · Adjusted Body Weight:  ; No Adjustment   · BMI Categories: Overweight (BMI 25.0-29. 9)       Nutrition Diagnosis:   · Inadequate oral intake related to acute injury/trauma as evidenced by intake 51-75%      Nutrition Interventions:   Food and/or Nutrient Delivery:  Continue Current Diet, Start Oral Nutrition Supplement(Carb Control 4 diet; will order diabetic ONS BID)  Nutrition Education/Counseling:  No recommendation at this time   Coordination of Nutrition Care:  Continued Inpatient Monitoring    Goals:  PO intake >75% meals/ONS. Stable weight ~157 lbs.  Blood glucose  mg/dL       Nutrition Monitoring and Evaluation:   Food/Nutrient Intake Outcomes:  Food and Nutrient Intake, Supplement Intake  Physical Signs/Symptoms Outcomes:  Biochemical Data, Skin, Weight, GI Status     Discharge Planning:    Continue current diet     Electronically signed by Sheryle Roberts, MS, RD, LD on 8/15/20 at 3:45 PM EDT

## 2020-08-15 NOTE — PROGRESS NOTES
[facility] OCCUPATIONAL THERAPY MED SURG TREATMENT NOTE     Date: 8/15/2020  Patient Name: Junior Roberson        MRN: [id number]  Account: [de-identified]   : 1950  (65 y.o.)  Room: [place] [address]    Chart Review:  Diagnosis:  The primary encounter diagnosis was Thoracic compression fracture, closed, initial encounter [facility]). Diagnoses of Leg weakness, bilateral and Bilateral leg numbness were also pertinent to this visit. Restrictions:    Restrictions/Precautions  Restrictions/Precautions: Fall Risk  Position Activity Restriction  Spinal Precautions: No Bending, No Lifting, No Twisting  Pt demonstrates good awareness of precautions throughout session. Subjective:  Patient states: \"I feel much better. \"  Pain:  Start of tx:  Pre Treatment Pain Screening  Pain at present: 4  Scale Used: Numeric Score  Intervention List: Patient able to continue with treatment, Patient declined any intervention  Comments / Details: Pt reports that she recently had pain medication    End of tx:  Pain Assessment  Patient Currently in Pain: Yes  Pain Assessment: 0-10  Pain Level: 4  Pain Type: Acute pain, Surgical pain  Pain Location: Back, Incision  Pain Orientation: Mid, Upper  Pain Descriptors: Aching  Pain Frequency: Continuous  Clinical Progression: Not changed  Response to Pain Intervention: Patient Satisfied    Objective: Pt washed up at bed level as follows. ADL:    ADL  UE Bathing: Setup, Supervision, Increased time to complete  LE Bathing: Maximum assistance (Assistance to wash/dry posterior jonah area and legs.  Pt able to wash anterior jonah area)  UE Dressing: Unable to assess ([address] only)  LE Dressing: None (Pt declined to don pants)      Therapy key for assistance levels -   Independent = Pt. is able to perform task with no assistance but may require a device   Stand by assistance = Pt. does not perform task at an independent level but does not need physical assistance, requires verbal cues  Minimal, Moderate, Maximal Assistance = Pt. requires physical assistance (25%, 50%, 75% assist from helper) for task but is able to actively participate in task   Dependent = Pt. requires total assistance with task and is not able to actively participate with task completion      Cognition:  Cognition  Overall Cognitive Status: WFL    Bed Mobility  Bed mobility  Rolling to Left: Minimal assistance (Assistance to initiate. Increased time and effort)  Rolling to Right: Supervision (Increased time and effort)      Treatment consisted of:  ADL Training    Assessment/Discharge Disposition: Pt tolerated treatment well. Pt required increased time and effort for bed mobility secondary to pain.    Performance deficits / Impairments: Decreased functional mobility , Decreased ADL status, Decreased strength, Decreased endurance, Decreased balance, Decreased high-level IADLs  Prognosis: Good  Discharge Recommendations: Continue to assess pending progress  History: Pt's medical history is moderately complex  Exam: Pt. has 6 performance deficits  Assistance / Modification: Pt. requires min A      6-Click  How much help for putting on and taking off regular lower body clothing?: A Lot  How much help for Bathing?: A Lot  How much help for Toileting?: A Lot  How much help for putting on and taking off regular upper body clothing?: A Little  How much help for taking care of personal grooming?: A Little  How much help for eating meals?: None  AM-PAC Inpatient Daily Activity Raw Score: 16  AM-PAC Inpatient ADL T-Scale Score : 35.96  ADL Inpatient CMS 0-100% Score: 53.32    Plan:  Continue OT per POC    Goals/Plan:  Improve Balance  Improve Strength  Improve Functional Transfers  Improve ADL Bent    Minutes:  OT Individual Minutes  Time In: 3716  Time Out: 1019  Minutes: 28    ADL trainin minutes    Electronically signed by:    Lexis Segal  8/15/2020, 10:28 AM

## 2020-08-15 NOTE — PROGRESS NOTES
Patient was asleep when I went in to empty her capellan. She denies any pain at this time. Urine is on ice for the 24 hour collection.

## 2020-08-15 NOTE — PROGRESS NOTES
Patient is resting comfortably on her right side with an ice pack to her back. No pain at this time. Antibiotic infusing. Lights off and call light with patient.

## 2020-08-15 NOTE — PROGRESS NOTES
Hospitalist Daily Progress Note  Name: Kathy Garces  Age: 65 y.o. Gender: female  CodeStatus: Full Code  Allergies: Codeine    Chief Complaint:Other (pt sent from MRI for tx )      Primary Care Provider: LADONNA Singh CNP    InpatientTreatment Team: Treatment Team: Attending Provider: Jerod Carolina MD; Consulting Physician: Dany Wolfe MD; Surgeon: Dany Wolfe MD; Utilization Reviewer: Smitha Foster RN; : Madelaine Gonzales, RN; Utilization Reviewer: Bria Tan, JERMAINE; Consulting Physician: Hussein Iniguez DO; Registered Nurse: Juan Garrido RN; Patient Care Tech: Mike Villafana    Admission Date: 8/7/2020      Subjective: No chest pain, sob, nausea. Post op pain control adequate. Patient weak, but hopeful for recovery. Physical Exam  Vitals signs and nursing note reviewed. Constitutional:       Appearance: Normal appearance. Cardiovascular:      Rate and Rhythm: Normal rate and regular rhythm. Pulmonary:      Effort: Pulmonary effort is normal.      Breath sounds: Normal breath sounds. Abdominal:      General: Bowel sounds are normal.      Palpations: Abdomen is soft. Musculoskeletal: Normal range of motion. Skin:     General: Skin is warm and dry. Neurological:      Mental Status: She is alert and oriented to person, place, and time. Mental status is at baseline.          Medications:  Reviewed    Infusion Medications:    sodium chloride Stopped (08/14/20 1943)     Scheduled Medications:    fentaNYL  1 patch Transdermal Q72H    sodium chloride flush  10 mL Intravenous 2 times per day    bisacodyl  5 mg Oral Daily    cefTRIAXone (ROCEPHIN) IV  1 g Intravenous Q24H    sodium chloride flush  10 mL Intravenous 2 times per day    atenolol  50 mg Oral Daily    calcium elemental  500 mg Oral Daily     PRN Meds: sodium chloride flush, melatonin, sodium chloride flush, HYDROmorphone **OR** HYDROmorphone, acetaminophen, oxyCODONE, ondansetron **OR** ondansetron, calcium carbonate, tiZANidine, sodium chloride flush, acetaminophen **OR** acetaminophen, polyethylene glycol, promethazine **OR** ondansetron, HYDROmorphone, traMADol    Labs:   Recent Labs     08/12/20  0549 08/13/20  0528 08/14/20  0608   WBC 8.1 9.0 9.0   HGB 8.2* 9.1* 8.1*   HCT 24.2* 26.8* 23.7*    211 209     Recent Labs     08/12/20  0549 08/13/20  0528 08/14/20  0608    137 135   K 4.0 4.2 4.7   * 106 104   CO2 24 23 24   BUN 14 10 9   CREATININE 0.56 0.52 0.43*   CALCIUM 8.2* 8.4* 8.4*     No results for input(s): AST, ALT, BILIDIR, BILITOT, ALKPHOS in the last 72 hours. No results for input(s): INR in the last 72 hours. No results for input(s): Laverda Dancer in the last 72 hours. Urinalysis:   Lab Results   Component Value Date    NITRU POSITIVE 08/08/2020    WBCUA  08/08/2020    BACTERIA MANY 08/08/2020    RBCUA 10-20 08/08/2020    BLOODU SMALL 08/08/2020    SPECGRAV 1.018 08/08/2020    GLUCOSEU Negative 08/08/2020       Radiology:   Most recent    Chest CT      WITH CONTRAST:No results found for this or any previous visit. WITHOUT CONTRAST: No results found for this or any previous visit. CXR      2-view: No results found for this or any previous visit. Portable: No results found for this or any previous visit. Echo No results found for this or any previous visit.           Assessment/Plan:    Active Hospital Problems    Diagnosis Date Noted    Malignant neoplasm of thoracic vertebra ([facility] [C41.2] 08/13/2020    Thoracic compression fracture, closed, initial encounter ([facility] [S22.000A] 08/07/2020    Paraparesis ([facility] [G82.20] 08/07/2020    Acquired spondylolisthesis of thoracic spine [M43.14] 08/07/2020    Spinal stenosis, thoracic [M48.04] 08/07/2020     Thoracic compression fracture: Pain control with narcotics,  Neurosurgery consult in the ED with recommendation for surgical intervention    8/8 - planning for sx 8/10   8/9 - no significant change, poss sx 8/10.   8/10 - OR today, post op care   8/11 - pod 1, tolerating, cont plans for discharge/rehab   8/12 - pod 2, cont pain control, awaiting snf/rehab planning   8/13 - pod 3, oncology following, awaiting plans for diagnosis and treatment    HTN: Resume antihypertensive blood pressure monitoring       DVT Prophylaxis:SCD for now, anticipating surgery  Diet: DIET GENERAL;  Code Status: Full Code      Electronically signed by Spenser Hansen MD on 8/15/2020 at 1:25 AM

## 2020-08-15 NOTE — PROGRESS NOTES
Hospitalist Daily Progress Note  Name: James Castellanos  Age: 65 y.o. Gender: female  CodeStatus: Full Code  Allergies: Codeine    Chief Complaint:Other (pt sent from MRI for tx )      Primary Care Provider: LADONNA Costello CNP    InpatientTreatment Team: Treatment Team: Attending Provider: Shilpi Munoz MD; Consulting Physician: Isis Meek MD; Surgeon: Isis Meek MD; Consulting Physician: Mitra Archer DO; : Diane Fine RN; Ambulatory Care Manager: Sara Sandoval RN; Consulting Physician: Derian Carmona MD; Registered Nurse: Trinidad Bryant RN    Admission Date: 8/7/2020      Subjective: No chest pain, sob, nausea. Pain not well controlled. Patient unable to complete therapy due to pain. Physical Exam  Vitals signs and nursing note reviewed. Constitutional:       Appearance: Normal appearance. Cardiovascular:      Rate and Rhythm: Normal rate and regular rhythm. Pulmonary:      Effort: Pulmonary effort is normal.      Breath sounds: Normal breath sounds. Abdominal:      General: Bowel sounds are normal.      Palpations: Abdomen is soft. Musculoskeletal: Normal range of motion. Skin:     General: Skin is warm and dry. Neurological:      Mental Status: She is alert and oriented to person, place, and time. Mental status is at baseline.          Medications:  Reviewed    Infusion Medications:    sodium chloride Stopped (08/14/20 1943)     Scheduled Medications:    fentaNYL  1 patch Transdermal Q72H    sodium chloride flush  10 mL Intravenous 2 times per day    bisacodyl  5 mg Oral Daily    cefTRIAXone (ROCEPHIN) IV  1 g Intravenous Q24H    sodium chloride flush  10 mL Intravenous 2 times per day    atenolol  50 mg Oral Daily    calcium elemental  500 mg Oral Daily     PRN Meds: sodium chloride flush, melatonin, sodium chloride flush, HYDROmorphone **OR** HYDROmorphone, acetaminophen, oxyCODONE, ondansetron **OR** ondansetron, calcium 8/10.   8/10 - OR today, post op care   8/11 - pod 1, tolerating, cont plans for discharge/rehab   8/12 - pod 2, cont pain control, awaiting snf/rehab planning   8/13 - pod 3, oncology following, awaiting plans for diagnosis and treatment   8/14 - pod 4, for whole body bone scan today   8/15 - pod 5, pain mgmt consult, dc planning soon.       HTN: Resume antihypertensive blood pressure monitoring       DVT Prophylaxis:SCD for now, anticipating surgery  Diet: DIET GENERAL;  Code Status: Full Code      Electronically signed by Hugo Muñoz MD on 8/15/2020 at 7:37 PM

## 2020-08-15 NOTE — PROGRESS NOTES
Patient is alert and oriented. She has pain in he abdomen and describes the pain as constant and cramping. She rates the pain 4 out of 10. Patient received Roxicodone and Zanaflex.

## 2020-08-16 VITALS
RESPIRATION RATE: 18 BRPM | WEIGHT: 157 LBS | SYSTOLIC BLOOD PRESSURE: 111 MMHG | HEART RATE: 52 BPM | TEMPERATURE: 97.7 F | OXYGEN SATURATION: 98 % | HEIGHT: 62 IN | BODY MASS INDEX: 28.89 KG/M2 | DIASTOLIC BLOOD PRESSURE: 48 MMHG

## 2020-08-16 LAB
ANION GAP SERPL CALCULATED.3IONS-SCNC: 10 MEQ/L (ref 9–15)
BASOPHILS ABSOLUTE: 0 K/UL (ref 0–0.2)
BASOPHILS RELATIVE PERCENT: 0.6 %
BUN BLDV-MCNC: 10 MG/DL (ref 8–23)
CALCIUM SERPL-MCNC: 8.5 MG/DL (ref 8.5–9.9)
CHLORIDE BLD-SCNC: 104 MEQ/L (ref 95–107)
CO2: 24 MEQ/L (ref 20–31)
CREAT SERPL-MCNC: 0.53 MG/DL (ref 0.5–0.9)
EOSINOPHILS ABSOLUTE: 0.2 K/UL (ref 0–0.7)
EOSINOPHILS RELATIVE PERCENT: 3.4 %
GFR AFRICAN AMERICAN: >60
GFR NON-AFRICAN AMERICAN: >60
GLUCOSE BLD-MCNC: 138 MG/DL (ref 70–99)
HCT VFR BLD CALC: 22.6 % (ref 37–47)
HEMOGLOBIN: 7.7 G/DL (ref 12–16)
IGA: 15 MG/DL (ref 68–408)
IGG: 1530 MG/DL (ref 768–1632)
IGM: 7 MG/DL (ref 35–263)
KAPPA FREE LIGHT CHAINS QNT: 1049.45 MG/L (ref 3.3–19.4)
KAPPA/LAMBDA FREE LIGHT CHAIN RATIO: 140.11 (ref 0.26–1.65)
LAMBDA FREE LIGHT CHAINS QNT: 7.49 MG/L (ref 5.71–26.3)
LYMPHOCYTES ABSOLUTE: 0.8 K/UL (ref 1–4.8)
LYMPHOCYTES RELATIVE PERCENT: 16 %
MCH RBC QN AUTO: 32.9 PG (ref 27–31.3)
MCHC RBC AUTO-ENTMCNC: 34.1 % (ref 33–37)
MCV RBC AUTO: 96.6 FL (ref 82–100)
MONOCYTES ABSOLUTE: 0.6 K/UL (ref 0.2–0.8)
MONOCYTES RELATIVE PERCENT: 11.8 %
NEUTROPHILS ABSOLUTE: 3.2 K/UL (ref 1.4–6.5)
NEUTROPHILS RELATIVE PERCENT: 68.2 %
PDW BLD-RTO: 12.8 % (ref 11.5–14.5)
PLATELET # BLD: 254 K/UL (ref 130–400)
POTASSIUM REFLEX MAGNESIUM: 3.8 MEQ/L (ref 3.4–4.9)
RBC # BLD: 2.34 M/UL (ref 4.2–5.4)
SARS-COV-2, NAAT: NOT DETECTED
SODIUM BLD-SCNC: 138 MEQ/L (ref 135–144)
WBC # BLD: 4.8 K/UL (ref 4.8–10.8)

## 2020-08-16 PROCEDURE — U0002 COVID-19 LAB TEST NON-CDC: HCPCS

## 2020-08-16 PROCEDURE — 80048 BASIC METABOLIC PNL TOTAL CA: CPT

## 2020-08-16 PROCEDURE — 36415 COLL VENOUS BLD VENIPUNCTURE: CPT

## 2020-08-16 PROCEDURE — 2700000000 HC OXYGEN THERAPY PER DAY

## 2020-08-16 PROCEDURE — 6370000000 HC RX 637 (ALT 250 FOR IP): Performed by: NEUROLOGICAL SURGERY

## 2020-08-16 PROCEDURE — 6370000000 HC RX 637 (ALT 250 FOR IP): Performed by: NURSE PRACTITIONER

## 2020-08-16 PROCEDURE — 2580000003 HC RX 258: Performed by: NURSE PRACTITIONER

## 2020-08-16 PROCEDURE — 85025 COMPLETE CBC W/AUTO DIFF WBC: CPT

## 2020-08-16 RX ORDER — TRAMADOL HYDROCHLORIDE 50 MG/1
50 TABLET ORAL EVERY 6 HOURS PRN
Qty: 15 TABLET | Refills: 0 | Status: SHIPPED | OUTPATIENT
Start: 2020-08-16 | End: 2021-12-07 | Stop reason: SDUPTHER

## 2020-08-16 RX ORDER — CALCIUM CARBONATE 200(500)MG
500 TABLET,CHEWABLE ORAL 3 TIMES DAILY PRN
Qty: 30 TABLET | Refills: 0
Start: 2020-08-16 | End: 2020-09-15

## 2020-08-16 RX ORDER — CIPROFLOXACIN 250 MG/1
250 TABLET, FILM COATED ORAL 2 TIMES DAILY
Qty: 6 TABLET | Refills: 0
Start: 2020-08-16 | End: 2020-08-19

## 2020-08-16 RX ORDER — FENTANYL 12 UG/H
1 PATCH TRANSDERMAL
Qty: 10 PATCH | Refills: 0 | Status: SHIPPED | OUTPATIENT
Start: 2020-08-17 | End: 2020-09-16

## 2020-08-16 RX ORDER — OXYCODONE HYDROCHLORIDE 5 MG/1
5 TABLET ORAL EVERY 4 HOURS PRN
Qty: 15 TABLET | Refills: 0 | Status: SHIPPED | OUTPATIENT
Start: 2020-08-16 | End: 2020-08-19

## 2020-08-16 RX ORDER — UREA 10 %
5 LOTION (ML) TOPICAL NIGHTLY PRN
Qty: 30 TABLET | Refills: 0
Start: 2020-08-16

## 2020-08-16 RX ADMIN — ACETAMINOPHEN 650 MG: 325 TABLET, FILM COATED ORAL at 10:46

## 2020-08-16 RX ADMIN — CALCIUM 500 MG: 500 TABLET ORAL at 10:46

## 2020-08-16 RX ADMIN — TIZANIDINE 4 MG: 4 TABLET ORAL at 10:46

## 2020-08-16 RX ADMIN — TRAMADOL HYDROCHLORIDE 50 MG: 50 TABLET, FILM COATED ORAL at 04:33

## 2020-08-16 RX ADMIN — ACETAMINOPHEN 650 MG: 325 TABLET, FILM COATED ORAL at 04:34

## 2020-08-16 RX ADMIN — TRAMADOL HYDROCHLORIDE 50 MG: 50 TABLET, FILM COATED ORAL at 10:46

## 2020-08-16 RX ADMIN — ACETAMINOPHEN 650 MG: 325 TABLET, FILM COATED ORAL at 16:49

## 2020-08-16 RX ADMIN — TRAMADOL HYDROCHLORIDE 50 MG: 50 TABLET, FILM COATED ORAL at 16:49

## 2020-08-16 RX ADMIN — TIZANIDINE 4 MG: 4 TABLET ORAL at 16:48

## 2020-08-16 RX ADMIN — TIZANIDINE 4 MG: 4 TABLET ORAL at 04:34

## 2020-08-16 RX ADMIN — Medication 10 ML: at 10:46

## 2020-08-16 ASSESSMENT — PAIN SCALES - GENERAL
PAINLEVEL_OUTOF10: 2
PAINLEVEL_OUTOF10: 3
PAINLEVEL_OUTOF10: 4
PAINLEVEL_OUTOF10: 5

## 2020-08-16 NOTE — FLOWSHEET NOTE
1100- Patient AOx4, pain 4/10, oral pain medication given at this time. Lungs are clear, patient instructed to use incentive spirometer. Abdomen is non tender, bowel sounds active. Patient has a medium size solid BM this morning. 600ml of dark yellow urine emptied from wall suction. New purwick and cannister in place. Surgical incision on upper back is clean and dry, no drainage or redness. Patient able to roll from side to side in bed with no issues.  Lindsey Romero stated that the supervisor gave permission for the rapid covid test, specimen obtained and sent to lab.    1500- Report called to [facility],  time scheduled for 5pm, patient aware. 1615- 2 IV's removed, telemetry removed and sent to [address] [place] [address] Patient dressed, daughter is collecting belongings and chargers.

## 2020-08-16 NOTE — DISCHARGE SUMMARY
Physician Discharge Summary     Patient ID:  Chelo Alejandro  [id number]  6 y.o.  1950    Admit date: 8/7/2020    Discharge date : 08/16/20     Admitting Physician: Jas Smith MD     Discharge Physician: Stu Castillo MD     Admission Diagnoses: Thoracic compression fracture, closed, initial encounter [facility]) [S22.000A]    Discharge Diagnoses: Thoracic compression fracture, pathologic, s/p decompression, fragment removal, biopsy, UTI    Admission Condition: fair    Discharged Condition: fair    Hospital Course:   Thoracic compression fracture: Pain control with narcotics,  Neurosurgery consult in the [facility] recommendation for surgical intervention              8/8 - planning for sx 8/10              8/9 - no significant change, poss sx 8/10.              8/10 - OR today, post op care              8/11 - pod 1, tolerating, cont plans for discharge/rehab              8/12 - pod 2, cont pain control, awaiting snf/rehab planning              8/13 - pod 3, oncology following, awaiting plans for diagnosis and treatment              8/14 - pod 4, for whole body bone scan today              8/15 - pod 5, pain mgmt consult, [place] planning soon. 8/16 - pod 6, fentanyl added, po oxy and tramadol for breakthrough pain    UTI - received rocephin, culture not available, complete 3 days cipro at dc     HTN: Resume antihypertensive blood pressure monitoring     Consults: hematology/oncology and neurosurgery    Significant Diagnostic Studies: as below  Impression         Acute/subacute compression fracture with 50-60% height loss, of the T8 vertebral body, with associated severe canal narrowing and severe bilateral foraminal narrowing at T8-T9 secondary to retropulsed fragments. The underlying cause of the compression    fracture appears to be underlying osteopenia/osteoporosis.  Cord signal grossly appears within normal limits at the compression fracture level       FINAL DIAGNOSIS:   T8 BONE BIOPSY:   SHEETS OF PLASMA CELLS REPLACING BONE MARROW, CONSISTENT WITH PLASMA CELL   NEOPLASM       Discharge Exam:  BP (!) 111/48   Pulse 52   Temp 97.7 °F (36.5 °C) (Oral)   Resp 18   Ht 5' 2\" (1.575 m)   Wt 157 lb (71.2 kg)   LMP  (LMP Unknown)   SpO2 98%   BMI 28.72 kg/m²   General appearance: alert, appears stated age and cooperative  Lungs: clear to auscultation bilaterally  Heart: regular rate and rhythm, S1, S2 normal, no murmur, click, rub or gallop  Abdomen: soft, non-tender; bowel sounds normal; no masses,  no organomegaly  Extremities: extremities normal, atraumatic, no cyanosis or edema  Skin: Skin color, texture, turgor normal. No rashes or lesions    Labs:   Recent Labs     08/14/20  0608 08/15/20  0525 08/16/20  0529   WBC 9.0 7.9 4.8   HGB 8.1* 7.9* 7.7*   HCT 23.7* 23.0* 22.6*    227 254     Recent Labs     08/14/20  0608 08/15/20  0525 08/16/20  0529    131* 138   K 4.7 3.7 3.8    98 104   CO2 24 24 24   BUN 9 10 10   CREATININE 0.43* 0.43* 0.53   CALCIUM 8.4* 8.1* 8.5     No results for input(s): AST, ALT, BILIDIR, BILITOT, ALKPHOS in the last 72 hours. No results for input(s): INR in the last 72 hours. No results for input(s): Santiago Lust in the last 72 hours. Urinalysis:   Lab Results   Component Value Date    NITRU POSITIVE 08/08/2020    WBCUA  08/08/2020    BACTERIA MANY 08/08/2020    RBCUA 10-20 08/08/2020    BLOODU SMALL 08/08/2020    SPECGRAV 1.018 08/08/2020    GLUCOSEU Negative 08/08/2020       Radiology:   Most recent    Chest CT      WITH CONTRAST:No results found for this or any previous visit. WITHOUT CONTRAST: No results found for this or any previous visit. CXR      2-view: No results found for this or any previous visit. Portable: No results found for this or any previous visit. Echo No results found for this or any previous visit.     Disposition: SNF    In process/preliminary results:  Outstanding Order Results     Date and Time Order Name Status Description    8/14/2020 0608 Electrophoresis Protein, Serum without Reflex to Immunofixation In process     8/14/2020 0600 Electrophoresis Protein Urine 24 HR Preliminary     8/13/2020 1930 Covid-19 Ambulatory Preliminary           Patient Instructions:   Current Discharge Medication List      START taking these medications    Details   fentaNYL (DURAGESIC) 12 MCG/HR Place 1 patch onto the skin every 72 hours for 30 days. Qty: 10 patch, Refills: 0    Comments: Reduce doses taken as pain becomes manageable  Associated Diagnoses: Thoracic compression fracture, closed, initial encounter ([facility])      oxyCODONE (ROXICODONE) 5 MG immediate release tablet Take 1 tablet by mouth every 4 hours as needed (moderate/severe pain) for up to 3 days. Qty: 15 tablet, Refills: 0    Comments: Reduce doses taken as pain becomes manageable  Associated Diagnoses: Thoracic compression fracture, closed, initial encounter ([facility]      calcium carbonate (TUMS) 500 MG chewable tablet Take 1 tablet by mouth 3 times daily as needed for Heartburn  Qty: 30 tablet, Refills: 0      melatonin 1 MG tablet Take 5 tablets by mouth nightly as needed for Sleep  Qty: 30 tablet, Refills: 0      ciprofloxacin (CIPRO) 250 MG tablet Take 1 tablet by mouth 2 times daily for 3 days  Qty: 6 tablet, Refills: 0         CONTINUE these medications which have CHANGED    Details   traMADol (ULTRAM) 50 MG tablet Take 1 tablet by mouth every 6 hours as needed (mild pain) for up to 3 days.   Qty: 15 tablet, Refills: 0    Comments: Reduce doses taken as pain becomes manageable  Associated Diagnoses: Thoracic compression fracture, closed, initial encounter ([facility]         CONTINUE these medications which have NOT CHANGED    Details   calcium carbonate (OSCAL) 500 MG TABS tablet Take 500 mg by mouth daily      tiZANidine (ZANAFLEX) 2 MG tablet Take 1 tablet by mouth 3 times daily as needed (muscle spasms)  Qty: 30 tablet, Refills: 0    Associated Diagnoses: Thoracolumbar back pain      atenolol (TENORMIN) 50 MG tablet Take 1 tablet by mouth daily  Qty: 90 tablet, Refills: 1    Associated Diagnoses: Essential hypertension      alendronate (FOSAMAX) 70 MG tablet Take 1 tablet by mouth every 7 days  Qty: 4 tablet, Refills: 12    Associated Diagnoses: Osteopenia, unspecified location      acetaminophen (TYLENOL) 650 MG CR tablet Take 650 mg by mouth every 8 hours as needed for Pain         STOP taking these medications       predniSONE (DELTASONE) 20 MG tablet Comments:   Reason for Stopping:         naproxen (NAPROSYN) 500 MG tablet Comments:   Reason for Stopping:         metroNIDAZOLE (METROGEL) 0.75 % gel Comments:   Reason for Stopping:             Activity: activity as tolerated  Diet: regular diet  Wound Care: as directed    Follow-up with PCP in 1-2 weeks, Dr. Susana Cornelius in 1-2 weeks, radiation starting 8/19. Dr. Cecile Gilbert  In 3-4 weeks.     DC time 35 minutes    Signed:  Electronically signed by Shae Ly MD on 8/16/2020 at 2:09 PM

## 2020-08-16 NOTE — CARE COORDINATION
MESSAGE LEFT FOR CAYETANO HARRIS, PT FOR POSSIBLE DC TODAY, COVID NEGATIVE.       [address], [address]

## 2020-08-16 NOTE — PROGRESS NOTES
Assessment complete; see flow sheet. Patient alert and oriented times 4 and follows commands. Bilateral lung sounds clear. Bowel sounds times 4 quad. Patient attempted to have a BM on the bed pan without success. Peripheral pulses present times 4 extremities. Patient recently given pain medications and muscle relaxer; see MAR, per orders. Pateint repositioned for comfort. Call light in reach.

## 2020-08-16 NOTE — DISCHARGE INSTR - DIET

## 2020-08-16 NOTE — CARE COORDINATION
MET WITH PT. AAO. 2ND IMM SIGNED. PT REQUESTS TRANSPORT BY [facility] AFTER PAIN MGMT HAS SEEN. PT AWARE CANNOT GUARANTEE PMT.

## 2020-08-17 ENCOUNTER — CARE COORDINATION (OUTPATIENT)
Dept: CASE MANAGEMENT | Age: 70
End: 2020-08-17

## 2020-08-17 ENCOUNTER — OFFICE VISIT (OUTPATIENT)
Dept: GERIATRIC MEDICINE | Age: 70
End: 2020-08-17
Payer: MEDICARE

## 2020-08-17 ENCOUNTER — APPOINTMENT (OUTPATIENT)
Dept: RADIATION ONCOLOGY | Age: 70
End: 2020-08-17
Attending: RADIOLOGY
Payer: MEDICARE

## 2020-08-17 VITALS
WEIGHT: 157 LBS | DIASTOLIC BLOOD PRESSURE: 70 MMHG | RESPIRATION RATE: 18 BRPM | HEART RATE: 75 BPM | SYSTOLIC BLOOD PRESSURE: 122 MMHG | BODY MASS INDEX: 28.72 KG/M2 | TEMPERATURE: 98.3 F

## 2020-08-17 PROBLEM — M48.04 SPINAL STENOSIS, THORACIC: Status: RESOLVED | Noted: 2020-08-07 | Resolved: 2020-08-17

## 2020-08-17 PROBLEM — S22.000A THORACIC COMPRESSION FRACTURE, CLOSED, INITIAL ENCOUNTER (HCC): Status: RESOLVED | Noted: 2020-08-07 | Resolved: 2020-08-17

## 2020-08-17 PROBLEM — M47.14 MYELOPATHY OF THORACIC REGION: Status: RESOLVED | Noted: 2020-08-08 | Resolved: 2020-08-17

## 2020-08-17 PROBLEM — G82.20 PARAPARESIS (HCC): Status: RESOLVED | Noted: 2020-08-07 | Resolved: 2020-08-17

## 2020-08-17 PROBLEM — M80.08XA CRUSH FRACTURE OF VERTEBRA DUE TO OSTEOPOROSIS (HCC): Status: RESOLVED | Noted: 2020-08-07 | Resolved: 2020-08-17

## 2020-08-17 LAB
ALBUMIN SERPL-MCNC: 2.31 G/DL (ref 3.75–5.01)
ALBUMIN SERPL-MCNC: 2.7 G/DL (ref 3.5–4.6)
ALP BLD-CCNC: 45 U/L (ref 40–130)
ALPHA-1-GLOBULIN: 0.48 G/DL (ref 0.19–0.46)
ALPHA-2-GLOBULIN: 0.77 G/DL (ref 0.48–1.05)
ALT SERPL-CCNC: 12 U/L (ref 0–33)
ANION GAP SERPL CALCULATED.3IONS-SCNC: 13 MEQ/L (ref 9–15)
AST SERPL-CCNC: 24 U/L (ref 0–35)
BASOPHILS ABSOLUTE: 0 K/UL (ref 0–0.2)
BASOPHILS RELATIVE PERCENT: 0.7 %
BETA GLOBULIN: 0.44 G/DL (ref 0.48–1.1)
BILIRUB SERPL-MCNC: <0.2 MG/DL (ref 0.2–0.7)
BUN BLDV-MCNC: 9 MG/DL (ref 8–23)
CALCIUM SERPL-MCNC: 8.7 MG/DL (ref 8.5–9.9)
CHLORIDE BLD-SCNC: 102 MEQ/L (ref 95–107)
CO2: 25 MEQ/L (ref 20–31)
CREAT SERPL-MCNC: 0.52 MG/DL (ref 0.5–0.9)
EOSINOPHILS ABSOLUTE: 0.3 K/UL (ref 0–0.7)
EOSINOPHILS RELATIVE PERCENT: 6.9 %
GAMMA GLOBULIN: 1.41 G/DL (ref 0.62–1.51)
GFR AFRICAN AMERICAN: >60
GFR NON-AFRICAN AMERICAN: >60
GLOBULIN: 3.7 G/DL (ref 2.3–3.5)
GLUCOSE BLD-MCNC: 88 MG/DL (ref 70–99)
HCT VFR BLD CALC: 24.9 % (ref 37–47)
HEMOGLOBIN: 8.7 G/DL (ref 12–16)
LYMPHOCYTES ABSOLUTE: 1.2 K/UL (ref 1–4.8)
LYMPHOCYTES RELATIVE PERCENT: 25.8 %
MCH RBC QN AUTO: 33.7 PG (ref 27–31.3)
MCHC RBC AUTO-ENTMCNC: 34.9 % (ref 33–37)
MCV RBC AUTO: 96.5 FL (ref 82–100)
MONOCYTES ABSOLUTE: 0.4 K/UL (ref 0.2–0.8)
MONOCYTES RELATIVE PERCENT: 8.6 %
NEUTROPHILS ABSOLUTE: 2.7 K/UL (ref 1.4–6.5)
NEUTROPHILS RELATIVE PERCENT: 58 %
PDW BLD-RTO: 13.1 % (ref 11.5–14.5)
PLATELET # BLD: 313 K/UL (ref 130–400)
POTASSIUM SERPL-SCNC: 3.7 MEQ/L (ref 3.4–4.9)
PROTEIN ELECTROPHORESIS, SERUM: ABNORMAL
RBC # BLD: 2.58 M/UL (ref 4.2–5.4)
SARS-COV-2: NOT DETECTED
SODIUM BLD-SCNC: 140 MEQ/L (ref 135–144)
SOURCE: NORMAL
SPE/IFE INTERPRETATION: ABNORMAL
TOTAL PROTEIN: 5.4 G/DL (ref 6.3–8.2)
TOTAL PROTEIN: 6.4 G/DL (ref 6.3–8)
VITAMIN D 25-HYDROXY: 23.2 NG/ML (ref 30–100)
WBC # BLD: 4.7 K/UL (ref 4.8–10.8)

## 2020-08-17 PROCEDURE — 99304 1ST NF CARE SF/LOW MDM 25: CPT | Performed by: INTERNAL MEDICINE

## 2020-08-17 PROCEDURE — 1123F ACP DISCUSS/DSCN MKR DOCD: CPT | Performed by: INTERNAL MEDICINE

## 2020-08-17 RX ORDER — TIZANIDINE 2 MG/1
2 TABLET ORAL EVERY 6 HOURS PRN
Qty: 40 TABLET | Refills: 0
Start: 2020-08-17 | End: 2020-10-13 | Stop reason: SDUPTHER

## 2020-08-17 RX ORDER — TRAZODONE HYDROCHLORIDE 50 MG/1
25 TABLET ORAL NIGHTLY PRN
Qty: 15 TABLET | Refills: 0
Start: 2020-08-17 | End: 2020-09-15

## 2020-08-17 NOTE — PROGRESS NOTES
Physical Therapy  Facility/Department: [facility] MED SURG [id number]  Physical Therapy Discharge      NAME: Michaela Mittal    : 1950 (65 y.o.)  MRN: [id number]    Account: [de-identified]  Gender: female      Patient has been discharged from acute care hospital. DC patient from current PT program.      Electronically signed by Rbui Samson PT on 20 at 10:42 AM EDT

## 2020-08-17 NOTE — CARE COORDINATION
[facility] Update Call    2020    Patient: Mcihael Villafana Patient : 1950   MRN: [id number]  Reason for Admission: -2020 [address] IP T7-8-9  DECOMPRESSION POSTEROLATERAL FUSION PEDICLE SCREWS T6-7-10-11   Discharge Date: 20 RARS: Readmission Risk Score: 15         Care Transitions Post Acute Facility Update    Care Transitions Interventions  Post Acute Facility:  [facility] AT [facility] [address] [facility] Update

## 2020-08-18 ENCOUNTER — OFFICE VISIT (OUTPATIENT)
Dept: GERIATRIC MEDICINE | Age: 70
End: 2020-08-18
Payer: MEDICARE

## 2020-08-18 PROCEDURE — 1123F ACP DISCUSS/DSCN MKR DOCD: CPT | Performed by: NURSE PRACTITIONER

## 2020-08-18 PROCEDURE — 99309 SBSQ NF CARE MODERATE MDM 30: CPT | Performed by: NURSE PRACTITIONER

## 2020-08-18 RX ORDER — ERGOCALCIFEROL 1.25 MG/1
CAPSULE ORAL
Qty: 12 CAPSULE | Refills: 1 | Status: SHIPPED | OUTPATIENT
Start: 2020-08-18

## 2020-08-18 ASSESSMENT — ENCOUNTER SYMPTOMS
BACK PAIN: 1
ABDOMINAL DISTENTION: 1
VOICE CHANGE: 0
COLOR CHANGE: 0
BOWEL INCONTINENCE: 0
CONSTIPATION: 0
NAUSEA: 0
COUGH: 0
ORTHOPNEA: 0
CHEST TIGHTNESS: 0
TROUBLE SWALLOWING: 0
SHORTNESS OF BREATH: 0
ABDOMINAL PAIN: 0

## 2020-08-18 NOTE — PROGRESS NOTES
Chelo Alejadnro is a 65 y.o. female with hypertension, osteopenia,  whom I am seeing at [facility] ACUTE [facility] AT [address] nursing [facility] for the admission of 08/16/2020, for rehabilitation, after hospital admission at [facility] from 08/07/2020 till 08/16/2020 and spinal surgery on 08/10/2020 for pathological T8 vertebral compression fracture. The patient was seen with his/her consent in his/her room at the facility. I also spoke with the nurse and reviewed the hospital and nursing home records. Chief Complaint   Patient presents with    Follow-Up from Hospital    Back Pain    Hypertension    Insomnia     Brief hospitalization summary:  The patient, who was in good health till June 2020, presented to the emergency room at [facility] on August 7, complaining of severe thoracic back pain, leg weakness and numbness. She gave a history of thoracic back pain becoming progressively worse. MRI of the thoracic spine revealed T8 compression fracture with severe spinal canal narrowing. The patient was admitted for surgical treatment. On August 10, she underwent the 8 laminectomy, decompression and T6-T10 fusion by Dr. Reg Celis of neurosurgery. Surgical pathology revealed plasmacytoma. Oncology consult was obtained. The patient was discharged in stable condition to the nursing home for rehabilitation, with plans for radiation starting 08/19/2020, follow up with the neurosurgeon and with the oncologist in 2 weeks time. Associated findings included uncomplicated UTI which was treated in the hospital with IV Rocephin and followed with oral Ciprofloxacillin x 3 days. Interim history:   Since admission to the nursing facility, the patient has continued to complain of severe pain at the operative site, along with muscle spasms and upper abdominal distention and pain. The pain is partially relieved with opiates administered continuously via fentanyl patch and orally for breakthrough pain.   She also complains of Non- 08/07/2020 >60  >60 Final    Comment: >60 mL/min/1.73m2 EGFR, calc. for ages 25 and older using the  MDRD formula (not corrected for weight), is valid for stable  renal function.  GFR  08/07/2020 >60  >60 Final    Comment: >60 mL/min/1.73m2 EGFR, calc. for ages 25 and older using the  MDRD formula (not corrected for weight), is valid for stable  renal function.       Sample Type 08/07/2020 JYOTI   Final    Performed on 08/07/2020 SEE BELOW   Final    Performed on POC     NM BONE SCAN WHOLE BODY : 8/14/2020         CLINICAL HISTORY:  myeloma .  T8 plasmacytoma.         COMPARISON: Thoracic and lumbar spine MRIs 8/7/2020, and thoracic spine radiographs 8/13/2020.         TECHNIQUE: Approximately 3 hours after the intravenous administration of 20.3 mCi of technetium 99m MDP, whole body planar images were obtained.              FINDINGS:         Mild to moderate radiotracer accumulation is noted of the mid thoracic spine secondary to the T8 compression fracture and recent T6-T10 posterior fusion.         There is no other abnormal radiotracer accumulation identified elsewhere to suggest occult fractures or multiple myeloma, which is typically insensitive with nuclear medicine bone scan.                        Impression         ACTIVITY WITHIN THE PATHOLOGIC T8 COMPRESSION FRACTURE AND T6-T10 SURGICAL CHANGES.         NO OTHER ABNORMAL RADIOTRACER ACCUMULATION IDENTIFIED ELSEWHERE.         NUCLEAR MEDICINE BONE SCANS ARE TYPICALLY INSENSITIVE FOR EVALUATION OF MULTIPLE MYELOMA/PLASMACYTOMA.                  EXAMINATION:  MRI LUMBAR SPINE W WO CONTRAST, MRI THORACIC SPINE W WO CONTRAST 08/07/2020        HISTORY:  R20.0 Bilateral leg numbness ICD10         TECHNIQUE: Routine thoracic and lumbosacral spine MR protocol without and with intravenous gadolinium.         CONTRAST: 15 ml of gadoteridol (PROHANCE)         COMPARISON: Thoracic spine radiographs 6/26/2020.               RESULT:         Thoracic spine:         Counting reference:  Lumbosacral junction. For the purposes of this report, L5-S1 is the last well-formed disc space.         Alignment:  Mild kyphotic deformity secondary to the compression fracture.         Cord:  Compression of the cord at the T8 level associated with the retropulsed fragments from the compression fracture as below. Signal intensity in the cord still appears within normal limits.         Bone marrow signal/fracture:  Compression fracture of the T8 vertebral body, with decreased T1 signal and increased STIR signal, consistent with acute/subacute fracture. Around 50-60% height loss. Associated retropulsed fragment(s) with severe canal    narrowing. No other fractures.   No distinct underlying focal mass. Diffuse enhancement within the T8 vertebral body likely related to the fracture.         Thoracic paraspinal soft tissues:  The paraspinal soft tissues are within normal limits.         Canal and foramina:  Severe cord narrowing at the T8 level as above secondary to the retropulsed ligaments and ligamentous hypertrophy. There is also severe bilateral foraminal narrowing at the T8-T9 level. Otherwise canal and foramina appear grossly    patent.         Lumbar spine:         Counting reference:  Lumbosacral junction.  For the purposes of this report, L5-S1 is considered the last well-formed disc space. 5 lumbar type vertebral bodies.         Alignment:    Alignment is anatomic.         Bone marrow signal/fracture:  No evidence of pathologic marrow infiltration.  No evidence of prior fracture.  Hemangioma within the superior endplate of L1.         Conus:  The conus is within normal limits of signal intensity and morphology.           Paraspinal soft tissues:   Paraspinal soft tissues are within normal limits.         T12-L1:  No significant canal or foraminal narrowing.         L1-L2:    No significant canal or foraminal narrowing.         L2-L3:    Broad-based disc bulge, disc height loss, with mild to moderate right foraminal narrowing and mild left foraminal narrowing, without significant canal narrowing.         L3-L4:    Broad-based disc bulge, disc height loss, ligamentous hypertrophy with mild right foraminal narrowing without significant canal or left foraminal narrowing.         L4-L5:    Broad-based disc bulge, facet joint changes, limited hypertrophy, with mild canal narrowing, mild to moderate left foraminal narrowing.         L5-S1:    No significant canal or foraminal narrowing.         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 Otherwise the visualized sacrum and iliac wings are within normal limits.                   Impression         Acute/subacute compression fracture with 50-60% height loss, of the T8 vertebral body, with associated severe canal narrowing and severe bilateral foraminal narrowing at T8-T9 secondary to retropulsed fragments. The underlying cause of the compression    fracture appears to be underlying osteopenia/osteoporosis. Cord signal grossly appears within normal limits at the compression fracture level.         COMMUNICATION:  Communicated with: Lexie Gupta on 8/7/2020 at 0915.   Per discussion, patient will be sent to ER. Aleksander Carroll communicated with ER PA on 8/7/2020 at 0920.            HPI:    Back Pain   The current episode started more than 1 month ago. The problem is unchanged. The pain is present in the thoracic spine. The quality of the pain is described as aching, cramping and stabbing. The pain does not radiate. The pain is severe. The pain is the same all the time. The symptoms are aggravated by position, twisting and coughing. Associated symptoms include weakness. Pertinent negatives include no abdominal pain, bladder incontinence, bowel incontinence, chest pain, dysuria, fever, headaches, leg pain, numbness or pelvic pain.  She has tried analgesics and bed rest for tablet 0    melatonin 1 MG tablet Take 5 tablets by mouth nightly as needed for Sleep 30 tablet 0    ciprofloxacin (CIPRO) 250 MG tablet Take 1 tablet by mouth 2 times daily for 3 days 6 tablet 0    calcium carbonate (OSCAL) 500 MG TABS tablet Take 500 mg by mouth daily      atenolol (TENORMIN) 50 MG tablet Take 1 tablet by mouth daily 90 tablet 1    alendronate (FOSAMAX) 70 MG tablet Take 1 tablet by mouth every 7 days 4 tablet 12    acetaminophen (TYLENOL) 650 MG CR tablet Take 650 mg by mouth every 8 hours as needed for Pain       No current facility-administered medications on file prior to visit. Review of Systems   Constitutional: Negative for appetite change, chills, diaphoresis and fever. HENT: Negative for congestion, nosebleeds, trouble swallowing and voice change. Eyes: Negative for visual disturbance. Respiratory: Negative for cough, chest tightness and shortness of breath. Cardiovascular: Negative for chest pain, palpitations, orthopnea and leg swelling. Gastrointestinal: Positive for abdominal distention. Negative for abdominal pain, bowel incontinence, constipation and nausea. Endocrine: Negative for cold intolerance and heat intolerance. Genitourinary: Negative for bladder incontinence, difficulty urinating, dysuria, hematuria and pelvic pain. Musculoskeletal: Positive for back pain. Negative for arthralgias, joint swelling and neck pain. Skin: Negative for color change. Neurological: Positive for weakness. Negative for tremors, speech difficulty, numbness and headaches. Hematological: Does not bruise/bleed easily. Psychiatric/Behavioral: Positive for sleep disturbance. Negative for confusion, decreased concentration and dysphoric mood. The patient is not nervous/anxious.         Vitals:    08/17/20 1351   BP: 122/70   Pulse: 75   Resp: 18   Temp: 98.3 °F (36.8 °C)   Weight: 157 lb (71.2 kg)       Physical Exam  Constitutional:       Appearance: She is normal weight. She is not ill-appearing or diaphoretic. Comments: Laying supine in bed, anxious due to pain  Looks younger than actual age   HENT:      Head: Normocephalic. Nose: Nose normal.      Mouth/Throat:      Mouth: Mucous membranes are moist.   Eyes:      Extraocular Movements: Extraocular movements intact. Conjunctiva/sclera: Conjunctivae normal.   Neck:      Musculoskeletal: No neck rigidity or muscular tenderness. Cardiovascular:      Rate and Rhythm: Normal rate and regular rhythm. Pulses: Normal pulses. Radial pulses are 2+ on the right side and 2+ on the left side. Dorsalis pedis pulses are 2+ on the right side and 2+ on the left side. Heart sounds: Normal heart sounds. No gallop. Pulmonary:      Effort: Pulmonary effort is normal.      Breath sounds: Normal breath sounds. Abdominal:      General: Bowel sounds are decreased. There is distension (mild, with gas). Palpations: Abdomen is soft. There is no hepatomegaly. Tenderness: There is abdominal tenderness (mild) in the right upper quadrant, epigastric area and left upper quadrant. There is no guarding or rebound. Hernia: No hernia is present. Musculoskeletal:         General: No deformity. Right lower leg: No edema. Left lower leg: No edema. Lymphadenopathy:      Cervical: No cervical adenopathy. Skin:     General: Skin is warm and dry. Comments: Due to patient's level of discomfort, I am postponing turning her over to examine the surgical incision. Please refer to the nurses notes. Neurological:      General: No focal deficit present. Mental Status: She is oriented to person, place, and time. Cranial Nerves: No cranial nerve deficit.       Coordination: Coordination normal.      Deep Tendon Reflexes: Reflexes normal.      Comments: Due to patient's discomfort level, stance and gait not tested, please refer to the physical therapy notes   Psychiatric: mouth every 6 hours as needed (muscle spasms)     Dispense:  40 tablet     Refill:  0    traZODone (DESYREL) 50 MG tablet     Sig: Take 0.5 tablets by mouth nightly as needed for Sleep     Dispense:  15 tablet     Refill:  0       Close follow up needed in one week with CNP or with MD.       I have reviewed the patient's medical and surgical, family and social history, health maintenance schedule, and updated the computerized patient record. Please note this report has been partially produced by using speech recognition hardware. It may contain errors related to the system, including grammar, punctuation and spelling as well as words and phrases that may seem inaccurate. For anyquestions or concerns, please feel free to contact me for clarification.         Electronically signed by Farrah Jenkins MD

## 2020-08-19 ENCOUNTER — HOSPITAL ENCOUNTER (OUTPATIENT)
Dept: RADIATION ONCOLOGY | Age: 70
Discharge: HOME | End: 2020-08-19
Payer: MEDICARE

## 2020-08-19 ENCOUNTER — APPOINTMENT (OUTPATIENT)
Dept: RADIATION ONCOLOGY | Age: 70
End: 2020-08-19
Attending: RADIOLOGY
Payer: MEDICARE

## 2020-08-19 VITALS
RESPIRATION RATE: 18 BRPM | TEMPERATURE: 97.4 F | WEIGHT: 157 LBS | OXYGEN SATURATION: 97 % | DIASTOLIC BLOOD PRESSURE: 68 MMHG | HEIGHT: 62 IN | BODY MASS INDEX: 28.89 KG/M2 | HEART RATE: 61 BPM | SYSTOLIC BLOOD PRESSURE: 148 MMHG

## 2020-08-19 VITALS
BODY MASS INDEX: 28.72 KG/M2 | HEART RATE: 61 BPM | TEMPERATURE: 98.1 F | SYSTOLIC BLOOD PRESSURE: 185 MMHG | RESPIRATION RATE: 16 BRPM | WEIGHT: 157 LBS | OXYGEN SATURATION: 96 % | DIASTOLIC BLOOD PRESSURE: 79 MMHG

## 2020-08-19 PROBLEM — Z98.890 STATUS POST LAMINECTOMY: Status: ACTIVE | Noted: 2020-08-19

## 2020-08-19 PROBLEM — S22.060A COMPRESSION FRACTURE OF T8 VERTEBRA (HCC): Status: ACTIVE | Noted: 2020-08-07

## 2020-08-19 PROBLEM — G89.18 POSTOPERATIVE PAIN AFTER SPINAL SURGERY: Status: ACTIVE | Noted: 2020-08-19

## 2020-08-19 PROBLEM — N30.00 ACUTE CYSTITIS WITHOUT HEMATURIA: Status: ACTIVE | Noted: 2020-08-19

## 2020-08-19 PROBLEM — C90.00 MULTIPLE MYELOMA (HCC): Status: ACTIVE | Noted: 2020-08-19

## 2020-08-19 PROBLEM — M62.838 MUSCLE SPASM: Status: ACTIVE | Noted: 2020-08-19

## 2020-08-19 LAB
HOURS COLLECTED: 24 HR
IFE INTERPRETATION:U: ABNORMAL
KAPPA QUANT FREE LIGHT CHAINS: 1165.4 MG/L (ref 0–32.9)
LAMBDA FREE LIGHT CHAINS URINE/ VOL: 4.58 MG/L (ref 0–3.79)
PROTEIN PROTEIN: 2955 MG/D
URINE FREE KAPPA EXCRETION/DAY: 2913.5 MG/D
URINE FREE LAMBDA EXCRETION/DAY: 11.45 MG/D
URINE TOTAL VOLUME: 2500 ML

## 2020-08-19 PROCEDURE — 77334 RADIATION TREATMENT AID(S): CPT | Performed by: RADIOLOGY

## 2020-08-19 PROCEDURE — 77290 THER RAD SIMULAJ FIELD CPLX: CPT | Performed by: RADIOLOGY

## 2020-08-19 PROCEDURE — 99212 OFFICE O/P EST SF 10 MIN: CPT | Performed by: RADIOLOGY

## 2020-08-19 RX ORDER — SENNA AND DOCUSATE SODIUM 50; 8.6 MG/1; MG/1
1 TABLET, FILM COATED ORAL DAILY
COMMUNITY
End: 2020-10-08

## 2020-08-19 NOTE — H&P
RADIATION NEW PATIENT EVALUATION  DATE OF VISIT: 8/19/2020    DIAGNOSIS & STAGING: solitary plasmacytoma T8 v multiple myeloma      Dear Dr Carmen Wilkerson,     CURRENT COMPLAINT: back pain, here for radiation recommendations    HPI: I was asked by Dr. Carmen Wilkerson to see this 65 y.o. female who presented in July 2020 bilateral flank pain L>R that rapidly worsened over time and resulted in numbness in the legs, with difficulty ambulating. She was seen in the ED on 8/7/20 and MRI showed:   EXAMINATION:  MRI LUMBAR SPINE W WO CONTRAST, MRI THORACIC SPINE W WO CONTRAST         HISTORY:  R20.0 Bilateral leg numbness ICD10         TECHNIQUE: Routine thoracic and lumbosacral spine MR protocol without and with intravenous gadolinium.         CONTRAST: 15 ml of gadoteridol (PROHANCE)         COMPARISON: Thoracic spine radiographs 6/26/2020.                  RESULT:         Thoracic spine:         Counting reference:  Lumbosacral junction. For the purposes of this report, L5-S1 is the last well-formed disc space.         Alignment:  Mild kyphotic deformity secondary to the compression fracture.         Cord:  Compression of the cord at the T8 level associated with the retropulsed fragments from the compression fracture as below. Signal intensity in the cord still appears within normal limits.         Bone marrow signal/fracture:  Compression fracture of the T8 vertebral body, with decreased T1 signal and increased STIR signal, consistent with acute/subacute fracture. Around 50-60% height loss. Associated retropulsed fragment(s) with severe canal    narrowing. No other fractures.   No distinct underlying focal mass. Diffuse enhancement within the T8 vertebral body likely related to the fracture.         Thoracic paraspinal soft tissues:  The paraspinal soft tissues are within normal limits.         Canal and foramina:  Severe cord narrowing at the T8 level as above secondary to the retropulsed ligaments and ligamentous hypertrophy. There is also severe bilateral foraminal narrowing at the T8-T9 level. Otherwise canal and foramina appear grossly    patent.         Lumbar spine:         Counting reference:  Lumbosacral junction.  For the purposes of this report, L5-S1 is considered the last well-formed disc space. 5 lumbar type vertebral bodies.         Alignment:    Alignment is anatomic.         Bone marrow signal/fracture:  No evidence of pathologic marrow infiltration.  No evidence of prior fracture. Hemangioma within the superior endplate of L1.         Conus:  The conus is within normal limits of signal intensity and morphology.           Paraspinal soft tissues:   Paraspinal soft tissues are within normal limits.         T12-L1:  No significant canal or foraminal narrowing.         L1-L2:    No significant canal or foraminal narrowing.         L2-L3:    Broad-based disc bulge, disc height loss, with mild to moderate right foraminal narrowing and mild left foraminal narrowing, without significant canal narrowing.         L3-L4:    Broad-based disc bulge, disc height loss, ligamentous hypertrophy with mild right foraminal narrowing without significant canal or left foraminal narrowing.         L4-L5:    Broad-based disc bulge, facet joint changes, limited hypertrophy, with mild canal narrowing, mild to moderate left foraminal narrowing.         L5-S1:    No significant canal or foraminal narrowing.         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 Otherwise the visualized sacrum and iliac wings are within normal limits.                   Impression         Acute/subacute compression fracture with 50-60% height loss, of the T8 vertebral body, with associated severe canal narrowing and severe bilateral foraminal narrowing at T8-T9 secondary to retropulsed fragments. The underlying cause of the compression    fracture appears to be underlying osteopenia/osteoporosis.  Cord signal grossly appears within normal limits at the compression fracture level. Neurosurgery performed T7-8-9 posterior decompression pedicle screw fusion T6-7 9/10 on 8/10/20. Biopsy showed: FINAL DIAGNOSIS:   T8 BONE BIOPSY:   SHEETS OF PLASMA CELLS REPLACING BONE MARROW, CONSISTENT WITH PLASMA CELL   NEOPLASM     SPECIMEN: BONE, T8   PROCEDURE: BIOPSY   IMMUNOGLOBULIN DEPOSITS: NOT DETECTED       COMMENT: THE BIOPSY SHOWS A CELLULARITY OF 99% WITH THE CELLULARITY   COMPOSED OF PLASMA CELLS IN SHEETS.  VIRTUALLY NO NORMAL HEMATOPOIETIC   CELLS ARE IDENTIFIED.  THE PLASMA CELLS ARE MATURE TYPE.   IMMUNOHISTOCHEMICAL STAINING SHOWS THAT THE TUMOR CELLS ARE POSITIVE FOR    AND CD56.  PANG KERATIN IS NEGATIVE.  THIS STAINING PATTERN   SUPPORTS THE ABOVE DIAGNOSIS.  ALL STAINS ARE PERFORMED IN ASSOCIATION   WITH APPROPRIATE CONTROLS. Dr Isaac Hartley was consulted and myeloma workup is in progress. Pertinent labs include:    Beta-2 Microglobulin  2.3  1.1 - 2.4 mg/L      Sed Rate  84High    0 - 30 mm      Total Protein  5.4Low    6.3 - 8.2 g/dL  Final  08/14/2020  6:08 AM  [facility]    Alb  2. 31Low    3.75 - 5.01 g/dL  Final  08/14/2020  6:08 AM  [facility]    Alpha-1-Globulin  0. 48High    0.19 - 0.46 g/dL  Final  08/14/2020  6:08 AM  [facility]    Alpha-2-Globulin  0.77  0.48 - 1.05 g/dL  Final  08/14/2020  6:08 AM  [facility]    Beta Globulin  0.44Low    0.48 - 1.10 g/dL  Final  08/14/2020  6:08 AM  [facility]    Gamma Globulin  1.41  0.62 - 1.51 g/dL  Final  08/14/2020  6:08 AM  [facility]    SPE/ELICEO Interpretation  See Note   Final  08/14/2020  6:08 AM  [facility]    M-spike in the gamma region.  The monoclonal protein peak   accounts for 1.32 g/dL of the total 1.41 g/dL of protein in   the gamma region.  Suggest ELICEO to identify the monoclonal   protein.  Immunofixation electrophoresis (ELICEO) is a more   sensitive technique for the identification of small   M-proteins.     Protein Electrophoresis, Serum  See Note   Final  08/14/2020  6:08 AM  [facility]         Protein, Ur 2955High    <=150 mg/d  Final  08/16/2020 10:22 AM  [facility]    I   ELICEO Interpretation:U  See Note   Final  08/16/2020 10:22 AM  [facility]    Urine is POSITIVE for monoclonal Free Kappa Light chains   (Bence Saunders Protein). Urine ELICEO shows a monoclonal IgG   heavy chain with associated kappa light chain and excess   monoclonal free kappa light chains. Kappa Qnt Free Light Chains  7495. 40High    0.00 - 32.90 mg/L  Final  08/16/2020 10:22 AM  [facility]    Urine Free Kappa EXCRETION/Day  2913.50  mg/d  Final  08/16/2020 10:22 AM  [facility]    Lambda Free Light Chains Urine/ Vol  4. 58High    0.00 - 3.79 mg/L  Final  08/16/2020 10:22 AM  [facility]    Urine Free Lambda Excretion/Day  11.45  mg/d  Final  08/16/2020 10:22 AM  [facility]    Performed by [facility],   [place] [phone]   www. Krystal Sanabria MD - Lab. Director    Hours Collected  24  hr  Final  08/16/2020 10:22 AM  [address] [facility]    Urine Total Volume  2500  mL  Final  08/16/2020 10:22 AM   - [address]  4831. 45High    3.30 - 19.40 mg/L  Final  08/14/2020  6:08 AM  [facility]    Lambda Free Light Chains QNT  7.49  5.71 - 26.30 mg/L  Final  08/14/2020  6:08 AM  [facility]    Kappa/Lambda Fluid C Ratio  140. 11High    0.26 - 1.65  Final  08/14/2020  6:08 AM  [facility]      IgA  15Low    68 - 408 mg/dL  Final  08/14/2020  6:08 AM  [facility]    REFERENCE INTERVAL: Immunoglobulin A   Access complete set of age- and/or gender-specific reference intervals   for   this test in the [facility] Test Directory ([email or web address]). IgG  1530  768 - 1632 mg/dL  Final  08/14/2020  6:08 AM  [facility]    REFERENCE INTERVAL: Immunoglobulin G   Access complete set of age- and/or gender-specific reference intervals   for   this test in the [facility] Test Directory ([email or web address]).     IgM  7Low    35 - 263 mg/dL  Final  08/14/2020  6:08 AM  [facility]      She was in considerable pain and immobil following surgery and unable to perform skeletal survey. PET CT will be performed. She is in SNF working with PT OT and pain is being controlled with narcotics. She is here today for radiation recommendations. She sees Dr Huang Hoff back on 9/10/20 and is being considered for vertebroplasty/RFA. PAST MEDICAL HISTORY:   Past Medical History:   Diagnosis Date    Acne rosacea     Actinic dermatitis     biopsy proven 1/2018    Anxiety     Benign essential hypertension     Dermatitis     History of episode of anxiety 2017    spouse was ill    History of pathological fracture of vertebra 08/2020    T8, laminectomy-decompression-fusion Dr Huang Hoff, 08/10/2020    Osteopenia     Plasmacytoma ([address] 08/2020    Dr Yehuda Hairston    Urinary incontinence        PAST SURGICAL HISTORY:  Past Surgical History:   Procedure Laterality Date    CATARACT REMOVAL      bilateral    LUMBAR FUSION N/A 8/10/2020    T7-8-9  DECOMPRESSION POSTEROLATERAL FUSION PEDICLE SCREWS T6-7-10-11 performed by Marylen Jules, MD at [address]:   Allergies   Allergen Reactions    Codeine Rash       CURRENT MEDICATIONS:   Prior to Admission medications    Medication Sig Start Date End Date Taking? Authorizing Provider   sennosides-docusate sodium (SENOKOT-S) 8.6-50 MG tablet Take 1 tablet by mouth daily   Yes Historical Provider, MD   vitamin D (ERGOCALCIFEROL) 1.25 MG (20103 UT) CAPS capsule Take 1 capsule orally once weekly x 4 weeks, then 1 capsule orally once monthly 8/18/20  Yes Flora Malagon MD   tiZANidine (ZANAFLEX) 2 MG tablet Take 1 tablet by mouth every 6 hours as needed (muscle spasms) 8/17/20  Yes Amy Ortega MD   traZODone (DESYREL) 50 MG tablet Take 0.5 tablets by mouth nightly as needed for Sleep 8/17/20  Yes Amy Ortega MD   fentaNYL (DURAGESIC) 12 MCG/HR Place 1 patch onto the skin every 72 hours for 30 days.  8/17/20 9/16/20 Yes Reinaldo Ashraf MD   oxyCODONE (ROXICODONE) 5 MG immediate release tablet Take 1 tablet by mouth every 4 hours as needed (moderate/severe pain) for up to 3 days. 8/16/20 8/19/20 Yes Sherren Pyles, MD   traMADol (ULTRAM) 50 MG tablet Take 1 tablet by mouth every 6 hours as needed (mild pain) for up to 3 days. 8/16/20 8/19/20 Yes Sherren Pyles, MD   calcium carbonate (TUMS) 500 MG chewable tablet Take 1 tablet by mouth 3 times daily as needed for Heartburn 8/16/20 9/15/20 Yes Sherren Pyles, MD   melatonin 1 MG tablet Take 5 tablets by mouth nightly as needed for Sleep 8/16/20  Yes Sherren Pyles, MD   ciprofloxacin (CIPRO) 250 MG tablet Take 1 tablet by mouth 2 times daily for 3 days 8/16/20 8/19/20 Yes Sherren Pyles, MD   calcium carbonate (OSCAL) 500 MG TABS tablet Take 500 mg by mouth daily   Yes Historical Provider, MD   atenolol (TENORMIN) 50 MG tablet Take 1 tablet by mouth daily 4/28/20  Yes LADONNA Tejeda CNP   alendronate (FOSAMAX) 70 MG tablet Take 1 tablet by mouth every 7 days 4/28/20  Yes LADONNA Tejeda CNP   acetaminophen (TYLENOL) 650 MG CR tablet Take 650 mg by mouth every 8 hours as needed for Pain   Yes Historical Provider, MD       I have personally reviewed and reconciled the medications listed. FAMILY HISTORY:   Family History   Problem Relation Age of Onset    Diabetes Mother     Hypertension Mother     Hypertension Father     Cancer Sister         multiple myeloma    Breast Cancer Other        SOCIAL HISTORY:   Social History     Tobacco Use   Smoking Status Never Smoker   Smokeless Tobacco Never Used     Social History     Substance and Sexual Activity   Alcohol Use No    Alcohol/week: 0.0 standard drinks       Review Of Systems:  Pain Score:   Pain Score (1-10): 3  Pain Location: mid back   Pain Duration: intermittent  Pain Management/Control: fentanyl, oxycodone, tramadol      Is pain affecting your ability to take care of yourself or move throughout your home? [x]? Yes   []?  No    General: pt [facility]  Patient has gained weight []? Yes   [x]? No  Patient has lost weight []? Yes   [x]? No  How much weight in pounds and over what length of time:      Eyes (Ophthalmic): No Problem                Skin (Dermatological): No Problems                ENT: No Problems                Respiratory: No Problems                Cardiovascular: No Problems                            Device   []? Yes   [x]? No              Copy of Card Obtained []? Yes   [x]? No     Gastrointestinal: Constipation, taking stool softeners and moved bowels on Monday, normal stool     Genito-Urinary: has incontinence of urine with back spasms, being treated for UTI                           Breast: No Problems                Musculoskeletal: pt reports her legs are feeling stonger, was able to stand and pivot today with assistance at nursing home and was up in wheelchair for approx 1 1/2 hrs today. pt came to [facility] on a cot with [facility]     Neurological: Weakness legs                            Hematological and Lymphatic: No Problems                Endocrine: No Problems      Gyn History:   /Para: 2/2  Age of Menarche:   Age of Menopause 46  Vaginal Bleeding:    First Pregnancy:  Breast Feeding:    Last Menstrual period:   Oral Contraceptives:      Number of years:   Hormone Replacement therapy     Number of Years:   Last Pap Smear:   Last [id number]      A 10-point review of systems has been conducted and pertinent positives have been   recorded. All other review of systems are negative. ECOG PERFORMANCE STATUS: 2    VITAL SIGNS:   Vitals:    20 1451   BP: (!) 185/79   Pulse: 61   Resp: 16   Temp: 98.1 °F (36.7 °C)   SpO2: 96%   Weight: 157 lb (71.2 kg)       PHYSICAL EXAMINATION:  Constitutional: No acute distress.  awake, alert, cooperative    HEENT:  Normocephalic, without obvious abnormality, atraumatic, EOMI, external ears without lesions, oral pharynx with moist mucus membranes, orophayrnx clear, mucous membranes moist    NECK:  Supple, without supraclavicular or cervical adenopathy, thyroid symmetric, not enlarged    CARDIOVASCULAR:  Normal apical impulse, regular rate and rhythm, normal S1 and S2, no S3 or S4, and no murmur noted    CHEST/BREASTS:  Breasts symmetrical, skin without lesion(s), no nipple retraction or dimpling, no nipple discharge, no masses palpated, no axillary or supraclavicular adenopathy    LUNGS:  No increased work of breathing, good air exchange, clear to auscultation bilaterally, no crackles or wheezing    ABDOMEN:  Nontender, nondistended, normal bowel sounds, soft, no masses, no hepatosplenomegally    EXTREMITIES: No cyanosis clubbing or edema    MUSCULOSKELETAL: tender mid back, s/p surgery    NEUROLOGIC:  Awake, alert, oriented x 3. SKIN:  no bruising or bleeding, normal skin color, texture, turgor, no redness, warmth, or swelling, no rashes, no lesions, no abnormal moles and no jaundice      STUDIES: I have personally reviewed the imaging, laboratory, and pathology studies as previously described. IMPRESSION/PLAN:    Solitary plasmacytoma v light chain multiple myeloma. PETCT pending as recent spine surgery results in too much pain to participate in bone survey, no end organ damage with elvated IGG. The patient will be simulated today to start tx 8/24/20  We will start with 300 x 10, if PET CT shows solitary site of disease will escalated dose to 40-45Gy. Intent of treatment, potential risks benefits and side effects reviewed today. Thank you for this consult and allowing us to participate in the care of this patient.   Sincerely,    Electronically signed by Quinton Dolan MD on 8/19/20 at 3:05 PM EDT      cc:   No att. providers found  LADONNA Abdul - CNP  Ann Maurer MD  [address] [facility]. [place] [address] [facility] [address]

## 2020-08-20 NOTE — PROGRESS NOTES
Name: [address] [place], [place] [address]vd: [facility] [address]   Mini bear  Date: 8/18/2020     Subjective:     Chief Complaint   Patient presents with    Follow-up     Evaluation of acute rehab progress and chronic conditions    Pain       HPI  Duane Shah is a 65 y.o. female being seen today for evaluation of acute rehab progress, pain, and chronic conditions. Resident admitted from [facility] status post pathological T8 compression fracture with total laminectomy. She also had decompression and fusion of her T6-10 thoracic vertebra in addition to total T8 laminectomy. She has medical history of hypertension osteopenia back pain insomnia spinal stenosis anxiety. She is currently being treated for urinary tract infection with antibiotics. Resident was essentially healthy living at home and was having pain in her back for multiple days which required her to come to the hospital for evaluation and treatment. She complained of weakness in her lower extremities and numbness in her lower extremities, which was becoming progressively worse. .  Surgical incision to her back is healed, no signs of infection. Today she work with physical therapy and was able to get out of bed and pivot into a recliner chair. No ambulation at this time. Did a few exercises in her room. She was able to weight-bear on her extremities. She does complain of significant pain with movement 7-8 out of 10 on a scale of 10. She has a fentanyl patch and other oral medications for pain management. Vital signs are stable, no fever. Postop biopsy revealed multiple myeloma; she has a follow-up appointment with Dr. Cong Okeefe tomorrow for evaluation. Resident denies complaints of chest pain, chest palpitations, irregular heartbeat, lightheadedness, dizziness, nausea, vomiting, diarrhea, constipation.   She does complain of spasms in her abdominal area that is related to her back pain, is on tizanidine for treatment. States that spasms have improved over the last 24 hours. Chronic conditions are at baseline, she denies anxiety at this time. Continue PT OT as ordered. Past Medical History:   Diagnosis Date    Acne rosacea     Actinic dermatitis     biopsy proven 1/2018    Anxiety     Benign essential hypertension     Dermatitis     History of episode of anxiety 2017    spouse was ill    History of pathological fracture of vertebra 08/2020    T8, laminectomy-decompression-fusion Dr Christine Mobley, 08/10/2020    Osteopenia     Plasmacytoma ([facility] [address] 08/2020    Dr Millie Hoover Urinary incontinence        Allergies:  Reviewed in nursing facility records  Medications:  Reviewed and reconciled in nursing facility records    Review of Systems Pertinent positives as noted in HPI. All other systems reviewed and are negative. Objective:   BP (!) 148/68   Pulse 61   Temp 97.4 °F (36.3 °C)   Resp 18   Ht 5' 2\" (1.575 m)   Wt 157 lb (71.2 kg)   LMP  (LMP Unknown)   SpO2 97%   BMI 28.72 kg/m²     Physical Exam Constitutional: Lying in bed, well-developed, in no acute distress  ENT: Oropharynx normal, no nasal discharge, good dentition  Pulmonary/Chest:  breathing unlabored, chest excursion symmetrical, no use of accessory muscles, lung sounds clear, no shortness of breath, no dyspnea  Cardiovascular:  S1-S2 regular, no murmur, 2+ DP x 2, no edema  Abd/GI:  abdomen soft, round, non-distended, non-tender, no masses, active bowel sounds x 4 quadrants  MS/Extremities:  OSULLIVAN, ROM limited, abnormal gait, no clubbing, no cyanosis  Psych:  A/O x 3, cooperative with care, no anxiety, appropriate mood and affect,   Skin:  warm and dry, color normal, no lesions, no rashes, back incision is healed no signs and symptoms of infection. Diagnosis Orders   1. Compression fracture of T8 vertebra, initial encounter ([facility]     2. Status post laminectomy     3. Postoperative pain after spinal surgery     4.  Essential hypertension 5. Acute cystitis without hematuria     6. Multiple myeloma, remission status unspecified ([facility] [address]     7. Muscle spasm         Assessment and Plan:      1. Compression fracture of T8 vertebra, initial encounter ([address] / Status post laminectomy  Surgical incision is healed, no sign symptoms of infection. Leave open to air as ordered. Follow-up with Ortho as ordered. 2. Postoperative pain after spinal surgery  Pain is getting better, pain level 7-8 out of 10, continue oxycodone, fentanyl patch, tramadol as ordered. 3. Essential hypertension  Blood pressure within acceptable range, continue medications as ordered. 4. Acute cystitis without hematuria  On Cipro for urinary tract infection, denies dysuria, frequency, urgency. Continue antibiotic until completed. 5. Multiple myeloma, remission status unspecified ([address]  Biopsy performed during surgery, results returned with multiple myeloma. Has follow-up appointment tomorrow with Dr. Ivana Beck. Provide transportation. 6. Muscle spasm  On tizanidine for abdominal muscle spasms, is helping significantly, continue as ordered. Reviewed labs:  Yes    Time based visit:  time spent 25 minutes>50% spent with counseling and coordination of care. Reviewed with the resident current clinicalstatus, medications, activities and diet. Side effects, adverse effects of the medication prescribed, as well as treatment plan and result expectations. Discussed diagnostic results, risk and benefits of treatment options, importance of compliance with treatment options, risk factor reduction. I have reviewed the patient's medical history in detail and updated the computerized patient record. This note was partially generated using Dragon voice recognition system, and there may be some incorrect words, spellings, punctuation that were not noticed in checking the note before saving.     Jazmin Nevarez, APRN-CNP

## 2020-08-21 PROCEDURE — 77300 RADIATION THERAPY DOSE PLAN: CPT | Performed by: RADIOLOGY

## 2020-08-21 PROCEDURE — 77334 RADIATION TREATMENT AID(S): CPT | Performed by: RADIOLOGY

## 2020-08-21 PROCEDURE — 77295 3-D RADIOTHERAPY PLAN: CPT | Performed by: RADIOLOGY

## 2020-08-22 ENCOUNTER — TELEPHONE (OUTPATIENT)
Dept: FAMILY MEDICINE CLINIC | Age: 70
End: 2020-08-22

## 2020-08-22 ENCOUNTER — OFFICE VISIT (OUTPATIENT)
Dept: GERIATRIC MEDICINE | Age: 70
End: 2020-08-22
Payer: MEDICARE

## 2020-08-22 PROCEDURE — 99309 SBSQ NF CARE MODERATE MDM 30: CPT | Performed by: NURSE PRACTITIONER

## 2020-08-22 PROCEDURE — 1123F ACP DISCUSS/DSCN MKR DOCD: CPT | Performed by: NURSE PRACTITIONER

## 2020-08-24 ENCOUNTER — HOSPITAL ENCOUNTER (OUTPATIENT)
Dept: RADIATION ONCOLOGY | Age: 70
Discharge: HOME | End: 2020-08-24
Attending: RADIOLOGY
Payer: MEDICARE

## 2020-08-24 ENCOUNTER — APPOINTMENT (OUTPATIENT)
Dept: RADIATION ONCOLOGY | Age: 70
End: 2020-08-24
Attending: RADIOLOGY
Payer: MEDICARE

## 2020-08-24 LAB
ANION GAP SERPL CALCULATED.3IONS-SCNC: 12 MEQ/L (ref 9–15)
BUN BLDV-MCNC: 10 MG/DL (ref 8–23)
CALCIUM SERPL-MCNC: 9.9 MG/DL (ref 8.5–9.9)
CHLORIDE BLD-SCNC: 100 MEQ/L (ref 95–107)
CO2: 24 MEQ/L (ref 20–31)
CREAT SERPL-MCNC: 0.66 MG/DL (ref 0.5–0.9)
GFR AFRICAN AMERICAN: >60
GFR NON-AFRICAN AMERICAN: >60
GLUCOSE BLD-MCNC: 102 MG/DL (ref 70–99)
HCT VFR BLD CALC: 29.5 % (ref 37–47)
HEMOGLOBIN: 10 G/DL (ref 12–16)
MCH RBC QN AUTO: 33.1 PG (ref 27–31.3)
MCHC RBC AUTO-ENTMCNC: 34 % (ref 33–37)
MCV RBC AUTO: 97.5 FL (ref 82–100)
PDW BLD-RTO: 13.3 % (ref 11.5–14.5)
PLATELET # BLD: 521 K/UL (ref 130–400)
POTASSIUM SERPL-SCNC: 4.2 MEQ/L (ref 3.4–4.9)
RBC # BLD: 3.03 M/UL (ref 4.2–5.4)
SODIUM BLD-SCNC: 136 MEQ/L (ref 135–144)
WBC # BLD: 7.6 K/UL (ref 4.8–10.8)

## 2020-08-24 PROCEDURE — 99214 OFFICE O/P EST MOD 30 MIN: CPT | Performed by: RADIOLOGY

## 2020-08-24 PROCEDURE — 77412 RADIATION TX DELIVERY LVL 3: CPT | Performed by: RADIOLOGY

## 2020-08-24 PROCEDURE — 77280 THER RAD SIMULAJ FIELD SMPL: CPT | Performed by: RADIOLOGY

## 2020-08-24 NOTE — PROGRESS NOTES
Teaching & Instructions - Chest  General  Simulation  Initial Treatment  Self-Care Info  Nutrition  Social Service    Site-Specific  Side Effects  Cough Mgmt  Esophagitis/Pharyngitis  Fatigue Mgmt  Pain Mgmt  SOB Mgmt  Skin Care    Educational Handouts  Radiation Therapy & You  Site Specific Instructions  Radiation Oncology Dept Information  CBMS Program    Patient eager to learn, verbalized understanding of verbal education and handouts.

## 2020-08-25 ENCOUNTER — OFFICE VISIT (OUTPATIENT)
Dept: GERIATRIC MEDICINE | Age: 70
End: 2020-08-25
Payer: MEDICARE

## 2020-08-25 ENCOUNTER — HOSPITAL ENCOUNTER (OUTPATIENT)
Dept: RADIATION ONCOLOGY | Age: 70
Discharge: HOME | End: 2020-08-25
Attending: RADIOLOGY
Payer: MEDICARE

## 2020-08-25 VITALS
WEIGHT: 156 LBS | TEMPERATURE: 97.9 F | HEART RATE: 71 BPM | SYSTOLIC BLOOD PRESSURE: 128 MMHG | RESPIRATION RATE: 18 BRPM | BODY MASS INDEX: 28.53 KG/M2 | DIASTOLIC BLOOD PRESSURE: 66 MMHG | OXYGEN SATURATION: 98 %

## 2020-08-25 PROCEDURE — 77412 RADIATION TX DELIVERY LVL 3: CPT | Performed by: RADIOLOGY

## 2020-08-25 PROCEDURE — 99211 OFF/OP EST MAY X REQ PHY/QHP: CPT | Performed by: RADIOLOGY

## 2020-08-25 PROCEDURE — 99309 SBSQ NF CARE MODERATE MDM 30: CPT | Performed by: NURSE PRACTITIONER

## 2020-08-25 PROCEDURE — 1123F ACP DISCUSS/DSCN MKR DOCD: CPT | Performed by: NURSE PRACTITIONER

## 2020-08-26 ENCOUNTER — HOSPITAL ENCOUNTER (OUTPATIENT)
Dept: RADIATION ONCOLOGY | Age: 70
Discharge: HOME | End: 2020-08-26
Attending: RADIOLOGY
Payer: MEDICARE

## 2020-08-26 PROCEDURE — 77412 RADIATION TX DELIVERY LVL 3: CPT | Performed by: RADIOLOGY

## 2020-08-27 ENCOUNTER — APPOINTMENT (OUTPATIENT)
Dept: RADIATION ONCOLOGY | Age: 70
End: 2020-08-27
Attending: RADIOLOGY
Payer: MEDICARE

## 2020-08-27 PROCEDURE — 77412 RADIATION TX DELIVERY LVL 3: CPT | Performed by: RADIOLOGY

## 2020-08-28 ENCOUNTER — HOSPITAL ENCOUNTER (OUTPATIENT)
Dept: RADIATION ONCOLOGY | Age: 70
End: 2020-08-28
Attending: RADIOLOGY
Payer: MEDICARE

## 2020-08-28 VITALS
SYSTOLIC BLOOD PRESSURE: 108 MMHG | RESPIRATION RATE: 18 BRPM | HEART RATE: 65 BPM | DIASTOLIC BLOOD PRESSURE: 73 MMHG | TEMPERATURE: 98.4 F | OXYGEN SATURATION: 97 %

## 2020-08-28 LAB
ANION GAP SERPL CALCULATED.3IONS-SCNC: 9 MEQ/L (ref 9–15)
BASOPHILS ABSOLUTE: 0 K/UL (ref 0–0.2)
BASOPHILS RELATIVE PERCENT: 0.7 %
BUN BLDV-MCNC: 9 MG/DL (ref 8–23)
CALCIUM SERPL-MCNC: 9.3 MG/DL (ref 8.5–9.9)
CHLORIDE BLD-SCNC: 100 MEQ/L (ref 95–107)
CO2: 26 MEQ/L (ref 20–31)
CREAT SERPL-MCNC: 0.5 MG/DL (ref 0.5–0.9)
EOSINOPHILS ABSOLUTE: 0.1 K/UL (ref 0–0.7)
EOSINOPHILS RELATIVE PERCENT: 2.4 %
GFR AFRICAN AMERICAN: >60
GFR NON-AFRICAN AMERICAN: >60
GLUCOSE BLD-MCNC: 95 MG/DL (ref 70–99)
HCT VFR BLD CALC: 27.6 % (ref 37–47)
HEMOGLOBIN: 9.4 G/DL (ref 12–16)
LYMPHOCYTES ABSOLUTE: 0.6 K/UL (ref 1–4.8)
LYMPHOCYTES RELATIVE PERCENT: 12.3 %
MCH RBC QN AUTO: 32.8 PG (ref 27–31.3)
MCHC RBC AUTO-ENTMCNC: 34.1 % (ref 33–37)
MCV RBC AUTO: 96.3 FL (ref 82–100)
MONOCYTES ABSOLUTE: 0.3 K/UL (ref 0.2–0.8)
MONOCYTES RELATIVE PERCENT: 7 %
NEUTROPHILS ABSOLUTE: 3.5 K/UL (ref 1.4–6.5)
NEUTROPHILS RELATIVE PERCENT: 77.6 %
PDW BLD-RTO: 13.5 % (ref 11.5–14.5)
PLATELET # BLD: 411 K/UL (ref 130–400)
POTASSIUM SERPL-SCNC: 4.1 MEQ/L (ref 3.4–4.9)
RBC # BLD: 2.87 M/UL (ref 4.2–5.4)
SODIUM BLD-SCNC: 135 MEQ/L (ref 135–144)
WBC # BLD: 4.6 K/UL (ref 4.8–10.8)

## 2020-08-28 PROCEDURE — 77412 RADIATION TX DELIVERY LVL 3: CPT | Performed by: RADIOLOGY

## 2020-08-28 PROCEDURE — 77336 RADIATION PHYSICS CONSULT: CPT | Performed by: RADIOLOGY

## 2020-08-29 NOTE — PROGRESS NOTES
denies ever being on antidepressants and is not interested in pursuing that path at this time. Spoke with nursing staff regarding giving her updated information regarding her doctor appointments and radiation treatments times and schedules. Also informed them that resident is not interested in having psych consult at this time. We will continue to monitor closely. 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         Objective:   /73   Pulse 65   Temp 98.4 °F (36.9 °C)   Resp 18   LMP  (LMP Unknown)   SpO2 97%     Physical Exam Constitutional:  up in wheelchair, well-developed, in no acute distess  Eyes: PERRLA, conjunctiva/lids normal, sclera clear, EOMI  ENT: Oropharynx normal, no nasal discharge, mucous membranes moist  Neck: Neck supple, no JVD, no thyromegaly, no lymphadenopathy  Pulmonary/Chest:  breathing unlabored, chest excursion symmetrical, no use of accessory muscles, lung sounds clear, no shortness of breath, no dyspnea  Cardiovascular:  S1-S2 regular, no murmur, 2+ DP x 2, no edema  Abd/GI:  abdomen soft, round, non-distended, non-tender, no masses, active bowel sounds x 4 quadrants  MS/Extremities:  OSULLIVAN, ROM limited, abnormal gait, no clubbing, no cyanosis  Psych:  A/O x 3, cooperative with care, mild anxiety, appropriate mood and affect during our visit  Skin:  warm and dry, color normal, no lesions, rashes between breasts has resolved     Diagnosis Orders   1. Compression fracture of T8 vertebra, initial encounter ([facility] [address]     2. Status post laminectomy     3. Postoperative pain after spinal surgery     4. Essential hypertension     5. Acute cystitis without hematuria     6. Multiple myeloma, remission status unspecified ([facility] [address]     7. Muscle spasm         Assessment and Plan:      1. Compression fracture of T8 vertebra, initial encounter ([facility] [address] / Status post laminectomy  Incision line is healing well, no signs and symptoms of infection. Follow-up with Dr. Cynthia Emmanuel in on 9/1/2020 as ordered. 2. Postoperative pain after spinal surgery  On tramadol, oxycodone, fentanyl patch, Tylenol for postop pain. Pain level is 3 out of 10 currently but gets as high as 8-10 out of 10 on a scale of 10. Resident does alternate tramadol oxycodone and Tylenol. Continue medications as ordered. 3. Essential hypertension  Pressure within acceptable range, continue atenolol and other medications as ordered. 4. Acute cystitis without hematuria  Symptoms have resolved, antibiotic is complete. 5. Multiple myeloma, remission status unspecified [facility])  Resident has been going for radiation treatments, today was her second treatment, tolerating it well. Scheduled for PET scan next month. Continue follow-up visits with Dr. Sheila Skinner as ordered. 6. Muscle spasm  Tizanidine is very effective for managing muscle spasms per resident. Continue as ordered. Reviewed labs:  Yes    Time based visit:  time spent 30 minutes>50% spent with counseling and coordination of care. Reviewed with the resident current clinicalstatus, medications, activities and diet. Side effects, adverse effects of the medication prescribed, as well as treatment plan and result expectations. Discussed diagnostic results, other suggested diagnostic testing, prognosis, risk and benefits of treatment options, importance of compliance with treatment options, risk factor reduction.     I have reviewed the patient's medical history in detail and updated the computerized patient record. This note was partially generated using Dragon voice recognition system, and there may be some incorrect words, spellings, punctuation that were not noticed in checking the note before saving.     LADONNA Urias-CNP

## 2020-08-31 ENCOUNTER — HOSPITAL ENCOUNTER (OUTPATIENT)
Dept: RADIATION ONCOLOGY | Age: 70
Discharge: HOME | End: 2020-08-31
Attending: RADIOLOGY
Payer: MEDICARE

## 2020-08-31 ENCOUNTER — OFFICE VISIT (OUTPATIENT)
Dept: GERIATRIC MEDICINE | Age: 70
End: 2020-08-31
Payer: MEDICARE

## 2020-08-31 PROBLEM — N30.00 ACUTE CYSTITIS WITHOUT HEMATURIA: Status: RESOLVED | Noted: 2020-08-19 | Resolved: 2020-08-31

## 2020-08-31 PROBLEM — M62.81 MUSCLE WEAKNESS (GENERALIZED): Status: ACTIVE | Noted: 2020-08-31

## 2020-08-31 PROBLEM — R26.2 DIFFICULTY IN WALKING: Status: ACTIVE | Noted: 2020-08-31

## 2020-08-31 PROCEDURE — 77412 RADIATION TX DELIVERY LVL 3: CPT | Performed by: RADIOLOGY

## 2020-08-31 PROCEDURE — 1123F ACP DISCUSS/DSCN MKR DOCD: CPT | Performed by: INTERNAL MEDICINE

## 2020-08-31 PROCEDURE — 77417 THER RADIOLOGY PORT IMAGE(S): CPT | Performed by: RADIOLOGY

## 2020-08-31 PROCEDURE — 99308 SBSQ NF CARE LOW MDM 20: CPT | Performed by: INTERNAL MEDICINE

## 2020-08-31 NOTE — PROGRESS NOTES
Name: [address] [place], [place] [address]vd: [facility]   Mini bear  Date: 8/22/2020     Subjective:     Chief Complaint   Patient presents with    Follow-up     Evaluation of acute rehab progress and chronic conditions    Pain       HPI  Peggy Sargent is a 65 y.o. female being seen today for evaluation of acute rehab progress, pain, abdominal spasms, and chronic conditions. Resident admitted from [facility] status post pathological T8 compression fracture with total laminectomy. She also had decompression and fusion of her T6-10 thoracic vertebra in addition to total T8 laminectomy. She has medical history of hypertension, osteopenia, back pain, insomnia, spinal stenosis, and anxiety. Surgical incision to her back is healed, no signs of infection. Today she worked with physical therapy and was able to get out of bed and into a wheelchair. She is able to take steps and weight-bear on her extremities, but is not walking any distances, did a few exercises in her room. Resident does need assistance with ADLs, mobility, and transfers. Generalized muscle weakness persists. She does complain of significant pain with movement 7-8 out of 10 on a scale of 10. She has a fentanyl patch, oxycodone, tramadol, Tylenol for pain management. Resident seems to prefer the oxycodone and tramadol, alternating doses for pain. Does not want to increase the dose on the fentanyl patch at this time. Vital signs are stable, no fever. Postop biopsy revealed multiple myeloma; radiation treatment will begin next week. Resident denies complaints of chest pain, chest palpitations, irregular heartbeat, lightheadedness, dizziness, nausea, vomiting, diarrhea, constipation. She does complain of spasms in her abdominal area that is related to her back pain, is on tizanidine for treatment which is effective. States that spasms have improved over the last few days.   Chronic conditions are at baseline, she denies anxiety at this time. Continue PT OT as ordered. 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 All other systems reviewed and are negative. Objective:   /66   Pulse 71   Temp 97.9 °F (36.6 °C)   Resp 18   Wt 156 lb (70.8 kg)   LMP  (LMP Unknown)   SpO2 98%   BMI 28.53 kg/m²     Physical Exam Constitutional: Lying in bed, well-developed, in no acute distress  ENT: Oropharynx normal, no nasal discharge, good dentition  Pulmonary/Chest:  breathing unlabored, chest excursion symmetrical, no use of accessory muscles, lung sounds clear, no shortness of breath, no dyspnea  Cardiovascular:  S1-S2 regular, no murmur, 2+ DP x 2, no edema  Abd/GI:  abdomen soft, round, non-distended, non-tender, no masses, active bowel sounds x 4 quadrants  MS/Extremities:  OSULLIVAN, ROM limited, abnormal gait, no clubbing, no cyanosis  Psych:  A/O x 3, cooperative with care, no anxiety, appropriate mood and affect,   Skin:  warm and dry, color normal, no lesions, no rashes, back incision is healed no signs and symptoms of infection. Diagnosis Orders   1. Difficulty in walking     2. Muscle weakness (generalized)     3. Compression fracture of T8 vertebra, initial encounter ([facility] [address]     4. Status post laminectomy     5. Postoperative pain after spinal surgery     6. Essential hypertension     7.  Muscle spasm Assessment and Plan:      1. Difficulty walking / Generalized muscle weakness  Resident continues to have difficulty walking due to pain and muscle spasms. Needs PT and OT to continue with a focus on gait, balance, endurance, lower limb strengthening, transfers, ADLs. 2. Compression fracture of T8 vertebra, initial encounter ([facility] [address] / Status post laminectomy  Surgical incision is healed, no sign symptoms of infection. Leave open to air as ordered. Follow-up with Ortho as ordered. 3. Postoperative pain after spinal surgery  Pain is getting better, pain level 7-8 out of 10, continue oxycodone, fentanyl patch, tramadol as ordered. 4. Essential hypertension  Blood pressure within acceptable range, continue atenolol as ordered. 5. Muscle spasm  On tizanidine for abdominal muscle spasms, is helping significantly, continue as ordered. Reviewed labs:  Yes    Time based visit:  time spent 25 minutes>50% spent with counseling and coordination of care. Reviewed with the resident current clinicalstatus, medications, activities and diet. Appetite is just been fair, have been encouraging resident to eat small frequent meals. Discussed other methods to help with pain management such as relaxation techniques. Side effects, adverse effects of the medication prescribed (constipation associated with opiate use), as well as treatment plan and result expectations. Discussed diagnostic results, risk and benefits of treatment options, importance of compliance with treatment options, risk factor reduction. I have reviewed the patient's medical history in detail and updated the computerized patient record. This note was partially generated using Dragon voice recognition system, and there may be some incorrect words, spellings, punctuation that were not noticed in checking the note before saving.     Dianne Galindo, APRN-CNP

## 2020-09-01 ENCOUNTER — HOSPITAL ENCOUNTER (OUTPATIENT)
Dept: RADIATION ONCOLOGY | Age: 70
Discharge: HOME | End: 2020-09-01
Attending: RADIOLOGY
Payer: MEDICARE

## 2020-09-01 PROCEDURE — 99211 OFF/OP EST MAY X REQ PHY/QHP: CPT | Performed by: RADIOLOGY

## 2020-09-01 PROCEDURE — 77412 RADIATION TX DELIVERY LVL 3: CPT | Performed by: RADIOLOGY

## 2020-09-01 RX ORDER — TRAMADOL HYDROCHLORIDE 50 MG/1
50 TABLET ORAL EVERY 6 HOURS PRN
COMMUNITY
End: 2020-09-21 | Stop reason: SDUPTHER

## 2020-09-01 RX ORDER — OXYCODONE HYDROCHLORIDE 5 MG/1
5 TABLET ORAL EVERY 4 HOURS PRN
COMMUNITY
End: 2020-10-08

## 2020-09-02 ENCOUNTER — HOSPITAL ENCOUNTER (OUTPATIENT)
Dept: RADIATION ONCOLOGY | Age: 70
Discharge: HOME | End: 2020-09-02
Attending: RADIOLOGY
Payer: MEDICARE

## 2020-09-02 PROCEDURE — 77412 RADIATION TX DELIVERY LVL 3: CPT | Performed by: RADIOLOGY

## 2020-09-02 NOTE — PROGRESS NOTES
[facility]    Seen for followup from recent admission for her compression fracture, myelopathy, weakness. SUBJECTIVE:  This patient was seen in her room today. Her pain is clinically well controlled, but it is improving. She is making gains in physical therapy. Patient has not suffered any neuropathic type pain. No new falls. Patient is demonstrating ongoing improvement in her functional status. REVIEW OF SYSTEMS:  Denies chest pain, palpitations, nausea, vomiting, emesis. Denies headaches. Denies new back pain. Rest of the 14-point review of systems unremarkable. OBJECTIVE:  She appeared clinically stable. Pupils are equal and reactive. Oral mucosa moist.  No thrush. He is robust.  Neck was supple. Chest showed no crackles. No wheezing. Cardiovascular exam showed a regular rate. Abdomen soft, nontender. Extremities showed +1 dorsal pedal pulse. Skin showed no evidence of rash. Lymph:  No axillary or inguinal lymphadenopathy.     Past Medical History:   Diagnosis Date    Acne rosacea     Actinic dermatitis     biopsy proven 1/2018    Acute cystitis without hematuria 8/19/2020    Anxiety     Benign essential hypertension     Dermatitis     History of episode of anxiety 2017    spouse was ill    History of pathological fracture of vertebra 08/2020    T8, laminectomy-decompression-fusion Dr Cecile Gilbert, 08/10/2020    Osteopenia     Plasmacytoma ([address] 08/2020    Dr Susana Cornelius    Urinary incontinence      Past Surgical History:   Procedure Laterality Date    CATARACT REMOVAL      bilateral    LUMBAR FUSION N/A 8/10/2020    T7-8-9  DECOMPRESSION POSTEROLATERAL FUSION PEDICLE SCREWS T6-7-10-11 performed by Alycia Stern MD at [facility] History     Tobacco Use    Smoking status: Never Smoker    Smokeless tobacco: Never Used   Substance Use Topics    Alcohol use: No     Alcohol/week: 0.0 standard drinks    Drug use: No     Current Outpatient Medications on File Prior to Visit   Medication Sig Dispense Refill    sennosides-docusate sodium (SENOKOT-S) 8.6-50 MG tablet Take 1 tablet by mouth daily      vitamin D (ERGOCALCIFEROL) 1.25 MG (91889 UT) CAPS capsule Take 1 capsule orally once weekly x 4 weeks, then 1 capsule orally once monthly 12 capsule 1    tiZANidine (ZANAFLEX) 2 MG tablet Take 1 tablet by mouth every 6 hours as needed (muscle spasms) 40 tablet 0    traZODone (DESYREL) 50 MG tablet Take 0.5 tablets by mouth nightly as needed for Sleep 15 tablet 0    fentaNYL (DURAGESIC) 12 MCG/HR Place 1 patch onto the skin every 72 hours for 30 days. 10 patch 0    calcium carbonate (TUMS) 500 MG chewable tablet Take 1 tablet by mouth 3 times daily as needed for Heartburn 30 tablet 0    melatonin 1 MG tablet Take 5 tablets by mouth nightly as needed for Sleep 30 tablet 0    calcium carbonate (OSCAL) 500 MG TABS tablet Take 500 mg by mouth daily      atenolol (TENORMIN) 50 MG tablet Take 1 tablet by mouth daily 90 tablet 1    alendronate (FOSAMAX) 70 MG tablet Take 1 tablet by mouth every 7 days 4 tablet 12    acetaminophen (TYLENOL) 650 MG CR tablet Take 650 mg by mouth every 8 hours as needed for Pain       No current facility-administered medications on file prior to visit. Family History   Problem Relation Age of Onset    Diabetes Mother     Hypertension Mother     Hypertension Father     Cancer Sister         multiple myeloma    Breast Cancer Other      No results found for: LABA1C  No results found for: EAG      ASSESSMENT AND PLAN:  1. Compression fracture. Patient is clinically stable. No new pain crisis. Continue supportive care. Continue with physical therapy and occupational therapy. Follow up with Orthopedics as needed. 2.   Constipation. Patient's bowel regimen has improved her output, wean off as able. Continue with nutritional support.         Electronically Signed By: Do Fitzgerald M.D. on 09/02/2020 08:38:39  ______________________________  HUMBERTO Blank/[id number]  D: 08/31/2020 18:02:56  T: 08/31/2020 19:04:11    cc: Jed Naidu

## 2020-09-03 ENCOUNTER — OFFICE VISIT (OUTPATIENT)
Dept: GERIATRIC MEDICINE | Age: 70
End: 2020-09-03
Payer: MEDICARE

## 2020-09-03 ENCOUNTER — HOSPITAL ENCOUNTER (OUTPATIENT)
Dept: RADIATION ONCOLOGY | Age: 70
Discharge: HOME | End: 2020-09-03
Attending: RADIOLOGY
Payer: MEDICARE

## 2020-09-03 PROCEDURE — 99309 SBSQ NF CARE MODERATE MDM 30: CPT | Performed by: NURSE PRACTITIONER

## 2020-09-03 PROCEDURE — 77412 RADIATION TX DELIVERY LVL 3: CPT | Performed by: RADIOLOGY

## 2020-09-03 PROCEDURE — 1123F ACP DISCUSS/DSCN MKR DOCD: CPT | Performed by: NURSE PRACTITIONER

## 2020-09-04 ENCOUNTER — HOSPITAL ENCOUNTER (OUTPATIENT)
Dept: RADIATION ONCOLOGY | Age: 70
Discharge: HOME | End: 2020-09-04
Attending: RADIOLOGY
Payer: MEDICARE

## 2020-09-04 ENCOUNTER — HOSPITAL ENCOUNTER (OUTPATIENT)
Dept: CT IMAGING | Age: 70
Discharge: HOME | End: 2020-09-06
Payer: MEDICARE

## 2020-09-04 PROCEDURE — 77307 TELETHX ISODOSE PLAN CPLX: CPT | Performed by: RADIOLOGY

## 2020-09-04 PROCEDURE — 77334 RADIATION TREATMENT AID(S): CPT | Performed by: RADIOLOGY

## 2020-09-04 PROCEDURE — 77336 RADIATION PHYSICS CONSULT: CPT | Performed by: RADIOLOGY

## 2020-09-04 PROCEDURE — 78815 PET IMAGE W/CT SKULL-THIGH: CPT

## 2020-09-04 PROCEDURE — A9552 F18 FDG: HCPCS | Performed by: RADIOLOGY

## 2020-09-04 PROCEDURE — 3430000000 HC RX DIAGNOSTIC RADIOPHARMACEUTICAL: Performed by: RADIOLOGY

## 2020-09-04 PROCEDURE — 77412 RADIATION TX DELIVERY LVL 3: CPT | Performed by: RADIOLOGY

## 2020-09-04 RX ORDER — FLUDEOXYGLUCOSE F 18 200 MCI/ML
17.17 INJECTION, SOLUTION INTRAVENOUS
Status: COMPLETED | OUTPATIENT
Start: 2020-09-04 | End: 2020-09-04

## 2020-09-04 RX ADMIN — FLUDEOXYGLUCOSE F 18 17.17 MILLICURIE: 200 INJECTION, SOLUTION INTRAVENOUS at 09:59

## 2020-09-08 ENCOUNTER — HOSPITAL ENCOUNTER (OUTPATIENT)
Dept: RADIATION ONCOLOGY | Age: 70
Discharge: HOME | End: 2020-09-08
Attending: RADIOLOGY
Payer: MEDICARE

## 2020-09-08 LAB
ANION GAP SERPL CALCULATED.3IONS-SCNC: 9 MEQ/L (ref 9–15)
ANISOCYTOSIS: ABNORMAL
BASOPHILS ABSOLUTE: 0 K/UL (ref 0–0.2)
BASOPHILS RELATIVE PERCENT: 0.3 %
BUN BLDV-MCNC: 9 MG/DL (ref 8–23)
CALCIUM SERPL-MCNC: 9.5 MG/DL (ref 8.5–9.9)
CHLORIDE BLD-SCNC: 98 MEQ/L (ref 95–107)
CO2: 27 MEQ/L (ref 20–31)
CREAT SERPL-MCNC: 0.57 MG/DL (ref 0.5–0.9)
EOSINOPHILS ABSOLUTE: 0.1 K/UL (ref 0–0.7)
EOSINOPHILS RELATIVE PERCENT: 2 %
GFR AFRICAN AMERICAN: >60
GFR NON-AFRICAN AMERICAN: >60
GLUCOSE BLD-MCNC: 98 MG/DL (ref 70–99)
HCT VFR BLD CALC: 28.3 % (ref 37–47)
HEMOGLOBIN: 9.7 G/DL (ref 12–16)
LYMPHOCYTES ABSOLUTE: 0.2 K/UL (ref 1–4.8)
LYMPHOCYTES RELATIVE PERCENT: 6 %
MACROCYTES: ABNORMAL
MCH RBC QN AUTO: 33 PG (ref 27–31.3)
MCHC RBC AUTO-ENTMCNC: 34.4 % (ref 33–37)
MCV RBC AUTO: 96.1 FL (ref 82–100)
METAMYELOCYTES RELATIVE PERCENT: 1 %
MICROCYTES: ABNORMAL
MONOCYTES ABSOLUTE: 0.2 K/UL (ref 0.2–0.8)
MONOCYTES RELATIVE PERCENT: 6.7 %
NEUTROPHILS ABSOLUTE: 2.8 K/UL (ref 1.4–6.5)
NEUTROPHILS RELATIVE PERCENT: 85 %
OVALOCYTES: ABNORMAL
PDW BLD-RTO: 13.6 % (ref 11.5–14.5)
PLATELET # BLD: 233 K/UL (ref 130–400)
PLATELET SLIDE REVIEW: NORMAL
POIKILOCYTES: ABNORMAL
POTASSIUM SERPL-SCNC: 4.8 MEQ/L (ref 3.4–4.9)
RBC # BLD: 2.95 M/UL (ref 4.2–5.4)
SMUDGE CELLS: 3.8
SODIUM BLD-SCNC: 134 MEQ/L (ref 135–144)
WBC # BLD: 3.3 K/UL (ref 4.8–10.8)

## 2020-09-08 PROCEDURE — 77280 THER RAD SIMULAJ FIELD SMPL: CPT | Performed by: RADIOLOGY

## 2020-09-08 PROCEDURE — 77412 RADIATION TX DELIVERY LVL 3: CPT | Performed by: RADIOLOGY

## 2020-09-09 ENCOUNTER — HOSPITAL ENCOUNTER (OUTPATIENT)
Dept: RADIATION ONCOLOGY | Age: 70
Discharge: HOME | End: 2020-09-09
Attending: RADIOLOGY
Payer: MEDICARE

## 2020-09-09 PROCEDURE — 77412 RADIATION TX DELIVERY LVL 3: CPT | Performed by: RADIOLOGY

## 2020-09-10 ENCOUNTER — APPOINTMENT (OUTPATIENT)
Dept: RADIATION ONCOLOGY | Age: 70
End: 2020-09-10
Attending: RADIOLOGY
Payer: MEDICARE

## 2020-09-10 PROCEDURE — 77412 RADIATION TX DELIVERY LVL 3: CPT | Performed by: RADIOLOGY

## 2020-09-11 ENCOUNTER — APPOINTMENT (OUTPATIENT)
Dept: RADIATION ONCOLOGY | Age: 70
End: 2020-09-11
Attending: RADIOLOGY
Payer: MEDICARE

## 2020-09-11 LAB — PREALBUMIN: 14.5 MG/DL (ref 20–40)

## 2020-09-11 PROCEDURE — 77412 RADIATION TX DELIVERY LVL 3: CPT | Performed by: RADIOLOGY

## 2020-09-11 RX ORDER — OXYCODONE HYDROCHLORIDE 5 MG/1
5 TABLET ORAL EVERY 4 HOURS PRN
OUTPATIENT
Start: 2020-09-11

## 2020-09-14 ENCOUNTER — HOSPITAL ENCOUNTER (OUTPATIENT)
Dept: GENERAL RADIOLOGY | Age: 70
Setting detail: OUTPATIENT SURGERY
Discharge: HOME | End: 2020-09-16
Attending: NEUROLOGICAL SURGERY
Payer: MEDICARE

## 2020-09-14 ENCOUNTER — APPOINTMENT (OUTPATIENT)
Dept: RADIATION ONCOLOGY | Age: 70
End: 2020-09-14
Attending: RADIOLOGY
Payer: MEDICARE

## 2020-09-14 ENCOUNTER — ANESTHESIA (OUTPATIENT)
Dept: OPERATING ROOM | Age: 70
End: 2020-09-14
Payer: MEDICARE

## 2020-09-14 ENCOUNTER — ANESTHESIA EVENT (OUTPATIENT)
Dept: OPERATING ROOM | Age: 70
End: 2020-09-14
Payer: MEDICARE

## 2020-09-14 ENCOUNTER — HOSPITAL ENCOUNTER (OUTPATIENT)
Age: 70
Setting detail: OUTPATIENT SURGERY
Discharge: HOME | End: 2020-09-14
Attending: NEUROLOGICAL SURGERY | Admitting: NEUROLOGICAL SURGERY
Payer: MEDICARE

## 2020-09-14 VITALS
HEIGHT: 62 IN | OXYGEN SATURATION: 100 % | DIASTOLIC BLOOD PRESSURE: 67 MMHG | HEART RATE: 61 BPM | RESPIRATION RATE: 18 BRPM | WEIGHT: 147 LBS | SYSTOLIC BLOOD PRESSURE: 158 MMHG | BODY MASS INDEX: 27.05 KG/M2 | TEMPERATURE: 97.1 F

## 2020-09-14 VITALS — DIASTOLIC BLOOD PRESSURE: 60 MMHG | OXYGEN SATURATION: 100 % | SYSTOLIC BLOOD PRESSURE: 110 MMHG

## 2020-09-14 LAB — SARS-COV-2, NAAT: NOT DETECTED

## 2020-09-14 PROCEDURE — 2500000003 HC RX 250 WO HCPCS: Performed by: NURSE ANESTHETIST, CERTIFIED REGISTERED

## 2020-09-14 PROCEDURE — C1894 INTRO/SHEATH, NON-LASER: HCPCS | Performed by: NEUROLOGICAL SURGERY

## 2020-09-14 PROCEDURE — 6370000000 HC RX 637 (ALT 250 FOR IP): Performed by: STUDENT IN AN ORGANIZED HEALTH CARE EDUCATION/TRAINING PROGRAM

## 2020-09-14 PROCEDURE — 6360000002 HC RX W HCPCS: Performed by: STUDENT IN AN ORGANIZED HEALTH CARE EDUCATION/TRAINING PROGRAM

## 2020-09-14 PROCEDURE — U0002 COVID-19 LAB TEST NON-CDC: HCPCS

## 2020-09-14 PROCEDURE — 3700000001 HC ADD 15 MINUTES (ANESTHESIA): Performed by: NEUROLOGICAL SURGERY

## 2020-09-14 PROCEDURE — 7100000000 HC PACU RECOVERY - FIRST 15 MIN: Performed by: NEUROLOGICAL SURGERY

## 2020-09-14 PROCEDURE — 6360000002 HC RX W HCPCS: Performed by: NURSE ANESTHETIST, CERTIFIED REGISTERED

## 2020-09-14 PROCEDURE — 2709999900 HC NON-CHARGEABLE SUPPLY: Performed by: NEUROLOGICAL SURGERY

## 2020-09-14 PROCEDURE — 6360000004 HC RX CONTRAST MEDICATION: Performed by: NEUROLOGICAL SURGERY

## 2020-09-14 PROCEDURE — 3700000000 HC ANESTHESIA ATTENDED CARE: Performed by: NEUROLOGICAL SURGERY

## 2020-09-14 PROCEDURE — 6360000002 HC RX W HCPCS: Performed by: NEUROLOGICAL SURGERY

## 2020-09-14 PROCEDURE — 7100000010 HC PHASE II RECOVERY - FIRST 15 MIN: Performed by: NEUROLOGICAL SURGERY

## 2020-09-14 PROCEDURE — 3209999900 FLUORO FOR SURGICAL PROCEDURES

## 2020-09-14 PROCEDURE — 3600000004 HC SURGERY LEVEL 4 BASE: Performed by: NEUROLOGICAL SURGERY

## 2020-09-14 PROCEDURE — 2580000003 HC RX 258: Performed by: NEUROLOGICAL SURGERY

## 2020-09-14 PROCEDURE — 7100000011 HC PHASE II RECOVERY - ADDTL 15 MIN: Performed by: NEUROLOGICAL SURGERY

## 2020-09-14 PROCEDURE — 88342 IMHCHEM/IMCYTCHM 1ST ANTB: CPT

## 2020-09-14 PROCEDURE — 88305 TISSUE EXAM BY PATHOLOGIST: CPT

## 2020-09-14 PROCEDURE — 88341 IMHCHEM/IMCYTCHM EA ADD ANTB: CPT

## 2020-09-14 PROCEDURE — 2720000010 HC SURG SUPPLY STERILE: Performed by: NEUROLOGICAL SURGERY

## 2020-09-14 PROCEDURE — C1713 ANCHOR/SCREW BN/BN,TIS/BN: HCPCS | Performed by: NEUROLOGICAL SURGERY

## 2020-09-14 PROCEDURE — 7100000001 HC PACU RECOVERY - ADDTL 15 MIN: Performed by: NEUROLOGICAL SURGERY

## 2020-09-14 PROCEDURE — 2500000003 HC RX 250 WO HCPCS: Performed by: STUDENT IN AN ORGANIZED HEALTH CARE EDUCATION/TRAINING PROGRAM

## 2020-09-14 PROCEDURE — 3600000014 HC SURGERY LEVEL 4 ADDTL 15MIN: Performed by: NEUROLOGICAL SURGERY

## 2020-09-14 PROCEDURE — 2580000003 HC RX 258: Performed by: STUDENT IN AN ORGANIZED HEALTH CARE EDUCATION/TRAINING PROGRAM

## 2020-09-14 PROCEDURE — 88311 DECALCIFY TISSUE: CPT

## 2020-09-14 PROCEDURE — 76942 ECHO GUIDE FOR BIOPSY: CPT | Performed by: NURSE ANESTHETIST, CERTIFIED REGISTERED

## 2020-09-14 DEVICE — BONE CEMENT C01B HV-R WITH MIXER  US
Type: IMPLANTABLE DEVICE | Site: THORACIC | Status: FUNCTIONAL
Brand: KYPHON® HV-R® BONE CEMENT AND KYPHON® MIXER PACK

## 2020-09-14 RX ORDER — LIDOCAINE HYDROCHLORIDE AND EPINEPHRINE BITARTRATE 20; .01 MG/ML; MG/ML
INJECTION, SOLUTION SUBCUTANEOUS PRN
Status: DISCONTINUED | OUTPATIENT
Start: 2020-09-14 | End: 2020-09-14 | Stop reason: SDUPTHER

## 2020-09-14 RX ORDER — METOCLOPRAMIDE HYDROCHLORIDE 5 MG/ML
10 INJECTION INTRAMUSCULAR; INTRAVENOUS
Status: DISCONTINUED | OUTPATIENT
Start: 2020-09-14 | End: 2020-09-14 | Stop reason: HOSPADM

## 2020-09-14 RX ORDER — LIDOCAINE HYDROCHLORIDE 20 MG/ML
INJECTION, SOLUTION INTRAVENOUS PRN
Status: DISCONTINUED | OUTPATIENT
Start: 2020-09-14 | End: 2020-09-14 | Stop reason: SDUPTHER

## 2020-09-14 RX ORDER — SODIUM CHLORIDE 0.9 % (FLUSH) 0.9 %
10 SYRINGE (ML) INJECTION PRN
Status: DISCONTINUED | OUTPATIENT
Start: 2020-09-14 | End: 2020-09-14 | Stop reason: HOSPADM

## 2020-09-14 RX ORDER — MAGNESIUM HYDROXIDE 1200 MG/15ML
LIQUID ORAL PRN
Status: DISCONTINUED | OUTPATIENT
Start: 2020-09-14 | End: 2020-09-14 | Stop reason: ALTCHOICE

## 2020-09-14 RX ORDER — ROCURONIUM BROMIDE 10 MG/ML
INJECTION, SOLUTION INTRAVENOUS PRN
Status: DISCONTINUED | OUTPATIENT
Start: 2020-09-14 | End: 2020-09-14 | Stop reason: SDUPTHER

## 2020-09-14 RX ORDER — ONDANSETRON 2 MG/ML
4 INJECTION INTRAMUSCULAR; INTRAVENOUS
Status: DISCONTINUED | OUTPATIENT
Start: 2020-09-14 | End: 2020-09-14 | Stop reason: HOSPADM

## 2020-09-14 RX ORDER — MAGNESIUM HYDROXIDE 1200 MG/15ML
LIQUID ORAL CONTINUOUS PRN
Status: COMPLETED | OUTPATIENT
Start: 2020-09-14 | End: 2020-09-14

## 2020-09-14 RX ORDER — FENTANYL CITRATE 50 UG/ML
50 INJECTION, SOLUTION INTRAMUSCULAR; INTRAVENOUS EVERY 10 MIN PRN
Status: DISCONTINUED | OUTPATIENT
Start: 2020-09-14 | End: 2020-09-14 | Stop reason: HOSPADM

## 2020-09-14 RX ORDER — CEFAZOLIN SODIUM 2 G/50ML
2 SOLUTION INTRAVENOUS
Status: COMPLETED | OUTPATIENT
Start: 2020-09-14 | End: 2020-09-14

## 2020-09-14 RX ORDER — ONDANSETRON 2 MG/ML
INJECTION INTRAMUSCULAR; INTRAVENOUS PRN
Status: DISCONTINUED | OUTPATIENT
Start: 2020-09-14 | End: 2020-09-14 | Stop reason: SDUPTHER

## 2020-09-14 RX ORDER — ROPIVACAINE HYDROCHLORIDE 5 MG/ML
INJECTION, SOLUTION EPIDURAL; INFILTRATION; PERINEURAL
Status: COMPLETED | OUTPATIENT
Start: 2020-09-14 | End: 2020-09-14

## 2020-09-14 RX ORDER — DEXAMETHASONE SODIUM PHOSPHATE 4 MG/ML
INJECTION, SOLUTION INTRA-ARTICULAR; INTRALESIONAL; INTRAMUSCULAR; INTRAVENOUS; SOFT TISSUE PRN
Status: DISCONTINUED | OUTPATIENT
Start: 2020-09-14 | End: 2020-09-14 | Stop reason: SDUPTHER

## 2020-09-14 RX ORDER — PROPOFOL 10 MG/ML
INJECTION, EMULSION INTRAVENOUS PRN
Status: DISCONTINUED | OUTPATIENT
Start: 2020-09-14 | End: 2020-09-14 | Stop reason: SDUPTHER

## 2020-09-14 RX ORDER — MEPERIDINE HYDROCHLORIDE 25 MG/ML
12.5 INJECTION INTRAMUSCULAR; INTRAVENOUS; SUBCUTANEOUS EVERY 5 MIN PRN
Status: DISCONTINUED | OUTPATIENT
Start: 2020-09-14 | End: 2020-09-14 | Stop reason: HOSPADM

## 2020-09-14 RX ORDER — SODIUM CHLORIDE, SODIUM LACTATE, POTASSIUM CHLORIDE, CALCIUM CHLORIDE 600; 310; 30; 20 MG/100ML; MG/100ML; MG/100ML; MG/100ML
INJECTION, SOLUTION INTRAVENOUS CONTINUOUS
Status: DISCONTINUED | OUTPATIENT
Start: 2020-09-14 | End: 2020-09-14 | Stop reason: HOSPADM

## 2020-09-14 RX ORDER — DIPHENHYDRAMINE HYDROCHLORIDE 50 MG/ML
12.5 INJECTION INTRAMUSCULAR; INTRAVENOUS
Status: DISCONTINUED | OUTPATIENT
Start: 2020-09-14 | End: 2020-09-14 | Stop reason: HOSPADM

## 2020-09-14 RX ORDER — ATENOLOL 50 MG/1
50 TABLET ORAL DAILY
Status: DISCONTINUED | OUTPATIENT
Start: 2020-09-14 | End: 2020-09-14 | Stop reason: HOSPADM

## 2020-09-14 RX ORDER — LIDOCAINE HYDROCHLORIDE 10 MG/ML
1 INJECTION, SOLUTION EPIDURAL; INFILTRATION; INTRACAUDAL; PERINEURAL
Status: DISCONTINUED | OUTPATIENT
Start: 2020-09-14 | End: 2020-09-14 | Stop reason: HOSPADM

## 2020-09-14 RX ORDER — KETAMINE HYDROCHLORIDE 100 MG/ML
INJECTION, SOLUTION INTRAMUSCULAR; INTRAVENOUS PRN
Status: DISCONTINUED | OUTPATIENT
Start: 2020-09-14 | End: 2020-09-14 | Stop reason: SDUPTHER

## 2020-09-14 RX ORDER — SODIUM CHLORIDE 0.9 % (FLUSH) 0.9 %
10 SYRINGE (ML) INJECTION EVERY 12 HOURS SCHEDULED
Status: DISCONTINUED | OUTPATIENT
Start: 2020-09-14 | End: 2020-09-14 | Stop reason: HOSPADM

## 2020-09-14 RX ORDER — MIDAZOLAM HYDROCHLORIDE 1 MG/ML
INJECTION INTRAMUSCULAR; INTRAVENOUS PRN
Status: DISCONTINUED | OUTPATIENT
Start: 2020-09-14 | End: 2020-09-14 | Stop reason: SDUPTHER

## 2020-09-14 RX ORDER — OMEPRAZOLE 20 MG/1
20 CAPSULE, DELAYED RELEASE ORAL DAILY
COMMUNITY
End: 2020-10-08

## 2020-09-14 RX ORDER — FENTANYL CITRATE 50 UG/ML
INJECTION, SOLUTION INTRAMUSCULAR; INTRAVENOUS PRN
Status: DISCONTINUED | OUTPATIENT
Start: 2020-09-14 | End: 2020-09-14 | Stop reason: SDUPTHER

## 2020-09-14 RX ADMIN — SUGAMMADEX 200 MG: 100 INJECTION, SOLUTION INTRAVENOUS at 12:54

## 2020-09-14 RX ADMIN — PHENYLEPHRINE HYDROCHLORIDE 100 MCG: 10 INJECTION INTRAVENOUS at 12:44

## 2020-09-14 RX ADMIN — LIDOCAINE HYDROCHLORIDE,EPINEPHRINE BITARTRATE 10 ML: 20; .01 INJECTION, SOLUTION INFILTRATION; PERINEURAL at 11:13

## 2020-09-14 RX ADMIN — SODIUM CHLORIDE, POTASSIUM CHLORIDE, SODIUM LACTATE AND CALCIUM CHLORIDE: 600; 310; 30; 20 INJECTION, SOLUTION INTRAVENOUS at 12:21

## 2020-09-14 RX ADMIN — MIDAZOLAM HYDROCHLORIDE 2 MG: 2 INJECTION, SOLUTION INTRAMUSCULAR; INTRAVENOUS at 11:46

## 2020-09-14 RX ADMIN — CEFAZOLIN SODIUM 2 G: 2 SOLUTION INTRAVENOUS at 11:41

## 2020-09-14 RX ADMIN — FENTANYL CITRATE 50 MCG: 50 INJECTION, SOLUTION INTRAMUSCULAR; INTRAVENOUS at 11:46

## 2020-09-14 RX ADMIN — ONDANSETRON 4 MG: 2 INJECTION INTRAMUSCULAR; INTRAVENOUS at 11:59

## 2020-09-14 RX ADMIN — SODIUM CHLORIDE, POTASSIUM CHLORIDE, SODIUM LACTATE AND CALCIUM CHLORIDE: 600; 310; 30; 20 INJECTION, SOLUTION INTRAVENOUS at 11:13

## 2020-09-14 RX ADMIN — LIDOCAINE HYDROCHLORIDE 50 MG: 20 INJECTION, SOLUTION INTRAVENOUS at 11:50

## 2020-09-14 RX ADMIN — ATENOLOL 50 MG: 50 TABLET ORAL at 11:38

## 2020-09-14 RX ADMIN — MIDAZOLAM HYDROCHLORIDE 2 MG: 2 INJECTION, SOLUTION INTRAMUSCULAR; INTRAVENOUS at 11:06

## 2020-09-14 RX ADMIN — FENTANYL CITRATE 50 MCG: 50 INJECTION, SOLUTION INTRAMUSCULAR; INTRAVENOUS at 12:30

## 2020-09-14 RX ADMIN — KETAMINE HYDROCHLORIDE 25 MG: 100 INJECTION INTRAMUSCULAR; INTRAVENOUS at 11:47

## 2020-09-14 RX ADMIN — DEXAMETHASONE SODIUM PHOSPHATE 4 MG: 4 INJECTION INTRA-ARTICULAR; INTRALESIONAL; INTRAMUSCULAR; INTRAVENOUS; SOFT TISSUE at 11:59

## 2020-09-14 RX ADMIN — PROPOFOL 100 MG: 10 INJECTION, EMULSION INTRAVENOUS at 11:50

## 2020-09-14 RX ADMIN — ROPIVACAINE HYDROCHLORIDE 30 ML: 5 INJECTION, SOLUTION EPIDURAL; INFILTRATION; PERINEURAL at 11:12

## 2020-09-14 RX ADMIN — ROCURONIUM BROMIDE 25 MG: 10 INJECTION INTRAVENOUS at 11:50

## 2020-09-14 ASSESSMENT — PULMONARY FUNCTION TESTS
PIF_VALUE: 0
PIF_VALUE: 17
PIF_VALUE: 18
PIF_VALUE: 17
PIF_VALUE: 15
PIF_VALUE: 15
PIF_VALUE: 17
PIF_VALUE: 17
PIF_VALUE: 14
PIF_VALUE: 15
PIF_VALUE: 17
PIF_VALUE: 16
PIF_VALUE: 14
PIF_VALUE: 14
PIF_VALUE: 16
PIF_VALUE: 17
PIF_VALUE: 14
PIF_VALUE: 16
PIF_VALUE: 0
PIF_VALUE: 14
PIF_VALUE: 17
PIF_VALUE: 16
PIF_VALUE: 15
PIF_VALUE: 17
PIF_VALUE: 0
PIF_VALUE: 17
PIF_VALUE: 17
PIF_VALUE: 15
PIF_VALUE: 17
PIF_VALUE: 2
PIF_VALUE: 17
PIF_VALUE: 16
PIF_VALUE: 0
PIF_VALUE: 17
PIF_VALUE: 15
PIF_VALUE: 0
PIF_VALUE: 0
PIF_VALUE: 17
PIF_VALUE: 16
PIF_VALUE: 14
PIF_VALUE: 15
PIF_VALUE: 23
PIF_VALUE: 17
PIF_VALUE: 17
PIF_VALUE: 15
PIF_VALUE: 7
PIF_VALUE: 0
PIF_VALUE: 14
PIF_VALUE: 15
PIF_VALUE: 16
PIF_VALUE: 2
PIF_VALUE: 17
PIF_VALUE: 16
PIF_VALUE: 15
PIF_VALUE: 2
PIF_VALUE: 15
PIF_VALUE: 15
PIF_VALUE: 5
PIF_VALUE: 17
PIF_VALUE: 14
PIF_VALUE: 0
PIF_VALUE: 17
PIF_VALUE: 15
PIF_VALUE: 17
PIF_VALUE: 17
PIF_VALUE: 2
PIF_VALUE: 17
PIF_VALUE: 0
PIF_VALUE: 17
PIF_VALUE: 17
PIF_VALUE: 14
PIF_VALUE: 15
PIF_VALUE: 17
PIF_VALUE: 14
PIF_VALUE: 15

## 2020-09-14 NOTE — OP NOTE
then used through the outer working cannula to  make a passageway for the RF wires. The OsteoCool RF wires were then  placed into the center of the vertebral body on either side in excellent  position under x-ray control. The radio frequency ablation was then  performed according to specifications. There was excellent pattern on  the computer screen indicating successful radiofrequency ablation. The  wires were then removed. The balloon was then placed and very gently  the balloon was inflated to about 1 cm with pressures in the 30s. The  balloons were deflated and removed. The bone filling devices were then  used to place some bone cement into the T8 vertebral body. Watching the  x-ray very closely to see the disbursement of the bone cement within the  T8 vertebral body. The bone cement was in excellent position. The bone  cement was allowed to harden and the hardware was removed. Band-Aids  applied. EBL less than 5 mL.         Garrett Islas MD    D: 09/14/2020 13:11:54       T: 09/14/2020 13:16:28     [id number]  Job#: [id number]     Doc#: [id number]    CC:

## 2020-09-14 NOTE — ANESTHESIA PRE PROCEDURE
Department of Anesthesiology  Preprocedure Note       Name:  Lorri Setting   Age:  65 y.o.  :  1950                                          MRN:  [id number]         Date:  2020      Surgeon: Heather Dunlap):  Jesús Deras MD    Procedure: Procedure(s):  T8 OSTEOCOOL VERTEBRAL AUGMENTATION 1 HOUR/ 2 C-ARMS/ MEDTRONIC REP (PT HAD SURGERY 8/10/20) LABS / INPT 20 PT IS AT [facility].  (DR. Prince  23. H&P)  ARRIVAL 9:45AM DO NOT CHANGE TIME    Medications prior to admission:   Prior to Admission medications    Medication Sig Start Date End Date Taking? Authorizing Provider   traMADol (ULTRAM) 50 MG tablet Take 50 mg by mouth every 6 hours as needed for Pain. Historical Provider, MD   oxyCODONE (ROXICODONE) 5 MG immediate release tablet Take 5 mg by mouth every 4 hours as needed for Pain. Historical Provider, MD   sennosides-docusate sodium (SENOKOT-S) 8.6-50 MG tablet Take 1 tablet by mouth daily    Historical Provider, MD   vitamin D (ERGOCALCIFEROL) 1.25 MG (97717 UT) CAPS capsule Take 1 capsule orally once weekly x 4 weeks, then 1 capsule orally once monthly 20   Pauline Santana MD   tiZANidine (ZANAFLEX) 2 MG tablet Take 1 tablet by mouth every 6 hours as needed (muscle spasms) 20   Dakota Greco MD   traZODone (DESYREL) 50 MG tablet Take 0.5 tablets by mouth nightly as needed for Sleep 20   Dakota Greco MD   fentaNYL (DURAGESIC) 12 MCG/HR Place 1 patch onto the skin every 72 hours for 30 days.  20  Aaron Arriaza MD   calcium carbonate (TUMS) 500 MG chewable tablet Take 1 tablet by mouth 3 times daily as needed for Heartburn 8/16/20 9/15/20  Aaron Arriaza MD   melatonin 1 MG tablet Take 5 tablets by mouth nightly as needed for Sleep 20   Aaron Arriaza MD   calcium carbonate (OSCAL) 500 MG TABS tablet Take 500 mg by mouth daily    Historical Provider, MD   atenolol (TENORMIN) 50 MG tablet Take 1 tablet by mouth daily 20   Ana María Ya LADONNA Chavarria CNP   alendronate (FOSAMAX) 70 MG tablet Take 1 tablet by mouth every 7 days 4/28/20   LADONNA Bentley CNP   acetaminophen (TYLENOL) 650 MG CR tablet Take 650 mg by mouth every 8 hours as needed for Pain    Historical Provider, MD       Current medications:    Current Facility-Administered Medications   Medication Dose Route Frequency Provider Last Rate Last Dose    lactated ringers infusion   Intravenous Continuous Steve Cervantes MD        ceFAZolin (ANCEF) 2 g in dextrose 3 % 50 mL IVPB (duplex)  2 g Intravenous On Call to [address] MD           Allergies:     Allergies   Allergen Reactions    Codeine Rash       Problem List:    Patient Active Problem List   Diagnosis Code    Osteopenia M85.80    Anxiety F41.9    Acne rosacea L71.9    Actinic dermatitis L57.8    Compression fracture [facility]8 vertebra ([facility]) S22.060A    Paraparesis ([address] G82.20    Acquired spondylolisthesis of thoracic spine M43.14    Myelopathy of thoracic region M47.14    Malignant neoplasm of thoracic vertebra ([address] C41.2    Essential hypertension I10    Status post laminectomy Z98.890    Postoperative pain after spinal surgery G89.18    Multiple myeloma not having achieved remission ([address] C90.00    Muscle spasm M62.838    Difficulty in walking R26.2    Muscle weakness (generalized) M62.81       Past Medical History:        Diagnosis Date    Acne rosacea     Actinic dermatitis     biopsy proven 1/2018    Acute cystitis without hematuria 8/19/2020    Anxiety     Benign essential hypertension     Dermatitis     History of episode of anxiety 2017    spouse was ill    History of pathological fracture of vertebra 08/2020    T8, laminectomy-decompression-fusion Dr Jeff Pyle, 08/10/2020    Osteopenia     Plasmacytoma ([address] 08/2020    Dr Hiwot Talavera    Urinary incontinence        Past Surgical History:        Procedure Laterality Date    CATARACT REMOVAL      bilateral    LUMBAR FUSION N/A 8/10/2020 T7-8-9  DECOMPRESSION POSTEROLATERAL FUSION PEDICLE SCREWS T6-7-10-11 performed by Carisa Grider MD at [place] [address] History:    Social History     Tobacco Use    Smoking status: Never Smoker    Smokeless tobacco: Never Used   Substance Use Topics    Alcohol use: No     Alcohol/week: 0.0 standard drinks                                Counseling given: Not Answered      Vital Signs (Current):   Vitals:    09/14/20 0950   BP: (!) 146/85   Pulse: 97   Resp: 16   Temp: 97.8 °F (36.6 °C)   TempSrc: Temporal   SpO2: 99%   Weight: 147 lb (66.7 kg)   Height: 5' 2\" (1.575 m)                                              BP Readings from Last 3 Encounters:   09/14/20 (!) 146/85   08/25/20 108/73   08/22/20 128/66       NPO Status: Time of last liquid consumption: 1800                        Time of last solid consumption: 1800                        Date of last liquid consumption: 09/13/20                        Date of last solid food consumption: 09/13/20    BMI:   Wt Readings from Last 3 Encounters:   09/14/20 147 lb (66.7 kg)   09/01/20 141 lb (64 kg)   08/22/20 156 lb (70.8 kg)     Body mass index is 26.89 kg/m². CBC:   Lab Results   Component Value Date    WBC 3.3 09/08/2020    RBC 2.95 09/08/2020    HGB 9.7 09/08/2020    HCT 28.3 09/08/2020    MCV 96.1 09/08/2020    RDW 13.6 09/08/2020     09/08/2020       CMP:   Lab Results   Component Value Date     09/08/2020    K 4.8 09/08/2020    K 3.8 08/16/2020    CL 98 09/08/2020    CO2 27 09/08/2020    BUN 9 09/08/2020    CREATININE 0.57 09/08/2020    GFRAA >60.0 09/08/2020    LABGLOM >60.0 09/08/2020    GLUCOSE 98 09/08/2020    PROT 6.4 08/17/2020    CALCIUM 9.5 09/08/2020    BILITOT <0.2 08/17/2020    ALKPHOS 45 08/17/2020    AST 24 08/17/2020    ALT 12 08/17/2020       POC Tests: No results for input(s): POCGLU, POCNA, POCK, POCCL, POCBUN, POCHEMO, POCHCT in the last 72 hours.     Coags:   Lab Results   Component Value Date    PROTIME 14.6 08/07/2020 INR 1.1 08/07/2020    APTT 22.3 08/07/2020       HCG (If Applicable): No results found for: PREGTESTUR, PREGSERUM, HCG, HCGQUANT     ABGs: No results found for: PHART, PO2ART, RIB4TFI, DCN1LRS, BEART, E9TQQXQL     Type & Screen (If Applicable):  No results found for: LABABO, LABRH    Drug/Infectious Status (If Applicable):  No results found for: HIV, HEPCAB    COVID-19 Screening (If Applicable):   Lab Results   Component Value Date    COVID19 Not Detected 08/16/2020    COVID19 Not Detected 08/13/2020         Anesthesia Evaluation  Patient summary reviewed and Nursing notes reviewed no history of anesthetic complications:   Airway: Mallampati: II  TM distance: >3 FB   Neck ROM: full  Mouth opening: > = 3 FB Dental: normal exam         Pulmonary:Negative Pulmonary ROS and normal exam  breath sounds clear to auscultation                             Cardiovascular:  Exercise tolerance: good (>4 METS),   (+) hypertension:,       ECG reviewed  Rhythm: regular  Rate: normal           Beta Blocker:  Dose within 24 Hrs      ROS comment: Normal sinus rhythm  Right bundle branch block  Abnormal ECG  No previous ECGs available     Neuro/Psych:   Negative Neuro/Psych ROS              GI/Hepatic/Renal: Neg GI/Hepatic/Renal ROS            Endo/Other:    (+) blood dyscrasia: anemia:., .          Pt had PAT visit. Abdominal:           Vascular: negative vascular ROS. Anesthesia Plan      regional and MAC     ASA 3     (Erector Spinae plane block)  Induction: intravenous. MIPS: Prophylactic antiemetics administered. Anesthetic plan and risks discussed with patient. Plan discussed with CRNA.     Attending anesthesiologist reviewed and agrees with Delonte Berry DO   9/14/2020

## 2020-09-14 NOTE — H&P
Patient:  Giovana Ogden  YOB: 1950  Date: 9 14 2020  The patient is a 65 y.o. female who presents today for evaluation of the following problems:       Chief Complaint   Patient presents with    Back Pain           HISTORY OF PRESENT ILLNESS:     She has not tried physical therapy. Date of last of last PT treatment: none     Estimated body mass index is 25.79 kg/m² as calculated from the following:    Height as of 9/1/20: 5' 2\" (1.575 m). Weight as of 9/1/20: 141 lb (64 kg). 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     Dr Kan Tompkins    Urinary incontinence          Past Surgical History         Past Surgical History:   Procedure Laterality Date    CATARACT REMOVAL         bilateral    LUMBAR FUSION N/A 8/10/2020     T7-8-9  DECOMPRESSION POSTEROLATERAL FUSION PEDICLE SCREWS T6-7-10-11 performed by Debbie Cartagena MD at [address] [place] [address] History         Family History   Problem Relation Age of Onset    Diabetes Mother      Hypertension Mother      Hypertension Father      Cancer Sister           multiple myeloma    Breast Cancer Other                 Current Outpatient Medications on File Prior to Visit   Medication Sig Dispense Refill    traMADol (ULTRAM) 50 MG tablet Take 50 mg by mouth every 6 hours as needed for Pain.  oxyCODONE (ROXICODONE) 5 MG immediate release tablet Take 5 mg by mouth every 4 hours as needed for Pain.         sennosides-docusate sodium (SENOKOT-S) 8.6-50 MG tablet Take 1 tablet by mouth daily        vitamin D (ERGOCALCIFEROL) 1.25 MG (91122 UT) CAPS capsule Take 1 capsule orally once weekly x 4 weeks, then 1 capsule orally once monthly 12 capsule 1    tiZANidine (ZANAFLEX) 2 MG tablet Take 1 tablet by mouth every 6 hours as needed (muscle spasms) 40 tablet 0    traZODone (DESYREL) 50 MG tablet Take 0.5 tablets by mouth nightly as needed for Sleep 15 tablet 0    fentaNYL (DURAGESIC) 12 MCG/HR Place 1 patch onto the skin every 72 hours for 30 days. 10 patch 0    calcium carbonate (TUMS) 500 MG chewable tablet Take 1 tablet by mouth 3 times daily as needed for Heartburn 30 tablet 0    melatonin 1 MG tablet Take 5 tablets by mouth nightly as needed for Sleep 30 tablet 0    calcium carbonate (OSCAL) 500 MG TABS tablet Take 500 mg by mouth daily        atenolol (TENORMIN) 50 MG tablet Take 1 tablet by mouth daily 90 tablet 1    alendronate (FOSAMAX) 70 MG tablet Take 1 tablet by mouth every 7 days 4 tablet 12    acetaminophen (TYLENOL) 650 MG CR tablet Take 650 mg by mouth every 8 hours as needed for Pain          No current facility-administered medications on file prior to visit. Social History            Tobacco Use    Smoking status: Never Smoker    Smokeless tobacco: Never Used   Substance Use Topics    Alcohol use: No       Alcohol/week: 0.0 standard drinks    Drug use: No        ALLERGIES       Allergies   Allergen Reactions    Codeine Rash        REVIEW OF SYSTEMS:  10 point review of systems was reviewed with the patient and all are negative except as listed below. Patient seen in consultation at [facility] undergoing  8/10/2020 T8 complete laminectomy transpedicular decompression T6-T7 T9-T10 bilateral pedicle screws posterior lateral fusion. As a stage procedure were planning to proceed with T8 osteochondral and vertebral augmentation for anterior support in the spine.   Returns for staged anterior T8 osteo cooled biopsy and vertebral augmentation for anterior spine support. Physical exam  She remains mildly paraparetic with most weakness in the left lower extremity. she does have good strength and sensation range of motion in the upper extremities. Cranial nerves II through XII grossly intact. Lungs are clear heart tones are normal rubs or murmurs. No adenopathy. Procedure indications risks of osteo-cell with vertebral augmentation kyphoplasty at T8 were explained to her. I gave her a picture of the above x-rays. The procedure, indications and risks of the surgery have been discussed with the patient and family and significant others. The risks are small. There is a low chance of having any type of risk, but the risks still present including even something serious like death, paralysis, sensory loss, loss of bladder or bowel control, pain, bleeding, infection, spinal instability, chance of recurrence and so forth. Surgery is not a guarantee of normalcy. We cannot undo any permanent damage already done. We cannot change the course of any of the other medical diseases. I have discussed alternative procedures, risks and benefits. I have answered their questions. They understand and agree to proceed.          Nneka Burgess MD

## 2020-09-14 NOTE — DISCHARGE INSTR - COC
Continuity of Care Form    Patient Name: Kalani Womack   :  1950  MRN:  [id number]    Admit date:  2020  Discharge date:  ***    Code Status Order: Prior   Advance Directives:   Advance Care Flowsheet Documentation     Date/Time Healthcare Directive Type of Healthcare Directive Copy in [address] Agent's Name Healthcare Agent's Phone Number    20 1000  Yes, patient has an advance directive for healthcare treatment --  No, copy requested from other (See comment) -- -- --          Admitting Physician:  Agueda Houser MD  PCP: Kashif Oneil APRN - CNP    Discharging Nurse: [facility] Unit/Room#: [address]  Discharging Unit Phone Number: ***    Emergency Contact:   Extended Emergency Contact Information  Primary Emergency Contact: [address] Phone: [phone]  Mobile Phone: [phone]  Relation: Child    Past Surgical History:  Past Surgical History:   Procedure Laterality Date    CATARACT REMOVAL      bilateral    LUMBAR FUSION N/A 8/10/2020    T7-8-9  DECOMPRESSION POSTEROLATERAL FUSION PEDICLE SCREWS T6-7-10-11 performed by Agueda Houser MD at [facility]       Immunization History:   Immunization History   Administered Date(s) Administered    Influenza Vaccine, unspecified formulation 2015    Influenza Virus Vaccine 2013, 11/10/2014    Influenza, High Dose (Fluzone 65 yrs and older) 2015, 2018, 10/09/2018    Influenza, Quadv, IM, PF (6 mo and older Fluzone, Flulaval, Fluarix, and 3 yrs and older Afluria) 2016    Influenza, Triv, inactivated, subunit, adjuvanted, IM (Fluad 65 yrs and older) 10/29/2019    Pneumococcal Conjugate 13-valent (Armond Jayson) 2020       Active Problems:  Patient Active Problem List   Diagnosis Code    Osteopenia M85.80    Anxiety F41.9    Acne rosacea L71.9    Actinic dermatitis L57.8    Compression fracture of T8 vertebra ([facility] S22.060A    Paraparesis ([facility]) G82.20    Acquired spondylolisthesis of thoracic spine M43.14    Myelopathy of thoracic region M47.14    Malignant neoplasm of thoracic vertebra ([facility]) C41.2    Essential hypertension I10    Status post laminectomy Z98.890    Postoperative pain after spinal surgery G89.18    Multiple myeloma not having achieved remission ([facility]) C90.00    Muscle spasm M62.838    Difficulty in walking R26.2    Muscle weakness (generalized) M62.81       Isolation/Infection:   Isolation          No Isolation        Patient Infection Status     Infection Onset Added Last Indicated Last Indicated By Review Planned Expiration Resolved Resolved By    None active    Resolved    COVID-19 Rule Out 09/14/20 09/14/20 09/14/20 COVID-19 (Ordered)   09/14/20 Rule-Out Test Resulted    COVID-19 Rule Out 08/13/20 08/13/20 08/16/20 COVID-19 (Ordered)   08/16/20 Rule-Out Test Resulted          Nurse Assessment:  Last Vital Signs: BP (!) 161/72   Pulse 65   Temp 97 °F (36.1 °C) (Temporal)   Resp 19   Ht 5' 2\" (1.575 m)   Wt 147 lb (66.7 kg)   LMP  (LMP Unknown)   SpO2 100%   BMI 26.89 kg/m²     Last documented pain score (0-10 scale):    Last Weight:   Wt Readings from Last 1 Encounters:   09/14/20 147 lb (66.7 kg)     Mental Status:  {IP PT MENTAL STATUS:20030}    IV Access:  { TOMMY IV ACCESS:801918776}    Nursing Mobility/ADLs:  Walking   {CHP DME DULS:707367939}  Transfer  {CHP DME TFAT:294270201}  Bathing  {CHP DME JPWQ:064231380}  Dressing  {CHP DME MEFO:718537788}  Toileting  {CHP DME NOFE:696866787}  Feeding  {CHP DME AKCH:917966270}  Med Admin  {CHP DME BEQE:887740482}  Med Delivery   { TOMMY MED Delivery:686531516}    Wound Care Documentation and Therapy:        Elimination:  Continence:   · Bowel: {YES / SQ:40316}  · Bladder: {YES / RT:48500}  Urinary Catheter: {Urinary Catheter:308901364}   Colostomy/Ileostomy/Ileal Conduit: {YES / XF:64417}       Date of Last BM: ***    Intake/Output Summary (Last 24 hours) Nayeli Levin  is necessary for the continuing treatment of the diagnosis listed and that she requires {Admit to Appropriate Level of Care:66301} for {GREATER/LESS:060337051} 30 days.      Update Admission H&P: {CHP DME Changes in MASVQ:125465125}    PHYSICIAN SIGNATURE:  Electronically signed by Michael Uriarte MD on 9/14/20 at 1:08 PM EDT

## 2020-09-15 ENCOUNTER — HOSPITAL ENCOUNTER (OUTPATIENT)
Dept: RADIATION ONCOLOGY | Age: 70
Discharge: HOME | End: 2020-09-15
Attending: RADIOLOGY
Payer: MEDICARE

## 2020-09-15 ENCOUNTER — OFFICE VISIT (OUTPATIENT)
Dept: GERIATRIC MEDICINE | Age: 70
End: 2020-09-15
Payer: MEDICARE

## 2020-09-15 ENCOUNTER — CLINICAL DOCUMENTATION (OUTPATIENT)
Dept: RADIATION ONCOLOGY | Age: 70
End: 2020-09-15

## 2020-09-15 ENCOUNTER — TELEPHONE (OUTPATIENT)
Dept: FAMILY MEDICINE CLINIC | Age: 70
End: 2020-09-15

## 2020-09-15 PROCEDURE — 1123F ACP DISCUSS/DSCN MKR DOCD: CPT | Performed by: NURSE PRACTITIONER

## 2020-09-15 PROCEDURE — 77412 RADIATION TX DELIVERY LVL 3: CPT | Performed by: RADIOLOGY

## 2020-09-15 PROCEDURE — 99308 SBSQ NF CARE LOW MDM 20: CPT | Performed by: NURSE PRACTITIONER

## 2020-09-15 PROCEDURE — 77336 RADIATION PHYSICS CONSULT: CPT | Performed by: RADIOLOGY

## 2020-09-15 PROCEDURE — 99213 OFFICE O/P EST LOW 20 MIN: CPT | Performed by: RADIOLOGY

## 2020-09-15 NOTE — PROGRESS NOTES
Radiation Oncology Completion Letter    Patient Name:   Dionna Severino  Medical Record #: [id number]  Date of Birth: [birth year]  Diagnosis: Multiple myeloma w T 8 compression fx  Treatment Dates:  8/24-9/15/20    Site: Dose: Total # fractions: Dose per fraction: Energy: Technique   T7-10 30 Gy 10 3 Gy 10/18 MV photons APPA, RPO, LPO   T7-10 10 Gy 5 2 Gy 10/18 MV photons APPA, RPO, LPO   T7-10 40 Gy 15        Treatment course:  Ms. Maxwell Collet tolerated radiation treatment well with the expected toxicity. 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 She underwent kyphoplasty 9/14/20 for stabilization of bone.  Patient was instructed to return to Dr Cristóbal Marr for BMBX (as tolerated) and systemic therapy recommendations

## 2020-09-15 NOTE — DISCHARGE SUMMARY
[address]                      [address], [address] [facility]                               DISCHARGE SUMMARY    PATIENT NAME: Dwight Ko                      :        1950  MED REC NO:   [id number]                            ROOM:  ACCOUNT NO:   [de-identified]                           ADMIT DATE: 2020  PROVIDER:     Cam Bradshaw MD                      [facility] DATE: 2020    On 2020, T8 biopsy, OsteoCool radiofrequency ablation, vertebral  augmentation. The patient tolerated the procedure well. I anticipate discharge later  today. Discharge prescription for tramadol 50 mg #28 sig 1 four times a  day p.r.n. pain up to 7 days. Plan recheck examination in approximately  1 month. DISCHARGE DIAGNOSIS:  T8 multiple myeloma with pathologic fracture,  improved. PLAN:  The patient is remove the Band-Aids later today. Resume all of  her usual activities, diet, medications, bathing and so forth. If she  has any questions or problems, contact the office.         Martine Cox MD    D: 2020 13:13:40       T: 2020 13:18:16     [email or web address]  Job#: [id number]     Doc#: [id number]    CC:

## 2020-09-16 LAB — VITAMIN D 25-HYDROXY: 108.4 NG/ML (ref 30–100)

## 2020-09-18 ENCOUNTER — OFFICE VISIT (OUTPATIENT)
Dept: GERIATRIC MEDICINE | Age: 70
End: 2020-09-18
Payer: MEDICARE

## 2020-09-18 PROCEDURE — 99310 SBSQ NF CARE HIGH MDM 45: CPT | Performed by: NURSE PRACTITIONER

## 2020-09-19 VITALS
OXYGEN SATURATION: 96 % | HEART RATE: 88 BPM | RESPIRATION RATE: 14 BRPM | DIASTOLIC BLOOD PRESSURE: 74 MMHG | SYSTOLIC BLOOD PRESSURE: 120 MMHG | TEMPERATURE: 97.8 F

## 2020-09-21 ENCOUNTER — TELEPHONE (OUTPATIENT)
Dept: FAMILY MEDICINE CLINIC | Age: 70
End: 2020-09-21

## 2020-09-22 ENCOUNTER — OFFICE VISIT (OUTPATIENT)
Dept: GERIATRIC MEDICINE | Age: 70
End: 2020-09-22
Payer: MEDICARE

## 2020-09-22 VITALS
HEART RATE: 70 BPM | TEMPERATURE: 97 F | OXYGEN SATURATION: 95 % | HEIGHT: 62 IN | RESPIRATION RATE: 16 BRPM | BODY MASS INDEX: 26.89 KG/M2

## 2020-09-22 PROBLEM — Z92.3 S/P RADIATION THERAPY: Status: ACTIVE | Noted: 2020-09-22

## 2020-09-22 PROCEDURE — 99316 NF DSCHRG MGMT 30 MIN+: CPT | Performed by: NURSE PRACTITIONER

## 2020-09-22 RX ORDER — TRAMADOL HYDROCHLORIDE 50 MG/1
50 TABLET ORAL EVERY 6 HOURS PRN
Qty: 28 TABLET | Refills: 0 | Status: SHIPPED | OUTPATIENT
Start: 2020-09-22 | End: 2020-10-13 | Stop reason: SDUPTHER

## 2020-09-22 NOTE — PROGRESS NOTES
Name: [facility] Clock: [facility]  [address]  Mini Farmer  Date: 9/18/2020     Subjective:     Chief Complaint   Patient presents with    Follow-up     Evaluation prior to discharge home, medical equipment needs       HPI  James Castellanos is a 65 y.o. female being seen today for evaluation of acute rehab progress, chronic conditions, and medical equipment needs for the home in preparation for discharge next week. She is lying in bed, in no acute distress, well-developed, in good spirits today. Resident was admitted approximately 1 month ago status post pathological T8 compression fracture with total laminectomy. Resident also had decompression and fusion of T6-T10 thoracic vertebra. Surgical biopsy revealed multiple myeloma which she has recently undergone 15 radiation treatments. Resident has had significant postoperative pain and muscle spasms since admission. She is currently on fentanyl patch, oxycodone, Tylenol, and tizanidine. Pain has progressively improved over the last 3 weeks but she continues to have chronic spasms in her abdominal area which she had prior to having the surgery and pain levels that are 7-8 on a scale of 10. Does c/o back pain, but muscle spasms in her abdominal area are more intense. Surgical incision along her thoracic spine is healed, no signs and symptoms of infection. Appetite is fair, resident has had some difficulty swallowing secondary to the radiation treatments. Viscous lidocaine oral solution was administered to help with the pain but resident states \"it still was hard to swallow, it would numb the back of my throat but not down in the esophagus where I needed to have the numbing. \"  Vital signs of been stable, blood pressure controlled, no fever. She has been participating in physical therapy and Occupational Therapy has made significant progress, and has met therapy goals.   She is requesting to be discharged home, she is doing well per physical therapy team, and her daughter will stay with her at the time of discharge for additional support. Therapist states she is [place] assist with light meal prep, ambulates approximately 125 feet with a front wheel walker, 6 steps contact-guard assist, moderate independence in her room. \"  Resident is still complaining of generalized muscle weakness and some difficulty with walking. She states \"I could have help with balance and endurance. These radiation treatments have really wiped me out. \"  Resident will need home health care with PT/OT and skilled nursing at the time of discharge. Equipment needs will include a front wheel walker. Resident is up in her room with a walker, minimal assistance. Resident denies complaints of chest pain chest palpitations shortness of breath irregular heartbeat nausea vomiting diarrhea constipation lightheadedness or dizziness. Okay to discharge home next week. Past Medical History:   Diagnosis Date    Acne rosacea     Actinic dermatitis     biopsy proven 1/2018    Acute cystitis without hematuria 8/19/2020    Anxiety     Benign essential hypertension     Dermatitis     History of episode of anxiety 2017    spouse was ill    History of pathological fracture of vertebra 08/2020    T8, laminectomy-decompression-fusion Dr Santos Acevedo, 08/10/2020    Osteopenia     Plasmac[facility]oma ([facility] 08/2020    Dr Maylin Muñoz Urinary incontinence        Allergies:  Reviewed in nursing facility records  Medications:  Reviewed and reconciled in nursing facility records    Review of Systems Pertinent positives as noted in HPI.         Objective:   /74   Pulse 88   Temp 97.8 °F (36.6 °C)   Resp 14   LMP  (LMP Unknown)   SpO2 96%     Physical Exam Constitutional: Lying in bed, well-developed, in no acute distess  Eyes: PERRLA, conjunctiva/lids normal, sclera clear, EOMI  ENT: Oropharynx normal, no nasal discharge, mucous membranes moist  Neck: Neck supple, no JVD, no thyromegaly, no lymphadenopathy  Pulmonary/Chest:  breathing unlabored, chest excursion symmetrical, no use of accessory muscles, lung sounds clear, no shortness of breath, no dyspnea  Cardiovascular:  S1-S2 regular, no murmur, 2+ DP x 2, no edema  Abd/GI:  abdomen soft, round, non-distended, non-tender, no masses, active bowel sounds x 4 quadrants  MS/Extremities:  OSULLIVAN, ROM limited, abnormal gait, no clubbing, no cyanosis  Psych:  A/O x 3, cooperative with care, no anxiety, appropriate mood and affect  Skin:  warm and dry, color normal, no lesions, no rashes, surgical incision healed     Diagnosis Orders   1. Compression fracture of T8 vertebra, initial encounter ([facility] [address]     2. Status post laminectomy     3. Multiple myeloma, remission status unspecified ([address]     4. S/P radiation therapy     5. Difficulty in walking     6. Muscle weakness (generalized)     7. Muscle spasm     8. Postoperative pain after spinal surgery         Assessment and Plan:      1. Compression fracture of T8 vertebra, initial encounter ([facility] / Status post laminectomy  Surgical incision completely healed. Has follow-up scheduled with Ortho surgery after discharge. 2. Multiple myeloma, remission status unspecified ([address] / S/P radiation therapy  Has completed 15 treatments of radiation, has follow-up with oncology post discharge. 3. Difficulty in walking / Muscle weakness (generalized)  Has been participating in therapy has made progress. Continues to have some weakness and difficulty walking. Will need PT and OT and skilled nursing post discharge. Will need front wheel walker for abnormal gait and lack of balance and poor endurance. 4. Muscle spasm  Continue tizanidine as ordered. Spasms have improved over the past few weeks. 5. Postoperative pain after spinal surgery  Currently on fentanyl patch, tramadol, and oxycodone with alternating doses.   Plan to discontinue fentanyl patch prior to discharge and attempt to reduce oxycodone frequency. Continue all medications as ordered right now. Reviewed labs:  Yes    Time based visit:  time spent 35 minutes>50% spent with counseling and coordination of care. Reviewed with the resident current clinicalstatus, medications, activities and diet. Side effects, adverse effects of the medication prescribed, as well as treatment plan and result expectations Discussed diagnostic results, other suggested diagnostic testing, prognosis, risk and benefits of treatment options, importance of compliance with treatment options, risk factor reduction. I have reviewed the patient's medical history in detail and updated the computerized patient record. This note was partially generated using Dragon voice recognition system, and there may be some incorrect words, spellings, punctuation that were not noticed in checking the note before saving.     Anu Soto, APRN-CNP

## 2020-09-22 NOTE — PROGRESS NOTES
[facility] [id number] [place] [address], [address] [place] [address]    9/3/2020    Yajaira Paige  is a 65 y.o. in the [place] being seen for a f/u of   Chief Complaint   Patient presents with    Back Pain     multiple myeloma, radiation treatments       HPI pt here for rehab and intractable pain after spinal surgery Dr Ree Richardson  She needs her pain meds renewed. Has multiple myeloma with extensive spine involvement and pain  Has 5/10-8/10 at times  Goes to radiation at the hospital and moves fair. Can wt bear and transfer in and out of the wheel chair to bed and gurney as needed  No constipation issues from narcotics. Is on ultram and oxy. She does not feel sedated, no itch, no rash. They are eating and drinking at their baseline per nursing. Have had no recent choking or dysphagia issues. NO agitiation or unusual mental behavioral issues. 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 History   Problem Relation Age of Onset    Diabetes Mother     Hypertension Mother     Hypertension Father     Cancer Sister         multiple myeloma    Breast Cancer Other      Social History     Socioeconomic History    Marital status:   Spouse name: Not on file    Number of children: Not on file    Years of education: Not on file    Highest education level: Not on file   Occupational History    Not on file   Social Needs    Financial resource strain: Not hard at all    Food insecurity     Worry: Never true     Inability: Never true   [employer] needs     Medical: No     Non-medical: No   Tobacco Use    Smoking status: Never Smoker    Smokeless tobacco: Never Used   Substance and Sexual Activity    Alcohol use: No     Alcohol/week: 0.0 standard drinks    Drug use: No    Sexual activity: Not on file   Lifestyle    Physical activity     Days per week: Not on file     Minutes per session: Not on file    Stress: Not on file   Relationships    Social connections     Talks on phone: Not on file     Gets together: Not on file     Attends Jew service: Not on file     Active member of club or organization: Not on file     Attends meetings of clubs or organizations: Not on file     Relationship status: Not on file    Intimate partner violence     Fear of current or ex partner: Not on file     Emotionally abused: Not on file     Physically abused: Not on file     Forced sexual activity: Not on file   Other Topics Concern    Not on file   Social History Narrative    Born in [facility], lives alone in [facility] [place]. Ethnic background [place], [place]     2017. One daughter in [facility], one son in [place], some aged 18+    Worked in retail    Was the caregiver of ill spouse, loved doing babysitting           Allergies: Codeine  NF MEDICATIONS REVIEWED    ROS:   Constitutional: There are no reports of behavioral issues, fever, or increased weakness. No bleeding issues. Respiratory: denies SOB, dyspnea,   Cardiovascular: denies CP, lightheadedness, palpitations, PND, orthopnea,   GI: No N/V/D.    : no reports of dysuria, continent of urine   Extremities: No reports of unaddressed pain issues  Psych: at baseline lucid    Physical exam:   Pulse 70   Temp 97 °F (36.1 °C)   Resp 16   Ht 5' 2\" (1.575 m)   LMP  (LMP Unknown)   SpO2 95%   BMI 26.89 kg/m²   Constitutional: Awake and alert sitting up in bed, general weakness with legs weaker than arms. HEENT: Normocephalic,  EOMs intact, sclera non icteric, buccal mucosa pink and moist  Speech clear    NECK: - no mass visualized  Cardiovascular: Regular rate S1S2 normal, NO S3S4  Respiratory: LCTA, even unlabored respirations, no accessory muscle use noted  GI: abdomen  ND  : continent of urine  Extremities:  PPP  SKELETAL: no redness, or swelling over joints. Limited ROM but spontaneous movement x 4   Psych: pleasant,  good judgement, normal thought content  SKIN: no gross skin lesions noted on visualized skin, warm and dry    ASSESSMENT:     Diagnosis Orders   1. Compression fracture of T8 vertebra, sequela     2. Myelopathy of thoracic region     3. Spine pain         PLAN:  1-3. Renew narcotics, pt under fair control, good control at times  Has terminal multiple myeloma, with radiation treatments for palliative treatment. Continue PT OT rehab     Return if symptoms worsen or fail to improve. Pertinent POC, medications, labs, have been reviewed, continue same. Encourage fluids and good nutrition. Stress fall prevention strategies.     Chaz Mendes APRN-CNP      Chaz Mendes, DNP, MSN, RN, GNP-BC, NP-C  Adult/Artemio Nurse Practitioner  [address] , [facility]  Department of Geriatrics  8:30am-4:30pm   [phone]  After hours Answering service [phone]

## 2020-09-23 ENCOUNTER — CARE COORDINATION (OUTPATIENT)
Dept: CASE MANAGEMENT | Age: 70
End: 2020-09-23

## 2020-09-23 NOTE — CARE COORDINATION
[facility] Transitions Initial Follow Up Call    Call within 2 business days of discharge: Yes    Patient: Yash Yusuf Patient : 1950   MRN: <[id number]>  Reason for Admission: T8 compression fx  Discharge Date: 20 RARS: Readmission Risk Score: 15      Last Discharge [facility]       Complaint Diagnosis Description Type Department Provider    20   Admission (Discharged) Wiliam Anthony MD        Pt went to [address] with Thoracic Compression fx d/t malignant neoplasm. Then to [facility] for 37 days with a d/c on  to home with [address] Hx: Osteoporosis, T8 compression fx with malignant neoplasm.  HTN      Left hippa compliant message with my phone number      Spoke with: [facility]    Facility: [facility]        Care Transitions 24 Hour Call    Care Transitions Interventions         Follow Up  Future Appointments   Date Time Provider Marni Sloan   2020  1:00 PM LADONNA Duncan - JENY [address] [place] [address] Neuro   2020  1:45 PM LADONNA Treviño CNP [facility] [place]   10/15/2020  1:00 PM Yeimi Garcia MD [facility] Neuro       Corrie Watson RN, [phone]

## 2020-09-25 ENCOUNTER — CARE COORDINATION (OUTPATIENT)
Dept: CASE MANAGEMENT | Age: 70
End: 2020-09-25

## 2020-09-25 PROCEDURE — 1111F DSCHRG MED/CURRENT MED MERGE: CPT | Performed by: NURSE PRACTITIONER

## 2020-09-25 NOTE — CARE COORDINATION
[facility] Transitions Initial Follow Up Call    Call within 2 business days of discharge: Yes    Patient: Yelena Villegas Patient : 1950   MRN: <[id number]>  Reason for Admission: T8 compression fx  Discharge Date: 20 RARS: Readmission Risk Score: 15      Last Discharge [address] [address]       Complaint Diagnosis Description Type Department Provider    20   Admission (Discharged) Obdulia Perera MD        Challenges to be reviewed by the provider   Additional needs identified to be addressed with provider No  none    Discussed COVID-19 related testing which was available at this time. Test results were negative. Patient informed of results, if available? Yes         Method of communication with provider : none    Advance Care Planning:   Does patient have an Advance Directive:  not on file. Was this a readmission? No  Patient stated reason for admission:   Patients top risk factors for readmission: none at this time    Care Transition Nurse (CTN) contacted the patient by telephone to perform post hospital discharge assessment. Verified name and  with patient as identifiers. Provided introduction to self, and explanation of the CTN role. CTN reviewed discharge instructions, medical action plan and red flags with patient who verbalized understanding. Patient given an opportunity to ask questions and does not have any further questions or concerns at this time. Were discharge instructions available to patient? Yes. Reviewed appropriate site of care based on symptoms and resources available to patient including: Concerns address PCP or if emergent ED. The patient agrees to contact the PCP office for questions related to their healthcare. Medication reconciliation was performed with patient, who verbalizes understanding of administration of home medications. Covid Risk Education    Patient has following risk factors of: Immunocompromised.    Education provided regarding

## 2020-09-29 VITALS
OXYGEN SATURATION: 96 % | TEMPERATURE: 97.8 F | WEIGHT: 144.6 LBS | DIASTOLIC BLOOD PRESSURE: 61 MMHG | SYSTOLIC BLOOD PRESSURE: 139 MMHG | BODY MASS INDEX: 26.45 KG/M2 | RESPIRATION RATE: 18 BRPM | HEART RATE: 79 BPM

## 2020-09-30 PROBLEM — T45.2X1A VITAMIN D OVERDOSE: Status: ACTIVE | Noted: 2020-09-30

## 2020-09-30 NOTE — PROGRESS NOTES
Name: Giovany Burroughs: [facility]  Mini Farmer  Date: 9/22/2020     Subjective:     Chief Complaint   Patient presents with    Follow-up     Discharge evaluation and medication review       HPI  Wendi Kidd is a 65 y.o. female being seen today for evaluation prior to discharge and medication review. Resident was admitted status post laminectomy of T8, decompression and fusion of T6-T10, and newly diagnosed multiple myeloma thoracic spine. She has undergone therapy with PT and OT secondary to debility and deconditioning and has had 15 radiation treatments for treatment of her multiple myeloma. Resident has had significant postoperative pain and muscle spasms since admission. She is currently on fentanyl patch, oxycodone, Tylenol, and tizanidine. Her pain level has progressively improved over the last several weeks but she continues to have chronic spasms in her abdominal are. Pain level today is 3-4 out of 10 on a scale of 10. Does c/o back pain, but muscle spasms in her abdominal area are more intense than the back pain. Will discontinue oxycodone and fentanyl patch prior to discharge. She has participated in PT and OT over the past several weeks and has met therapy goals. Continues to have weakness since having the kyphoplasty; continue PT OT at home. Resident has had and improving her appetite, weight is stable, vital signs of been stable, no fever. She denies complaints of chest pain chest palpitations irregular heartbeat nausea vomiting diarrhea and constipation. She has been on stool softeners and states \"I do not need those to go home because I have had great bowel movements over the past couple of days. \"  Resident would also complain after her radiation treatments that she would have burning sensation in her throat therefore was unable to eat solid food; preferred more soft foods.   She states the soreness in her throat has improved Postoperative pain after spinal surgery     8. Difficulty in walking     9. Muscle weakness (generalized)         Assessment and Plan:      1. Compression fracture of T8 vertebra, initial encounter ([address] /  Status post laminectomy / Status post kyphoplasty  Laminectomy incision is healed. Most recent kyphoplasty incisions to the left and right of midline laminectomy site have Steri-Strips intact, scant drainage serosanguineous, no signs and symptoms of infection. Resident has been informed to allow the Steri-Strips to fall off instead of trying to pull them off. Has follow-up scheduled with orthopedic surgeon post discharge. 2. Multiple myeloma, remission status unspecified (HCC) / S/P radiation therapy  Completed radiation therapy. Has follow-up with oncologist post discharge. 3. Muscle spasm  Continue tizanidine 2 mg p.o. every 6 hours as needed. Prescription called into the pharmacy    4. Postoperative pain after spinal surgery  Tramadol 50 mg p.o. every 6 hours as needed. Prescription called into the pharmacy. DC oxycodone. Nursing staff just applied a new fentanyl patch today. DC fentanyl patch in 3 days. Will discontinue fentanyl altogether. 5. Difficulty in walking / Muscle weakness (generalized)  Continue PT OT at discharge. Reviewed labs:  Yes     Discharge greater than 31 minutes time spent. Resident has been on multiple narcotics for pain control. She states her current pain level is 3-4 out of 10 on a scale of 10 and agrees to have the oxycodone discontinued and the fentanyl patch discontinued. She would like to continue tramadol until her next follow-up appointment with the orthopedic surgeon. A 14-day supply called into the local pharmacy. He continues to have muscle spasms therefore will renew tizanidine for 2 weeks. I have reviewed the patient's medical history in detail and updated the computerized patient record.     This note was partially generated using Dragon voice recognition system, and there may be some incorrect words, spellings, punctuation that were not noticed in checking the note before saving.     LADONNA Rivera-CNP

## 2020-10-01 NOTE — PROGRESS NOTES
D overdose, accidental or unintentional, initial encounter          Assessment and Plan:      1. Vitamin D overdose, accidental or unintentional, initial encounter  DC vitamin D. Repeat vitamin D level in 6 weeks. Reviewed labs:  Yes    I have reviewed the patient's medical history in detail and updated the computerized patient record. This note was partially generated using Dragon voice recognition system, and there may be some incorrect words, spellings, punctuation that were not noticed in checking the note before saving.     Ramez Weinberg, APRN-CNP

## 2020-10-08 ENCOUNTER — OFFICE VISIT (OUTPATIENT)
Dept: FAMILY MEDICINE CLINIC | Age: 70
End: 2020-10-08
Payer: MEDICARE

## 2020-10-08 VITALS
TEMPERATURE: 97.3 F | WEIGHT: 144 LBS | HEART RATE: 59 BPM | BODY MASS INDEX: 26.5 KG/M2 | DIASTOLIC BLOOD PRESSURE: 64 MMHG | HEIGHT: 62 IN | OXYGEN SATURATION: 98 % | SYSTOLIC BLOOD PRESSURE: 122 MMHG

## 2020-10-08 PROCEDURE — 4040F PNEUMOC VAC/ADMIN/RCVD: CPT | Performed by: NURSE PRACTITIONER

## 2020-10-08 PROCEDURE — 99214 OFFICE O/P EST MOD 30 MIN: CPT | Performed by: NURSE PRACTITIONER

## 2020-10-08 PROCEDURE — G9898 SNP/LG TRM CRE PT W/POS CDE: HCPCS | Performed by: NURSE PRACTITIONER

## 2020-10-08 PROCEDURE — G8399 PT W/DXA RESULTS DOCUMENT: HCPCS | Performed by: NURSE PRACTITIONER

## 2020-10-08 PROCEDURE — 1123F ACP DISCUSS/DSCN MKR DOCD: CPT | Performed by: NURSE PRACTITIONER

## 2020-10-08 PROCEDURE — G8417 CALC BMI ABV UP PARAM F/U: HCPCS | Performed by: NURSE PRACTITIONER

## 2020-10-08 PROCEDURE — G0008 ADMIN INFLUENZA VIRUS VAC: HCPCS | Performed by: NURSE PRACTITIONER

## 2020-10-08 PROCEDURE — G8427 DOCREV CUR MEDS BY ELIG CLIN: HCPCS | Performed by: NURSE PRACTITIONER

## 2020-10-08 PROCEDURE — 1036F TOBACCO NON-USER: CPT | Performed by: NURSE PRACTITIONER

## 2020-10-08 PROCEDURE — 1090F PRES/ABSN URINE INCON ASSESS: CPT | Performed by: NURSE PRACTITIONER

## 2020-10-08 PROCEDURE — 90694 VACC AIIV4 NO PRSRV 0.5ML IM: CPT | Performed by: NURSE PRACTITIONER

## 2020-10-08 PROCEDURE — G8484 FLU IMMUNIZE NO ADMIN: HCPCS | Performed by: NURSE PRACTITIONER

## 2020-10-08 PROCEDURE — G9901 SNP/LG TRM CRE PT W/POS CDE: HCPCS | Performed by: NURSE PRACTITIONER

## 2020-10-08 ASSESSMENT — ENCOUNTER SYMPTOMS
BACK PAIN: 1
TROUBLE SWALLOWING: 0
CONSTIPATION: 0
SHORTNESS OF BREATH: 0
COLOR CHANGE: 0
COUGH: 0
CHEST TIGHTNESS: 0
EYE PAIN: 0
ABDOMINAL PAIN: 0
DIARRHEA: 0

## 2020-10-08 NOTE — PROGRESS NOTES
After obtaining consent, and per orders of Zain HERNANDEZ, injection of HD flu was given in Left deltoid by [facility]. Patient instructed to remain in clinic for 20 minutes afterwards, and to report any adverse reaction to me immediately.  Tolerated well

## 2020-10-08 NOTE — PROGRESS NOTES
Subjective  Chief Complaint   Patient presents with    Follow-Up from [facility] discharge, states that she is feeling pretty good. is having some back tightness today.  Flu Vaccine     wants flu vaccine        HPI    F/u from hospitalization. 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 Has laminectomy with Dr. Taina Sher and has now completed 12 radiation treatments. Doing PT/OT at home. Has children checking on her frequently. Has f/u with Dr. Mica Fragoso on 10/16/20. Had bone marrow biopsy last week. Has f/u with Dr. Taina Sher on 10/15/20. Had some spasms this morning, but otherwise has been recovering very well. No current issues or concerns.     Past Medical History:   Diagnosis Date    Acne rosacea     Actinic dermatitis     biopsy proven 1/2018    Acute cystitis without hematuria 8/19/2020    Anxiety     Benign essential hypertension     Dermatitis     History of episode of anxiety 2017    spouse was ill    History of pathological fracture of vertebra 08/2020    T8, laminectomy-decompression-fusion Dr Taina Sher, 08/10/2020    Osteopenia     Plasmacytoma ([address] 08/2020    Dr Mica Fragoso    Urinary incontinence      Patient Active Problem List    Diagnosis Date Noted    Vitamin D overdose 09/30/2020    S/P radiation therapy 09/22/2020    Difficulty in walking 08/31/2020    Muscle weakness (generalized) 08/31/2020    Status post kyphoplasty 08/19/2020    Postoperative pain after spinal surgery 08/19/2020    Multiple myeloma ([address] 08/19/2020    Muscle spasm 08/19/2020    Malignant neoplasm of thoracic vertebra ([address] 08/13/2020    Myelopathy of thoracic region 08/08/2020    Compression fracture of T8 vertebra ([address] 08/07/2020    Paraparesis ([address] 08/07/2020    Acquired spondylolisthesis of thoracic spine 08/07/2020    Actinic dermatitis     Acne rosacea     Osteopenia     Anxiety     Essential hypertension 11/06/2008     Past Surgical History:   Procedure Laterality Date    CATARACT REMOVAL      bilateral    KYPHOSIS SURGERY N/A 9/14/2020    T8 OSTEOCOOL VERTEBRAL AUGMENTATION performed by Doyle Mendez MD at [address] N/A 8/10/2020    T7-8-9  DECOMPRESSION POSTEROLATERAL FUSION PEDICLE SCREWS T6-7-10-11 performed by Doyle Mendez MD at [address] History   Problem Relation Age of Onset    Diabetes Mother     Hypertension Mother     Hypertension Father     Cancer Sister         multiple myeloma    Breast Cancer Other     Cancer Sister         breast, skin, melanoma     Social History     Socioeconomic History    Marital status:      Spouse name: None    Number of children: None    Years of education: None    Highest education level: None   Occupational History    None   Social Needs    Financial resource strain: Not hard at all   [employer] insecurity     Worry: Never true     Inability: Never true   [employer] needs     Medical: No     Non-medical: No   Tobacco Use    Smoking status: Never Smoker    Smokeless tobacco: Never Used   Substance and Sexual Activity    Alcohol use: No     Alcohol/week: 0.0 standard drinks    Drug use: No    Sexual activity: None   Lifestyle    Physical activity     Days per week: None     Minutes per session: None    Stress: None   Relationships    Social connections     Talks on phone: None     Gets together: None     Attends Bahai service: None     Active member of club or organization: None     Attends meetings of clubs or organizations: None     Relationship status: None    Intimate partner violence     Fear of current or ex partner: None     Emotionally abused: None     Physically abused: None     Forced sexual activity: None   Other Topics Concern    None   Social History Narrative    Born in [facility], lives alone in [place]. Ethnic background [place], [place]     2017.  One daughter in [facility], one son in [place] 10/08/20 1142   BP: 122/64   Site: Left Upper Arm   Position: Sitting   Cuff Size: Medium Adult   Pulse: 59   Temp: 97.3 °F (36.3 °C)   TempSrc: Infrared   SpO2: 98%   Weight: 144 lb (65.3 kg)   Height: 5' 2\" (1.575 m)     Physical Exam  Constitutional:       General: She is not in acute distress. Appearance: Normal appearance. She is normal weight. She is not ill-appearing or toxic-appearing. Comments: Frail appearing   HENT:      Head: Normocephalic and atraumatic. Right Ear: External ear normal.      Left Ear: External ear normal.   Neck:      Musculoskeletal: Normal range of motion and neck supple. No muscular tenderness. Cardiovascular:      Rate and Rhythm: Normal rate and regular rhythm. Pulses: Normal pulses. Heart sounds: Normal heart sounds. No murmur. Pulmonary:      Effort: Pulmonary effort is normal. No respiratory distress. Breath sounds: Normal breath sounds. No stridor. No wheezing, rhonchi or rales. Chest:      Chest wall: No tenderness. Musculoskeletal: Normal range of motion. Right lower leg: No edema. Left lower leg: No edema. Lymphadenopathy:      Cervical: No cervical adenopathy. Skin:     General: Skin is warm and dry. Capillary Refill: Capillary refill takes less than 2 seconds. Coloration: Skin is not jaundiced or pale. Findings: No bruising, erythema, lesion or rash. Comments: Back incision well approx without s/sx of infection   Neurological:      General: No focal deficit present. Mental Status: She is alert and oriented to person, place, and time. Mental status is at baseline. Cranial Nerves: No cranial nerve deficit. Motor: Weakness present. Coordination: Coordination normal.      Comments: Pt in wheelchair currently, but is ambulatory   Psychiatric:         Mood and Affect: Mood normal.         Behavior: Behavior normal.         Thought Content:  Thought content normal.         Judgment: Judgment normal.         Assessment& Plan     Diagnosis Orders   1. Malignant neoplasm of thoracic vertebra (HCC)     2. Compression fracture of T8 vertebra with routine healing, subsequent encounter     3. Solitary plasmacytoma, unspecified whether remission achieved [facility])       Recovering well. Continue with PT/OT. F/u with specialists. F/u in 3 months or sooner PRN. Side effects, adverse effects of the medication prescribed today, as well as treatment plan/ rationale and result expectations have been discussed with the patient who expresses understanding and desires to proceed. Close follow up to evaluate treatment results and for coordination of care. I have reviewed the patient's medical history in detail and updated the computerized patient record. As always, patient is advised that if symptoms worsen in any way they will proceed to the nearest emergency room. Orders Placed This Encounter   Procedures    INFLUENZA, QUADV, ADJUVANTED, 72 YRS =, IM, PF, PREFILL SYR, 0.5ML (FLUAD)       No orders of the defined types were placed in this encounter. Return in about 3 months (around 1/8/2021) for check up.     Yaneth Faye, APRN - CNP

## 2020-10-08 NOTE — PROGRESS NOTES
Vaccine Information Sheet, \"Influenza - Inactivated\"  given to Ethan Parikh, or parent/legal guardian of  Ethan Parikh and verbalized understanding. Patient responses:    Have you ever had a reaction to a flu vaccine? No  Are you able to eat eggs without adverse effects? Yes  Do you have any current illness? No  Have you ever had Guillian [place] Syndrome? No    Flu vaccine given per order. Please see immunization tab.

## 2020-10-13 RX ORDER — TRAMADOL HYDROCHLORIDE 50 MG/1
50 TABLET ORAL EVERY 6 HOURS PRN
Qty: 28 TABLET | Refills: 0 | Status: SHIPPED | OUTPATIENT
Start: 2020-10-13 | End: 2020-10-20

## 2020-10-13 RX ORDER — TIZANIDINE 2 MG/1
2 TABLET ORAL EVERY 6 HOURS PRN
Qty: 40 TABLET | Refills: 0 | Status: SHIPPED | OUTPATIENT
Start: 2020-10-13 | End: 2020-12-08 | Stop reason: ALTCHOICE

## 2020-12-08 ENCOUNTER — OFFICE VISIT (OUTPATIENT)
Dept: FAMILY MEDICINE CLINIC | Age: 70
End: 2020-12-08
Payer: MEDICARE

## 2020-12-08 VITALS
BODY MASS INDEX: 27.23 KG/M2 | OXYGEN SATURATION: 95 % | DIASTOLIC BLOOD PRESSURE: 64 MMHG | WEIGHT: 148 LBS | HEART RATE: 62 BPM | HEIGHT: 62 IN | TEMPERATURE: 97 F | SYSTOLIC BLOOD PRESSURE: 122 MMHG

## 2020-12-08 PROCEDURE — G8484 FLU IMMUNIZE NO ADMIN: HCPCS | Performed by: NURSE PRACTITIONER

## 2020-12-08 PROCEDURE — 1090F PRES/ABSN URINE INCON ASSESS: CPT | Performed by: NURSE PRACTITIONER

## 2020-12-08 PROCEDURE — G9898 SNP/LG TRM CRE PT W/POS CDE: HCPCS | Performed by: NURSE PRACTITIONER

## 2020-12-08 PROCEDURE — G8417 CALC BMI ABV UP PARAM F/U: HCPCS | Performed by: NURSE PRACTITIONER

## 2020-12-08 PROCEDURE — 99213 OFFICE O/P EST LOW 20 MIN: CPT | Performed by: NURSE PRACTITIONER

## 2020-12-08 PROCEDURE — G9901 SNP/LG TRM CRE PT W/POS CDE: HCPCS | Performed by: NURSE PRACTITIONER

## 2020-12-08 PROCEDURE — G8427 DOCREV CUR MEDS BY ELIG CLIN: HCPCS | Performed by: NURSE PRACTITIONER

## 2020-12-08 PROCEDURE — 1123F ACP DISCUSS/DSCN MKR DOCD: CPT | Performed by: NURSE PRACTITIONER

## 2020-12-08 PROCEDURE — G8399 PT W/DXA RESULTS DOCUMENT: HCPCS | Performed by: NURSE PRACTITIONER

## 2020-12-08 PROCEDURE — 4040F PNEUMOC VAC/ADMIN/RCVD: CPT | Performed by: NURSE PRACTITIONER

## 2020-12-08 PROCEDURE — 1036F TOBACCO NON-USER: CPT | Performed by: NURSE PRACTITIONER

## 2020-12-08 RX ORDER — LENALIDOMIDE 25 MG/1
25 CAPSULE ORAL DAILY
COMMUNITY
End: 2021-06-08 | Stop reason: DRUGHIGH

## 2020-12-08 RX ORDER — ATENOLOL 50 MG/1
50 TABLET ORAL DAILY
Qty: 90 TABLET | Refills: 1 | Status: SHIPPED | OUTPATIENT
Start: 2020-12-08 | End: 2021-01-05 | Stop reason: SDUPTHER

## 2020-12-08 ASSESSMENT — ENCOUNTER SYMPTOMS
CHEST TIGHTNESS: 0
COLOR CHANGE: 0
COUGH: 0
PHOTOPHOBIA: 0
SHORTNESS OF BREATH: 0
BACK PAIN: 0

## 2020-12-08 NOTE — PROGRESS NOTES
Subjective  Chief Complaint   Patient presents with    3 Month Follow-Up     HTN       Hypertension   This is a chronic problem. The current episode started more than 1 year ago. The problem is unchanged. The problem is controlled. Pertinent negatives include no chest pain, headaches, malaise/fatigue, palpitations, peripheral edema or shortness of breath. There are no associated agents to hypertension. Risk factors for coronary artery disease include post-menopausal state. Past treatments include beta blockers. She is continuing to follow with Dr. Odilon Severino for plasmacytoma/multiple myeloma. January 4 will be starting medication for bone pain. On revlimid for 1 year. Doing better now that shes off the steroids. Now ambulating with cane or walker.      Past Medical History:   Diagnosis Date    Acne rosacea     Actinic dermatitis     biopsy proven 1/2018    Acute cystitis without hematuria 8/19/2020    Anxiety     Benign essential hypertension     Dermatitis     History of episode of anxiety 2017    spouse was ill    History of pathological fracture of vertebra 08/2020    T8, laminectomy-decompression-fusion Dr Gisselle Hayes, 08/10/2020    Osteopenia     Plasmacytoma ([address] 08/2020    Dr Odilon Severino    Urinary incontinence      Patient Active Problem List    Diagnosis Date Noted    Vitamin D overdose 09/30/2020    S/P radiation therapy 09/22/2020    Difficulty in walking 08/31/2020    Muscle weakness (generalized) 08/31/2020    Status post kyphoplasty 08/19/2020    Postoperative pain after spinal surgery 08/19/2020    Multiple myeloma not having achieved remission ([address] 08/19/2020    Muscle spasm 08/19/2020    Malignant neoplasm of thoracic vertebra ([address] 08/13/2020    Myelopathy of thoracic region 08/08/2020    Compression fracture of T8 vertebra ([address] 08/07/2020    Paraparesis ([address] 08/07/2020    Acquired spondylolisthesis of thoracic spine 08/07/2020    Actinic dermatitis     Acne rosacea     Osteopenia     Anxiety     Essential hypertension 11/06/2008     Past Surgical History:   Procedure Laterality Date    CATARACT REMOVAL      bilateral    KYPHOSIS SURGERY N/A 9/14/2020    T8 OSTEOCOOL VERTEBRAL AUGMENTATION performed by Chalino Reddy MD at [facility] N/A 8/10/2020    T7-8-9  DECOMPRESSION POSTEROLATERAL FUSION PEDICLE SCREWS T6-7-10-11 performed by Chalino Reddy MD at [address] History   Problem Relation Age of Onset    Diabetes Mother     Hypertension Mother     Hypertension Father     Cancer Sister         multiple myeloma    Breast Cancer Other     Cancer Sister         breast, skin, melanoma     Social History     Socioeconomic History    Marital status:      Spouse name: None    Number of children: None    Years of education: None    Highest education level: None   Occupational History    None   Social Needs    Financial resource strain: Not hard at all   [employer] insecurity     Worry: Never true     Inability: Never true   [employer] needs     Medical: No     Non-medical: No   Tobacco Use    Smoking status: Never Smoker    Smokeless tobacco: Never Used   Substance and Sexual Activity    Alcohol use: No     Alcohol/week: 0.0 standard drinks    Drug use: No    Sexual activity: None   Lifestyle    Physical activity     Days per week: None     Minutes per session: None    Stress: None   Relationships    Social connections     Talks on phone: None     Gets together: None     Attends Yarsani service: None     Active member of club or organization: None     Attends meetings of clubs or organizations: None     Relationship status: None    Intimate partner violence     Fear of current or ex partner: None     Emotionally abused: None     Physically abused: None     Forced sexual activity: None   Other Topics Concern    None   Social History Narrative    Born in [facility], lives alone in [place].      Ethnic background [place], lb (67.1 kg)   Height: 5' 2\" (1.575 m)     Physical Exam  Constitutional:       General: She is not in acute distress. Appearance: Normal appearance. She is normal weight. She is not ill-appearing, toxic-appearing or diaphoretic. HENT:      Head: Normocephalic and atraumatic. Right Ear: External ear normal.      Left Ear: External ear normal.   Neck:      Musculoskeletal: Normal range of motion and neck supple. No muscular tenderness. Cardiovascular:      Rate and Rhythm: Normal rate and regular rhythm. Pulses: Normal pulses. Heart sounds: Normal heart sounds. No murmur. Pulmonary:      Effort: Pulmonary effort is normal. No respiratory distress. Breath sounds: Normal breath sounds. No stridor. No wheezing, rhonchi or rales. Chest:      Chest wall: No tenderness. Musculoskeletal: Normal range of motion. Right lower leg: No edema. Left lower leg: No edema. Lymphadenopathy:      Cervical: No cervical adenopathy. Skin:     General: Skin is warm and dry. Capillary Refill: Capillary refill takes less than 2 seconds. Coloration: Skin is not jaundiced or pale. Findings: No bruising, erythema, lesion or rash. Neurological:      General: No focal deficit present. Mental Status: She is alert and oriented to person, place, and time. Mental status is at baseline. Cranial Nerves: No cranial nerve deficit. Motor: Weakness (ambulating with cane for support) present. Coordination: Coordination normal.   Psychiatric:         Mood and Affect: Mood normal.         Behavior: Behavior normal.         Thought Content: Thought content normal.         Judgment: Judgment normal.         Assessment& Plan     Diagnosis Orders   1. Essential hypertension  atenolol (TENORMIN) 50 MG tablet   2.  Solitary plasmacytoma, unspecified whether remission achieved [facility])       Patient advised to occasionally monitor blood pressure at home and call office if blood pressure consistently elevated >140/85. Continue with medications as ordered. Watch excess salt intake as it can contribute to elevations in blood pressure. Patient verbalized understanding. Continue to follow with oncology. F/u in 6 months or sooner PRN. Side effects, adverse effects of the medication prescribed today, as well as treatment plan/ rationale and result expectations have been discussed with the patient who expresses understanding and desires to proceed. Close follow up to evaluate treatment results and for coordination of care. I have reviewed the patient's medical history in detail and updated the computerized patient record. As always, patient is advised that if symptoms worsen in any way they will proceed to the nearest emergency room. No orders of the defined types were placed in this encounter. Orders Placed This Encounter   Medications    atenolol (TENORMIN) 50 MG tablet     Sig: Take 1 tablet by mouth daily     Dispense:  90 tablet     Refill:  1       Return in about 6 months (around 6/8/2021) for check up.     Loren Sher, APRN - CNP

## 2021-01-05 DIAGNOSIS — I10 ESSENTIAL HYPERTENSION: ICD-10-CM

## 2021-01-05 RX ORDER — ATENOLOL 50 MG/1
50 TABLET ORAL DAILY
Qty: 90 TABLET | Refills: 1 | Status: SHIPPED | OUTPATIENT
Start: 2021-01-05 | End: 2021-07-08 | Stop reason: ALTCHOICE

## 2021-03-16 ENCOUNTER — TELEPHONE (OUTPATIENT)
Dept: FAMILY MEDICINE CLINIC | Age: 71
End: 2021-03-16

## 2021-06-08 ENCOUNTER — OFFICE VISIT (OUTPATIENT)
Dept: FAMILY MEDICINE CLINIC | Age: 71
End: 2021-06-08
Payer: MEDICARE

## 2021-06-08 VITALS
WEIGHT: 162 LBS | HEIGHT: 62 IN | OXYGEN SATURATION: 98 % | BODY MASS INDEX: 29.81 KG/M2 | HEART RATE: 57 BPM | DIASTOLIC BLOOD PRESSURE: 80 MMHG | TEMPERATURE: 97.7 F | SYSTOLIC BLOOD PRESSURE: 152 MMHG

## 2021-06-08 DIAGNOSIS — G82.20 PARAPARESIS (HCC): ICD-10-CM

## 2021-06-08 DIAGNOSIS — Z13.220 LIPID SCREENING: ICD-10-CM

## 2021-06-08 DIAGNOSIS — C90.30 SOLITARY PLASMACYTOMA, UNSPECIFIED WHETHER REMISSION ACHIEVED (HCC): ICD-10-CM

## 2021-06-08 DIAGNOSIS — I10 ESSENTIAL HYPERTENSION: Primary | ICD-10-CM

## 2021-06-08 DIAGNOSIS — C90.00 MULTIPLE MYELOMA NOT HAVING ACHIEVED REMISSION (HCC): ICD-10-CM

## 2021-06-08 PROCEDURE — 3017F COLORECTAL CA SCREEN DOC REV: CPT | Performed by: NURSE PRACTITIONER

## 2021-06-08 PROCEDURE — 1090F PRES/ABSN URINE INCON ASSESS: CPT | Performed by: NURSE PRACTITIONER

## 2021-06-08 PROCEDURE — G8399 PT W/DXA RESULTS DOCUMENT: HCPCS | Performed by: NURSE PRACTITIONER

## 2021-06-08 PROCEDURE — G8427 DOCREV CUR MEDS BY ELIG CLIN: HCPCS | Performed by: NURSE PRACTITIONER

## 2021-06-08 PROCEDURE — 1036F TOBACCO NON-USER: CPT | Performed by: NURSE PRACTITIONER

## 2021-06-08 PROCEDURE — 4040F PNEUMOC VAC/ADMIN/RCVD: CPT | Performed by: NURSE PRACTITIONER

## 2021-06-08 PROCEDURE — G8417 CALC BMI ABV UP PARAM F/U: HCPCS | Performed by: NURSE PRACTITIONER

## 2021-06-08 PROCEDURE — 99214 OFFICE O/P EST MOD 30 MIN: CPT | Performed by: NURSE PRACTITIONER

## 2021-06-08 PROCEDURE — 1123F ACP DISCUSS/DSCN MKR DOCD: CPT | Performed by: NURSE PRACTITIONER

## 2021-06-08 RX ORDER — AMLODIPINE BESYLATE 5 MG/1
5 TABLET ORAL DAILY
Qty: 30 TABLET | Refills: 3 | Status: SHIPPED | OUTPATIENT
Start: 2021-06-08 | End: 2021-10-05 | Stop reason: SDUPTHER

## 2021-06-08 RX ORDER — LENALIDOMIDE 15 MG/1
15 CAPSULE ORAL DAILY
COMMUNITY
Start: 2021-05-26

## 2021-06-08 RX ORDER — DEXAMETHASONE 4 MG/1
4 TABLET ORAL WEEKLY
COMMUNITY

## 2021-06-08 SDOH — ECONOMIC STABILITY: FOOD INSECURITY: WITHIN THE PAST 12 MONTHS, YOU WORRIED THAT YOUR FOOD WOULD RUN OUT BEFORE YOU GOT MONEY TO BUY MORE.: NEVER TRUE

## 2021-06-08 SDOH — ECONOMIC STABILITY: FOOD INSECURITY: WITHIN THE PAST 12 MONTHS, THE FOOD YOU BOUGHT JUST DIDN'T LAST AND YOU DIDN'T HAVE MONEY TO GET MORE.: NEVER TRUE

## 2021-06-08 ASSESSMENT — PATIENT HEALTH QUESTIONNAIRE - PHQ9
SUM OF ALL RESPONSES TO PHQ QUESTIONS 1-9: 0
2. FEELING DOWN, DEPRESSED OR HOPELESS: 0
SUM OF ALL RESPONSES TO PHQ QUESTIONS 1-9: 0
SUM OF ALL RESPONSES TO PHQ QUESTIONS 1-9: 0
SUM OF ALL RESPONSES TO PHQ9 QUESTIONS 1 & 2: 0
1. LITTLE INTEREST OR PLEASURE IN DOING THINGS: 0

## 2021-06-08 ASSESSMENT — ENCOUNTER SYMPTOMS
CHEST TIGHTNESS: 0
DIARRHEA: 0
BACK PAIN: 1
EYE PAIN: 0
TROUBLE SWALLOWING: 0
COUGH: 0
CONSTIPATION: 0
SHORTNESS OF BREATH: 0
ABDOMINAL PAIN: 0
COLOR CHANGE: 0

## 2021-06-08 ASSESSMENT — SOCIAL DETERMINANTS OF HEALTH (SDOH): HOW HARD IS IT FOR YOU TO PAY FOR THE VERY BASICS LIKE FOOD, HOUSING, MEDICAL CARE, AND HEATING?: NOT HARD AT ALL

## 2021-06-08 NOTE — PROGRESS NOTES
Subjective  Chief Complaint   Patient presents with    Hypertension     6 month f/u    Back Pain     states that she is still having a lot of back pain    Health Maintenance     wants to do FIT test when we get more in   [address]       Hypertension  This is a chronic problem. The current episode started more than 1 year ago. The problem has been gradually worsening since onset. The problem is uncontrolled. Associated symptoms include malaise/fatigue (fatigue). Pertinent negatives include no chest pain, palpitations, peripheral edema or shortness of breath. Agents associated with hypertension include steroids. Risk factors for coronary artery disease include post-menopausal state. Past treatments include beta blockers. The current treatment provides significant improvement. There are no compliance problems. There is no history of CAD/MI, heart failure or PVD. There is no history of a hypertension causing med or a thyroid problem. 6 month check up. Following with Dr. Kwan Crowder for multiple myeloma/solitary plasmacytoma. Doing well on current treatment, now seeing him every 3 months. Current on labs (cbc and cmp) through oncology. Due for lipids. Issues with chronic back pain, \"tightness\". Feels it is improving over time. Does not wish to take medications or see pain management at this time for it.      Past Medical History:   Diagnosis Date    Acne rosacea     Actinic dermatitis     biopsy proven 1/2018    Acute cystitis without hematuria 8/19/2020    Anxiety     Benign essential hypertension     Dermatitis     History of episode of anxiety 2017    spouse was ill    History of pathological fracture of vertebra 08/2020    T8, laminectomy-decompression-fusion Dr Rush Pickard, 08/10/2020    Osteopenia     Pl[address] [address] 08/2020    Dr Mirna Robledo Urinary incontinence      Patient Active Problem List    Diagnosis Date Noted    Vitamin D overdose 09/30/2020    S/P radiation therapy Was the caregiver of ill spouse, loved doing babysitting         Social Determinants of Health     Financial Resource Strain: Low Risk     Difficulty of Paying Living Expenses: Not hard at all   Food Insecurity: No Food Insecurity    Worried About Running Out of Food in the Last Year: Never true    [address] in the Last Year: Never true   Transportation Needs:     Lack of Transportation (Medical):  Lack of Transportation (Non-Medical):    Physical Activity:     Days of Exercise per Week:     Minutes of Exercise per Session:    Stress:     Feeling of Stress :    Social Connections:     Frequency of Communication with Friends and Family:     Frequency of Social Gatherings with Friends and Family:     Attends [employer] Services:     Active Member of Clubs or Organizations:     Attends Club or Organization Meetings:     Marital Status:    Intimate Partner Violence:     Fear of Current or Ex-Partner:     Emotionally Abused:     Physically Abused:     Sexually Abused:      Current Outpatient Medications on File Prior to Visit   Medication Sig Dispense Refill    REVLIMID 15 MG chemo capsule Take 15 mg by mouth daily       dexamethasone (DECADRON) 4 MG tablet Take 4 mg by mouth once a week 3 tablets once weekly.  atenolol (TENORMIN) 50 MG tablet Take 1 tablet by mouth daily 90 tablet 1    vitamin D (ERGOCALCIFEROL) 1.25 MG (29933 UT) CAPS capsule Take 1 capsule orally once weekly x 4 weeks, then 1 capsule orally once monthly 12 capsule 1    melatonin 1 MG tablet Take 5 tablets by mouth nightly as needed for Sleep 30 tablet 0    calcium carbonate (OSCAL) 500 MG TABS tablet Take 500 mg by mouth daily      alendronate (FOSAMAX) 70 MG tablet Take 1 tablet by mouth every 7 days 4 tablet 12    acetaminophen (TYLENOL) 650 MG CR tablet Take 650 mg by mouth every 8 hours as needed for Pain       No current facility-administered medications on file prior to visit.      Allergies   Allergen Reactions    Codeine Rash       Review of Systems   Constitutional: Positive for fatigue, malaise/fatigue (fatigue) and unexpected weight change. Negative for activity change, appetite change, chills, diaphoresis and fever. HENT: Negative for congestion, ear pain, hearing loss and trouble swallowing. Eyes: Negative for pain and visual disturbance. Respiratory: Negative for cough, chest tightness and shortness of breath. Cardiovascular: Negative for chest pain, palpitations and leg swelling. Gastrointestinal: Negative for abdominal pain, constipation and diarrhea. Musculoskeletal: Positive for back pain. Negative for arthralgias. Skin: Negative for color change and rash. Neurological: Negative for dizziness and light-headedness. Psychiatric/Behavioral: Negative for dysphoric mood. The patient is not nervous/anxious. Objective  Vitals:    06/08/21 0948 06/08/21 0958   BP: (!) 152/78 (!) 152/80   Site: Left Upper Arm    Position: Sitting    Cuff Size: Medium Adult    Pulse: 57    Temp: 97.7 °F (36.5 °C)    TempSrc: Tympanic    SpO2: 98%    Weight: 162 lb (73.5 kg)    Height: 5' 2\" (1.575 m)      Physical Exam  Vitals reviewed. Constitutional:       General: She is not in acute distress. Appearance: Normal appearance. She is well-developed and normal weight. She is not ill-appearing, toxic-appearing or diaphoretic. HENT:      Head: Normocephalic and atraumatic. Right Ear: Hearing and external ear normal.      Left Ear: Hearing and external ear normal.      Nose: Nose normal.   Eyes:      General:         Right eye: No discharge. Left eye: No discharge. Conjunctiva/sclera: Conjunctivae normal.      Pupils: Pupils are equal, round, and reactive to light. Neck:      Thyroid: No thyromegaly. Cardiovascular:      Rate and Rhythm: Normal rate and regular rhythm. Pulses: Normal pulses. Heart sounds: Normal heart sounds. No murmur heard.      Pulmonary: advised that if symptoms worsen in any way they will proceed to the nearest emergency room. Orders Placed This Encounter   Procedures    Lipid Panel     Standing Status:   Future     Standing Expiration Date:   6/8/2022     Order Specific Question:   Is Patient Fasting?/# of Hours     Answer:   y/12       Orders Placed This Encounter   Medications    amLODIPine (NORVASC) 5 MG tablet     Sig: Take 1 tablet by mouth daily     Dispense:  30 tablet     Refill:  3       Return in about 3 weeks (around 6/29/2021) for htn.     Haylee Corrales, APRN - CNP

## 2021-07-06 DIAGNOSIS — Z13.220 LIPID SCREENING: ICD-10-CM

## 2021-07-06 LAB
CHOLESTEROL, TOTAL: 132 MG/DL (ref 0–199)
HDLC SERPL-MCNC: 59 MG/DL (ref 40–59)
LDL CHOLESTEROL CALCULATED: 44 MG/DL (ref 0–129)
TRIGL SERPL-MCNC: 144 MG/DL (ref 0–150)

## 2021-07-08 ENCOUNTER — OFFICE VISIT (OUTPATIENT)
Dept: FAMILY MEDICINE CLINIC | Age: 71
End: 2021-07-08
Payer: MEDICARE

## 2021-07-08 VITALS
HEIGHT: 62 IN | HEART RATE: 71 BPM | TEMPERATURE: 98 F | SYSTOLIC BLOOD PRESSURE: 148 MMHG | BODY MASS INDEX: 29.63 KG/M2 | OXYGEN SATURATION: 99 % | WEIGHT: 161 LBS | DIASTOLIC BLOOD PRESSURE: 80 MMHG

## 2021-07-08 DIAGNOSIS — I10 ESSENTIAL HYPERTENSION: ICD-10-CM

## 2021-07-08 PROCEDURE — G8427 DOCREV CUR MEDS BY ELIG CLIN: HCPCS | Performed by: NURSE PRACTITIONER

## 2021-07-08 PROCEDURE — G8417 CALC BMI ABV UP PARAM F/U: HCPCS | Performed by: NURSE PRACTITIONER

## 2021-07-08 PROCEDURE — 4040F PNEUMOC VAC/ADMIN/RCVD: CPT | Performed by: NURSE PRACTITIONER

## 2021-07-08 PROCEDURE — 3017F COLORECTAL CA SCREEN DOC REV: CPT | Performed by: NURSE PRACTITIONER

## 2021-07-08 PROCEDURE — 1090F PRES/ABSN URINE INCON ASSESS: CPT | Performed by: NURSE PRACTITIONER

## 2021-07-08 PROCEDURE — 99213 OFFICE O/P EST LOW 20 MIN: CPT | Performed by: NURSE PRACTITIONER

## 2021-07-08 PROCEDURE — G8399 PT W/DXA RESULTS DOCUMENT: HCPCS | Performed by: NURSE PRACTITIONER

## 2021-07-08 PROCEDURE — 1123F ACP DISCUSS/DSCN MKR DOCD: CPT | Performed by: NURSE PRACTITIONER

## 2021-07-08 PROCEDURE — 1036F TOBACCO NON-USER: CPT | Performed by: NURSE PRACTITIONER

## 2021-07-08 RX ORDER — ATENOLOL 50 MG/1
50 TABLET ORAL DAILY
Qty: 90 TABLET | Refills: 1 | Status: SHIPPED | OUTPATIENT
Start: 2021-07-08 | End: 2022-02-08

## 2021-07-08 ASSESSMENT — ENCOUNTER SYMPTOMS
DIARRHEA: 0
ABDOMINAL PAIN: 0
COUGH: 0
TROUBLE SWALLOWING: 0
SHORTNESS OF BREATH: 0
CHEST TIGHTNESS: 0
BACK PAIN: 1
EYE PAIN: 0
CONSTIPATION: 0

## 2021-07-08 NOTE — PROGRESS NOTES
Subjective  Chief Complaint   Patient presents with    Hypertension     3 week check up after changing medication    Health Maintenance     does have FIT test       HPI       Here for follow up for htn. Patient was started on amlodipine 5 mg daily at the last visit on 6/8. Has been taking it daily. denies side effects of the medication, no swelling noted. Not checking BP at home. BP still elevated at 148/80 today, not much change since the previous visit. Patient reports she did stop the atenolol when she started the amlodipine, she thought she was supposed to. Denies chest pain, palpitations, dizziness, blurred vision. Feeling well overall. Has the fit test, will be turning it in. Following with oncology for multiple myeloma.      Past Medical History:   Diagnosis Date    Acne rosacea     Actinic dermatitis     biopsy proven 1/2018    Acute cystitis without hematuria 8/19/2020    Anxiety     Benign essential hypertension     Dermatitis     History of episode of anxiety 2017    spouse was ill    History of pathological fracture of vertebra 08/2020    T8, laminectomy-decompression-fusion Dr Calin Gonzales, 08/10/2020    Osteopenia     Plasmacytoma ([address] 08/2020    Dr Ziegler Becky    Urinary incontinence      Patient Active Problem List    Diagnosis Date Noted    Vitamin D overdose 09/30/2020    S/P radiation therapy 09/22/2020    Difficulty in walking 08/31/2020    Muscle weakness (generalized) 08/31/2020    Status post kyphoplasty 08/19/2020    Postoperative pain after spinal surgery 08/19/2020    Multiple myeloma not having achieved remission ([address] 08/19/2020    Muscle spasm 08/19/2020    Malignant neoplasm of thoracic vertebra ([address] 08/13/2020    Myelopathy of thoracic region 08/08/2020    Compression fracture of T8 vertebra ([address] 08/07/2020    Paraparesis ([address] 08/07/2020    Acquired spondylolisthesis of thoracic spine 08/07/2020    Actinic dermatitis     Acne rosacea     Osteopenia  Anxiety     Essential hypertension 11/06/2008     Past Surgical History:   Procedure Laterality Date    CATARACT REMOVAL      bilateral    KYPHOSIS SURGERY N/A 9/14/2020    T8 OSTEOCOOL VERTEBRAL AUGMENTATION performed by Manisha Ibarra MD at [facility] N/A 8/10/2020    T7-8-9  DECOMPRESSION POSTEROLATERAL FUSION PEDICLE SCREWS T6-7-10-11 performed by Manisha Ibarra MD at [address] History   Problem Relation Age of Onset    Diabetes Mother     Hypertension Mother     Hypertension Father     Cancer Sister         multiple myeloma    Breast Cancer Other     Cancer Sister         breast, skin, melanoma     Social History     Socioeconomic History    Marital status:      Spouse name: None    Number of children: None    Years of education: None    Highest education level: None   Occupational History    None   Tobacco Use    Smoking status: Never Smoker    Smokeless tobacco: Never Used   Substance and Sexual Activity    Alcohol use: No     Alcohol/week: 0.0 standard drinks    Drug use: No    Sexual activity: None   Other Topics Concern    None   Social History Narrative    Born in [facility], lives alone in [place]. Ethnic background [place], [place]     2017. 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 spouse, loved doing babysitting         Social Determinants of Health     Financial Resource Strain: Low Risk     Difficulty of Paying Living Expenses: Not hard at all   Food Insecurity: No Food Insecurity    Worried About [address] in the Last Year: Never true    [facility] in the Last Year: Never true   Transportation Needs:     Lack of Transportation (Medical):      Lack of Transportation (Non-Medical):    Physical Activity:     Days of Exercise per Week:     Minutes of Exercise per Session:    Stress:     Feeling of Stress :    Social Connections:     Frequency of Communication with Friends and Family:     Frequency of Social Gatherings with Friends and Family:     Attends [employer] Services:     Active Member of Clubs or Organizations:     Attends Club or Organization Meetings:     Marital Status:    Intimate Partner Violence:     Fear of Current or Ex-Partner:     Emotionally Abused:     Physically Abused:     Sexually Abused:      Current Outpatient Medications on File Prior to Visit   Medication Sig Dispense Refill    REVLIMID 15 MG chemo capsule Take 15 mg by mouth daily       dexamethasone (DECADRON) 4 MG tablet Take 4 mg by mouth once a week 3 tablets once weekly.  amLODIPine (NORVASC) 5 MG tablet Take 1 tablet by mouth daily 30 tablet 3    vitamin D (ERGOCALCIFEROL) 1.25 MG (36818 UT) CAPS capsule Take 1 capsule orally once weekly x 4 weeks, then 1 capsule orally once monthly 12 capsule 1    melatonin 1 MG tablet Take 5 tablets by mouth nightly as needed for Sleep 30 tablet 0    alendronate (FOSAMAX) 70 MG tablet Take 1 tablet by mouth every 7 days 4 tablet 12    acetaminophen (TYLENOL) 650 MG CR tablet Take 650 mg by mouth every 8 hours as needed for Pain      calcium carbonate (OSCAL) 500 MG TABS tablet Take 500 mg by mouth daily (Patient not taking: Reported on 7/8/2021)       No current facility-administered medications on file prior to visit. Allergies   Allergen Reactions    Codeine Rash       Review of Systems   Constitutional: Negative for activity change, appetite change and fatigue. HENT: Negative for ear pain, hearing loss and trouble swallowing. Eyes: Negative for pain and visual disturbance. Respiratory: Negative for cough, chest tightness and shortness of breath. Cardiovascular: Negative for chest pain, palpitations and leg swelling. Gastrointestinal: Negative for abdominal pain, constipation and diarrhea. Genitourinary: Negative for difficulty urinating.    Musculoskeletal: Positive for back pain. Chronic back pain, denies pain today   Neurological: Negative for dizziness and light-headedness. Psychiatric/Behavioral: Negative. Objective  Vitals:    07/08/21 0937 07/08/21 0942   BP: (!) 148/80 (!) 148/80   Site: Left Upper Arm    Position: Sitting    Cuff Size: Medium Adult    Pulse: 71    Temp: 98 °F (36.7 °C)    TempSrc: Tympanic    SpO2: 99%    Weight: 161 lb (73 kg)    Height: 5' 2\" (1.575 m)      Physical Exam  Vitals reviewed. Constitutional:       General: She is not in acute distress. Appearance: She is well-developed. HENT:      Head: Normocephalic and atraumatic. Right Ear: Hearing and external ear normal.      Left Ear: Hearing and external ear normal.   Eyes:      General:         Right eye: No discharge. Left eye: No discharge. Neck:      Thyroid: No thyromegaly. Cardiovascular:      Rate and Rhythm: Normal rate and regular rhythm. Heart sounds: Normal heart sounds. No murmur heard. Pulmonary:      Effort: Pulmonary effort is normal. No respiratory distress. Breath sounds: Normal breath sounds. No wheezing or rales. Musculoskeletal:      Right lower leg: No edema. Left lower leg: No edema. Lymphadenopathy:      Cervical: No cervical adenopathy. Skin:     General: Skin is warm and dry. Coloration: Skin is not pale. Findings: No erythema or rash. Neurological:      Mental Status: She is alert and oriented to person, place, and time. Psychiatric:         Mood and Affect: Mood normal.         Speech: Speech normal.         Behavior: Behavior normal.         Thought Content: Thought content normal.         Judgment: Judgment normal.         Assessment& Plan     Diagnosis Orders   1.  Essential hypertension  atenolol (TENORMIN) 50 MG tablet       Orders Placed This Encounter   Medications    atenolol (TENORMIN) 50 MG tablet     Sig: Take 1 tablet by mouth daily     Dispense:  90 tablet     Refill:  1     Take amlodipine 5 mg daily in addition to the atenolol 50 mg daily for htn. Check BPs daily and keep a log to bring into the next appt. Follow up in 1 month or sooner if needed. Side effects, adverse effects of the medication prescribed today, as well as treatment plan/ rationale and result expectations have been discussed with the patient who expresses understanding and desires to proceed. Close follow up to evaluate treatment results and for coordination of care. I have reviewed the patient's medical history in detail and updated the computerized patient record. As always, patient is advised that if symptoms worsen in any way they will proceed to the nearest emergency room. Return in about 4 weeks (around 8/5/2021) for htn.     Albaro Palmer, APRN - CNP

## 2021-07-08 NOTE — PATIENT INSTRUCTIONS
Check BP at home daily and keep a log to bring in with you at the next visit. Take amlodipine 5 mg daily  in addition to the atenolol 50 mg daily.

## 2021-07-09 DIAGNOSIS — M85.80 OSTEOPENIA, UNSPECIFIED LOCATION: ICD-10-CM

## 2021-07-09 RX ORDER — ALENDRONATE SODIUM 70 MG/1
70 TABLET ORAL
Qty: 4 TABLET | Refills: 12 | Status: SHIPPED | OUTPATIENT
Start: 2021-07-09 | End: 2022-07-08 | Stop reason: SDUPTHER

## 2021-07-09 NOTE — TELEPHONE ENCOUNTER
Future Appointments     Provider Department   8/13/2021 Oracio Kenny, APRN - [facility]   Appt Notes: Return in about 4 weeks (around 8/5/2021) for htn.    Recent Visits    07/08/2021 Essential hypertension   [address], APRCHARLETTE - CNP   06/08/2021 Essential hypertension   [address] Chang, APRN - CNP

## 2021-08-13 ENCOUNTER — OFFICE VISIT (OUTPATIENT)
Dept: FAMILY MEDICINE CLINIC | Age: 71
End: 2021-08-13
Payer: MEDICARE

## 2021-08-13 VITALS
TEMPERATURE: 98.2 F | BODY MASS INDEX: 29.63 KG/M2 | OXYGEN SATURATION: 99 % | HEIGHT: 62 IN | SYSTOLIC BLOOD PRESSURE: 116 MMHG | HEART RATE: 54 BPM | WEIGHT: 161 LBS | DIASTOLIC BLOOD PRESSURE: 60 MMHG

## 2021-08-13 DIAGNOSIS — C90.00 MULTIPLE MYELOMA NOT HAVING ACHIEVED REMISSION (HCC): ICD-10-CM

## 2021-08-13 DIAGNOSIS — I10 ESSENTIAL HYPERTENSION: Primary | ICD-10-CM

## 2021-08-13 PROCEDURE — G8417 CALC BMI ABV UP PARAM F/U: HCPCS | Performed by: NURSE PRACTITIONER

## 2021-08-13 PROCEDURE — G8427 DOCREV CUR MEDS BY ELIG CLIN: HCPCS | Performed by: NURSE PRACTITIONER

## 2021-08-13 PROCEDURE — 99214 OFFICE O/P EST MOD 30 MIN: CPT | Performed by: NURSE PRACTITIONER

## 2021-08-13 PROCEDURE — 1036F TOBACCO NON-USER: CPT | Performed by: NURSE PRACTITIONER

## 2021-08-13 PROCEDURE — 1090F PRES/ABSN URINE INCON ASSESS: CPT | Performed by: NURSE PRACTITIONER

## 2021-08-13 PROCEDURE — 3017F COLORECTAL CA SCREEN DOC REV: CPT | Performed by: NURSE PRACTITIONER

## 2021-08-13 PROCEDURE — 4040F PNEUMOC VAC/ADMIN/RCVD: CPT | Performed by: NURSE PRACTITIONER

## 2021-08-13 PROCEDURE — G8399 PT W/DXA RESULTS DOCUMENT: HCPCS | Performed by: NURSE PRACTITIONER

## 2021-08-13 PROCEDURE — 1123F ACP DISCUSS/DSCN MKR DOCD: CPT | Performed by: NURSE PRACTITIONER

## 2021-08-13 ASSESSMENT — ENCOUNTER SYMPTOMS
SHORTNESS OF BREATH: 0
CHEST TIGHTNESS: 0
ABDOMINAL PAIN: 0
TROUBLE SWALLOWING: 0
COUGH: 0
CONSTIPATION: 0
EYE PAIN: 0
BACK PAIN: 1
DIARRHEA: 0

## 2021-08-13 NOTE — PROGRESS NOTES
Subjective  Chief Complaint   Patient presents with    Hypertension     1 month f/u    Health Maintenance     has FIT test, does not want mammo ordered       Hypertension  This is a chronic problem. The current episode started more than 1 year ago. The problem is unchanged. The problem is controlled. Pertinent negatives include no chest pain, malaise/fatigue, palpitations, peripheral edema or shortness of breath. There are no associated agents to hypertension. Risk factors for coronary artery disease include post-menopausal state. Past treatments include beta blockers and calcium channel blockers. The current treatment provides significant improvement. There are no compliance problems. There is no history of left ventricular hypertrophy or PVD. There is no history of chronic renal disease, a hypertension causing med or a thyroid problem. 1 month follow up on HTN and back pain. BP is now well controlled with atenolol and amlodipine. No side effects. Had appt with oncology this past Wednesday, was told her numbers are continuing to decrease, f/u in 4 months as opposed to 3 months.      Past Medical History:   Diagnosis Date    Acne rosacea     Actinic dermatitis     biopsy proven 1/2018    Acute cystitis without hematuria 8/19/2020    Anxiety     Benign essential hypertension     Dermatitis     History of episode of anxiety 2017    spouse was ill    History of pathological fracture of vertebra 08/2020    T8, laminectomy-decompression-fusion Dr Rodri Curiel, 08/10/2020    Osteopenia     Plasmacytoma ([address] 08/2020    Dr Gerardo Rabago    Urinary incontinence      Patient Active Problem List    Diagnosis Date Noted    Vitamin D overdose 09/30/2020    S/P radiation therapy 09/22/2020    Difficulty in walking 08/31/2020    Muscle weakness (generalized) 08/31/2020    Status post kyphoplasty 08/19/2020    Postoperative pain after spinal surgery 08/19/2020    Multiple myeloma not having achieved remission ([facility] 08/19/2020    Muscle spasm 08/19/2020    Malignant [facility] vertebra [email or web address] 08/13/2020    Myelopathy of thoracic region 08/08/2020    Compression fracture of T8 vertebra (HCC) 08/07/2020    Paraparesis ([email or web address] 08/07/2020    Acquired spondylolisthesis of thoracic spine 08/07/2020    Actinic dermatitis     Acne rosacea     Osteopenia     Anxiety     Essential hypertension 11/06/2008     Past Surgical History:   Procedure Laterality Date    CATARACT REMOVAL      bilateral    KYPHOSIS SURGERY N/A 9/14/2020    T8 OSTEOCOOL VERTEBRAL AUGMENTATION performed by Chalino Reddy MD at [facility] N/A 8/10/2020    T7-8-9  DECOMPRESSION POSTEROLATERAL FUSION PEDICLE SCREWS T6-7-10-11 performed by Chalino Reddy MD at [address] History   Problem Relation Age of Onset    Diabetes Mother     Hypertension Mother     Hypertension Father     Cancer Sister         multiple myeloma    Breast Cancer Other     Cancer Sister         breast, skin, melanoma     Social History     Socioeconomic History    Marital status:      Spouse name: None    Number of children: None    Years of education: None    Highest education level: None   Occupational History    None   Tobacco Use    Smoking status: Never Smoker    Smokeless tobacco: Never Used   Substance and Sexual Activity    Alcohol use: No     Alcohol/week: 0.0 standard drinks    Drug use: No    Sexual activity: None   Other Topics Concern    None   Social History Narrative    Born in [facility], lives alone in [place]. Ethnic background [place], [place]     2017.  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, loved doing babysitting         Social Determinants of Health     Financial Resource Strain: Low Risk     Difficulty of Paying Living Expenses: Not hard at all   Food Insecurity: No Food Insecurity  Worried About Running Out of Food in the Last Year: Never true    Laci of Food in the Last Year: Never true   Transportation Needs:     Lack of Transportation (Medical):  Lack of Transportation (Non-Medical):    Physical Activity:     Days of Exercise per Week:     Minutes of Exercise per Session:    Stress:     Feeling of Stress :    Social Connections:     Frequency of Communication with Friends and Family:     Frequency of Social Gatherings with Friends and Family:     Attends Taoist Services:     Active Member of Clubs or Organizations:     Attends Club or Organization Meetings:     Marital Status:    Intimate Partner Violence:     Fear of Current or Ex-Partner:     Emotionally Abused:     Physically Abused:     Sexually Abused:      Current Outpatient Medications on File Prior to Visit   Medication Sig Dispense Refill    alendronate (FOSAMAX) 70 MG tablet Take 1 tablet by mouth every 7 days 4 tablet 12    atenolol (TENORMIN) 50 MG tablet Take 1 tablet by mouth daily 90 tablet 1    REVLIMID 15 MG chemo capsule Take 15 mg by mouth daily       dexamethasone (DECADRON) 4 MG tablet Take 4 mg by mouth once a week 3 tablets once weekly.  amLODIPine (NORVASC) 5 MG tablet Take 1 tablet by mouth daily 30 tablet 3    vitamin D (ERGOCALCIFEROL) 1.25 MG (71245 UT) CAPS capsule Take 1 capsule orally once weekly x 4 weeks, then 1 capsule orally once monthly 12 capsule 1    melatonin 1 MG tablet Take 5 tablets by mouth nightly as needed for Sleep 30 tablet 0    calcium carbonate (OSCAL) 500 MG TABS tablet Take 500 mg by mouth daily       acetaminophen (TYLENOL) 650 MG CR tablet Take 650 mg by mouth every 8 hours as needed for Pain       No current facility-administered medications on file prior to visit.      Allergies   Allergen Reactions    Codeine Rash       Review of Systems   Constitutional: Negative for activity change, appetite change, chills, diaphoresis, fatigue, fever and less than 2 seconds. Coloration: Skin is not jaundiced or pale. Findings: No bruising, erythema, lesion or rash. Neurological:      General: No focal deficit present. Mental Status: She is alert and oriented to person, place, and time. Mental status is at baseline. Cranial Nerves: No cranial nerve deficit. Coordination: Coordination normal.      Gait: Gait normal.      Comments: Ambulating with cane for assistance   Psychiatric:         Mood and Affect: Mood normal.         Behavior: Behavior normal.         Thought Content: Thought content normal.         Judgment: Judgment normal.         Assessment& Plan     Diagnosis Orders   1. Essential hypertension     2. Multiple myeloma not having achieved remission ([facility]       Patient advised to occasionally monitor blood pressure at home and call office if blood pressure consistently elevated >140/85. Continue with medications as ordered. Watch excess salt intake as it can contribute to elevations in blood pressure. Patient verbalized understanding. Continue to follow with oncology. F/u in 6 months or sooner PRN. Side effects, adverse effects of the medication prescribed today, as well as treatment plan/ rationale and result expectations have been discussed with the patient who expresses understanding and desires to proceed. Close follow up to evaluate treatment results and for coordination of care. I have reviewed the patient's medical history in detail and updated the computerized patient record. As always, patient is advised that if symptoms worsen in any way they will proceed to the nearest emergency room. No orders of the defined types were placed in this encounter. No orders of the defined types were placed in this encounter. Return in about 6 months (around 2/13/2022) for htn.     LADONNA Anaya - CNP

## 2021-10-04 DIAGNOSIS — I10 ESSENTIAL HYPERTENSION: ICD-10-CM

## 2021-10-05 RX ORDER — AMLODIPINE BESYLATE 5 MG/1
5 TABLET ORAL DAILY
Qty: 30 TABLET | Refills: 5 | Status: SHIPPED | OUTPATIENT
Start: 2021-10-05 | End: 2022-02-15 | Stop reason: SDUPTHER

## 2021-12-07 ENCOUNTER — NURSE ONLY (OUTPATIENT)
Dept: FAMILY MEDICINE CLINIC | Age: 71
End: 2021-12-07
Payer: MEDICARE

## 2021-12-07 ENCOUNTER — TELEPHONE (OUTPATIENT)
Dept: FAMILY MEDICINE CLINIC | Age: 71
End: 2021-12-07

## 2021-12-07 DIAGNOSIS — G89.29 CHRONIC MIDLINE THORACIC BACK PAIN: Primary | ICD-10-CM

## 2021-12-07 DIAGNOSIS — Z11.52 ENCOUNTER FOR SCREENING FOR COVID-19: Primary | ICD-10-CM

## 2021-12-07 DIAGNOSIS — M54.6 CHRONIC MIDLINE THORACIC BACK PAIN: Primary | ICD-10-CM

## 2021-12-07 DIAGNOSIS — Z23 INFLUENZA VACCINE NEEDED: Primary | ICD-10-CM

## 2021-12-07 PROCEDURE — 87426 SARSCOV CORONAVIRUS AG IA: CPT | Performed by: NURSE PRACTITIONER

## 2021-12-07 PROCEDURE — 90694 VACC AIIV4 NO PRSRV 0.5ML IM: CPT | Performed by: NURSE PRACTITIONER

## 2021-12-07 PROCEDURE — G0008 ADMIN INFLUENZA VIRUS VAC: HCPCS | Performed by: NURSE PRACTITIONER

## 2021-12-07 RX ORDER — TRAMADOL HYDROCHLORIDE 50 MG/1
50 TABLET ORAL EVERY 8 HOURS PRN
Qty: 9 TABLET | Refills: 0 | Status: SHIPPED | OUTPATIENT
Start: 2021-12-07 | End: 2021-12-10

## 2021-12-07 NOTE — PROGRESS NOTES
Vaccine Information Sheet, \"Influenza - Inactivated\"  given to Elysia , or parent/legal guardian of  Elysia  and verbalized understanding. Patient responses:    Have you ever had a reaction to a flu vaccine? No  Are you able to eat eggs without adverse effects? Yes  Do you have any current illness? No  Have you ever had Guillian Bellwood Syndrome? No    Flu vaccine given per order. Please see immunization tab.

## 2021-12-07 NOTE — PROGRESS NOTES
After obtaining consent, and per orders of Douglas HERNANDEZ, injection of HD flu vaccine was given in the Left deltoid by Malka Juares CMA. Patient instructed to remain in clinic for 20 minutes afterwards, and to report any adverse reaction to me immediately. z track method used, tolerated well.

## 2022-01-19 ENCOUNTER — TELEPHONE (OUTPATIENT)
Dept: FAMILY MEDICINE CLINIC | Age: 72
End: 2022-01-19

## 2022-02-05 DIAGNOSIS — I10 ESSENTIAL HYPERTENSION: ICD-10-CM

## 2022-02-05 NOTE — TELEPHONE ENCOUNTER
1 hour ago (8:50 AM)                 Future Appointments    Encounter Information    Provider Department Appt Notes   2/15/2022 Lauryn Gilman, APRN - [facility] Return in about 6 months (around 2/13/2022) for htn. Recent Visits    08/13/2021 Essential hypertension   [address], APRN - CNP   07/08/2021 Essential hypertension   [address], APRN - CNP   06/08/2021 Essential hypertension   [address], APRN - CNP   12/08/2020 Essential hypertension   [address], APRN - CNP   10/15/2020 Myelopathy of thoracic region   Timmy Good.

## 2022-02-08 RX ORDER — ATENOLOL 50 MG/1
50 TABLET ORAL DAILY
Qty: 90 TABLET | Refills: 1 | Status: SHIPPED | OUTPATIENT
Start: 2022-02-08 | End: 2022-07-08 | Stop reason: SDUPTHER

## 2022-02-15 ENCOUNTER — OFFICE VISIT (OUTPATIENT)
Dept: FAMILY MEDICINE CLINIC | Age: 72
End: 2022-02-15
Payer: MEDICARE

## 2022-02-15 VITALS
SYSTOLIC BLOOD PRESSURE: 130 MMHG | OXYGEN SATURATION: 98 % | WEIGHT: 161 LBS | TEMPERATURE: 98.2 F | DIASTOLIC BLOOD PRESSURE: 70 MMHG | HEIGHT: 62 IN | HEART RATE: 59 BPM | BODY MASS INDEX: 29.63 KG/M2

## 2022-02-15 DIAGNOSIS — G82.20 PARAPARESIS (HCC): ICD-10-CM

## 2022-02-15 DIAGNOSIS — C90.00 MULTIPLE MYELOMA NOT HAVING ACHIEVED REMISSION (HCC): ICD-10-CM

## 2022-02-15 DIAGNOSIS — I10 ESSENTIAL HYPERTENSION: Primary | ICD-10-CM

## 2022-02-15 DIAGNOSIS — Z12.11 COLON CANCER SCREENING: ICD-10-CM

## 2022-02-15 DIAGNOSIS — Z23 NEED FOR PNEUMOCOCCAL VACCINATION: ICD-10-CM

## 2022-02-15 DIAGNOSIS — I10 ESSENTIAL HYPERTENSION: ICD-10-CM

## 2022-02-15 LAB
ALBUMIN SERPL-MCNC: 4.1 G/DL (ref 3.5–4.6)
ALP BLD-CCNC: 69 U/L (ref 40–130)
ALT SERPL-CCNC: 18 U/L (ref 0–33)
ANION GAP SERPL CALCULATED.3IONS-SCNC: 10 MEQ/L (ref 9–15)
AST SERPL-CCNC: 16 U/L (ref 0–35)
BILIRUB SERPL-MCNC: 0.7 MG/DL (ref 0.2–0.7)
BUN BLDV-MCNC: 11 MG/DL (ref 8–23)
CALCIUM SERPL-MCNC: 9.1 MG/DL (ref 8.5–9.9)
CHLORIDE BLD-SCNC: 104 MEQ/L (ref 95–107)
CO2: 26 MEQ/L (ref 20–31)
CREAT SERPL-MCNC: 0.65 MG/DL (ref 0.5–0.9)
GFR AFRICAN AMERICAN: >60
GFR NON-AFRICAN AMERICAN: >60
GLOBULIN: 2.5 G/DL (ref 2.3–3.5)
GLUCOSE BLD-MCNC: 85 MG/DL (ref 70–99)
POTASSIUM SERPL-SCNC: 3.9 MEQ/L (ref 3.4–4.9)
SODIUM BLD-SCNC: 140 MEQ/L (ref 135–144)
TOTAL PROTEIN: 6.6 G/DL (ref 6.3–8)

## 2022-02-15 PROCEDURE — G8484 FLU IMMUNIZE NO ADMIN: HCPCS | Performed by: NURSE PRACTITIONER

## 2022-02-15 PROCEDURE — G8417 CALC BMI ABV UP PARAM F/U: HCPCS | Performed by: NURSE PRACTITIONER

## 2022-02-15 PROCEDURE — 1123F ACP DISCUSS/DSCN MKR DOCD: CPT | Performed by: NURSE PRACTITIONER

## 2022-02-15 PROCEDURE — 3017F COLORECTAL CA SCREEN DOC REV: CPT | Performed by: NURSE PRACTITIONER

## 2022-02-15 PROCEDURE — G0009 ADMIN PNEUMOCOCCAL VACCINE: HCPCS | Performed by: NURSE PRACTITIONER

## 2022-02-15 PROCEDURE — 82274 ASSAY TEST FOR BLOOD FECAL: CPT | Performed by: NURSE PRACTITIONER

## 2022-02-15 PROCEDURE — 90732 PPSV23 VACC 2 YRS+ SUBQ/IM: CPT | Performed by: NURSE PRACTITIONER

## 2022-02-15 PROCEDURE — 4040F PNEUMOC VAC/ADMIN/RCVD: CPT | Performed by: NURSE PRACTITIONER

## 2022-02-15 PROCEDURE — G8399 PT W/DXA RESULTS DOCUMENT: HCPCS | Performed by: NURSE PRACTITIONER

## 2022-02-15 PROCEDURE — G8427 DOCREV CUR MEDS BY ELIG CLIN: HCPCS | Performed by: NURSE PRACTITIONER

## 2022-02-15 PROCEDURE — 1090F PRES/ABSN URINE INCON ASSESS: CPT | Performed by: NURSE PRACTITIONER

## 2022-02-15 PROCEDURE — 99214 OFFICE O/P EST MOD 30 MIN: CPT | Performed by: NURSE PRACTITIONER

## 2022-02-15 PROCEDURE — 1036F TOBACCO NON-USER: CPT | Performed by: NURSE PRACTITIONER

## 2022-02-15 RX ORDER — AMLODIPINE BESYLATE 5 MG/1
5 TABLET ORAL DAILY
Qty: 90 TABLET | Refills: 1 | Status: SHIPPED | OUTPATIENT
Start: 2022-02-15 | End: 2022-04-07 | Stop reason: SDUPTHER

## 2022-02-15 ASSESSMENT — ENCOUNTER SYMPTOMS
TROUBLE SWALLOWING: 0
SHORTNESS OF BREATH: 0
CONSTIPATION: 0
COLOR CHANGE: 0
BACK PAIN: 1
CHEST TIGHTNESS: 0
COUGH: 0
ABDOMINAL PAIN: 0
DIARRHEA: 0
EYE PAIN: 0

## 2022-02-15 ASSESSMENT — PATIENT HEALTH QUESTIONNAIRE - PHQ9
1. LITTLE INTEREST OR PLEASURE IN DOING THINGS: 0
SUM OF ALL RESPONSES TO PHQ QUESTIONS 1-9: 0
SUM OF ALL RESPONSES TO PHQ QUESTIONS 1-9: 0
2. FEELING DOWN, DEPRESSED OR HOPELESS: 0
SUM OF ALL RESPONSES TO PHQ QUESTIONS 1-9: 0
SUM OF ALL RESPONSES TO PHQ9 QUESTIONS 1 & 2: 0
SUM OF ALL RESPONSES TO PHQ QUESTIONS 1-9: 0

## 2022-02-15 NOTE — PROGRESS NOTES
After obtaining consent, and per orders of Pablo HERNANDEZ , injection of pneumovax 23 was given in the Left deltoid by Marc Peterson [address] [place] [address] Peri. Patient instructed to remain in clinic for 20 minutes afterwards, and to report any adverse reaction to me immediately.  Tolerated well,

## 2022-02-15 NOTE — PROGRESS NOTES
Subjective  Chief Complaint   Patient presents with    Hypertension     6 month f/u    Anxiety    Health Maintenance     ordered FIT, does not want mammo ordered yet       Hypertension  This is a chronic problem. The current episode started more than 1 year ago. The problem is unchanged. The problem is controlled. Pertinent negatives include no chest pain, malaise/fatigue, palpitations, peripheral edema or shortness of breath. There are no associated agents to hypertension. Risk factors for coronary artery disease include post-menopausal state. Past treatments include beta blockers and calcium channel blockers. The current treatment provides significant improvement. There are no compliance problems. There is no history of CAD/MI, heart failure or PVD. There is no history of chronic renal disease, a hypertension causing med or a thyroid problem. Back pain-was recently in [address] and had no pain at all while she was there, got back and then had terrible back pain yesterday. Doing okay today. Is looking for a house with family in [place]. She is continuing to follow with Sade Shah-seems him again in April. She is continuing revlimid as prescribed for MM.     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    Dr Ramiro Alamo    Urinary incontinence      Patient Active Problem List    Diagnosis Date Noted    Vitamin D overdose 09/30/2020    S/P radiation therapy 09/22/2020    Difficulty in walking 08/31/2020    Muscle weakness (generalized) 08/31/2020    Status post kyphoplasty 08/19/2020    Postoperative pain after spinal surgery 08/19/2020    Multiple myeloma not having achieved remission ([facility] 08/19/2020    Muscle spasm 08/19/2020    Malignant neoplasm of thoracic vertebra ([facility] 08/13/2020    Myelopathy of thoracic region 08/08/2020    Compression fracture of T8 vertebra (HCC) 08/07/2020    Paraparesis ([facility] 08/07/2020    Acquired spondylolisthesis of thoracic spine 08/07/2020    Actinic dermatitis     Acne rosacea     Osteopenia     Anxiety     Essential hypertension 11/06/2008     Past Surgical History:   Procedure Laterality Date    CATARACT REMOVAL      bilateral    KYPHOSIS SURGERY N/A 9/14/2020    T8 OSTEOCOOL VERTEBRAL AUGMENTATION performed by Kathy Cabrales MD at [facility] N/A 8/10/2020    T7-8-9  DECOMPRESSION POSTEROLATERAL FUSION PEDICLE SCREWS T6-7-10-11 performed by Kathy Cabrales MD at [address] History   Problem Relation Age of Onset    Diabetes Mother     Hypertension Mother     Hypertension Father     Cancer Sister         multiple myeloma    Breast Cancer Other     Cancer Sister         breast, skin, melanoma     Social History     Socioeconomic History    Marital status:      Spouse name: None    Number of children: None    Years of education: None    Highest education level: None   Occupational History    None   Tobacco Use    Smoking status: Never Smoker    Smokeless tobacco: Never Used   Substance and Sexual Activity    Alcohol use: No     Alcohol/week: 0.0 standard drinks    Drug use: No    Sexual activity: None   Other Topics Concern    None   Social History Narrative    Born in [facility], lives alone in [place]. Ethnic background [place], [place]     2017.  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, loved doing babysitting         Social Determinants of Health     Financial Resource Strain: Low Risk     Difficulty of Paying Living Expenses: Not hard at all   Food Insecurity: No Food Insecurity    Worried About [address] [place] [address] in the Last Year: Never true    Ran Out of Food in the Last Year: Never true   Transportation Needs:     Lack of Transportation (Medical): Not on file    Lack of Transportation (Non-Medical): Not on file   Physical Activity:     Days of Exercise per Week: Not on file    Minutes of Exercise per Session: Not on file   Stress:     Feeling of Stress : Not on file   Social Connections:     Frequency of Communication with Friends and Family: Not on file    Frequency of Social Gatherings with Friends and Family: Not on file    Attends Christianity Services: Not on file    Active Member of [address] [place] [address] or Organizations: Not on file    Attends Club or Organization Meetings: Not on file    Marital Status: Not on file   Intimate Partner Violence:     Fear of Current or Ex-Partner: Not on file    Emotionally Abused: Not on file    Physically Abused: Not on file    Sexually Abused: Not on file   Housing Stability:     Unable to Pay for Housing in the Last Year: Not on file    Number of [place] in the Last Year: Not on file    Unstable Housing in the Last Year: Not on file     Current Outpatient Medications on File Prior to Visit   Medication Sig Dispense Refill    atenolol (TENORMIN) 50 MG tablet TAKE 1 TABLET BY MOUTH DAILY 90 tablet 1    alendronate (FOSAMAX) 70 MG tablet Take 1 tablet by mouth every 7 days 4 tablet 12    REVLIMID 15 MG chemo capsule Take 15 mg by mouth daily       dexamethasone (DECADRON) 4 MG tablet Take 4 mg by mouth once a week 3 tablets once weekly.       vitamin D (ERGOCALCIFEROL) 1.25 MG (31624 UT) CAPS capsule Take 1 capsule orally once weekly x 4 weeks, then 1 capsule orally once monthly 12 capsule 1    melatonin 1 MG tablet Take 5 tablets by mouth nightly as needed for Sleep 30 tablet 0    calcium carbonate (OSCAL) 500 MG TABS tablet Take 500 mg by mouth daily  acetaminophen (TYLENOL) 650 MG CR tablet Take 650 mg by mouth every 8 hours as needed for Pain       No current facility-administered medications on file prior to visit. Allergies   Allergen Reactions    Codeine Rash       Review of Systems   Constitutional: Negative for activity change, appetite change, chills, diaphoresis, fatigue, fever and malaise/fatigue. HENT: Negative for congestion, ear pain, hearing loss and trouble swallowing. Eyes: Negative for pain and visual disturbance. Respiratory: Negative for cough, chest tightness and shortness of breath. Cardiovascular: Negative for chest pain, palpitations and leg swelling. Gastrointestinal: Negative for abdominal pain, constipation and diarrhea. Endocrine: Negative for polydipsia, polyphagia and polyuria. Genitourinary: Negative for difficulty urinating and dysuria. Musculoskeletal: Positive for back pain. Negative for arthralgias. Skin: Negative for color change and rash. Neurological: Negative for dizziness and light-headedness. Psychiatric/Behavioral: Negative for dysphoric mood. The patient is not nervous/anxious. Objective  Vitals:    02/15/22 0929   BP: 130/70   Site: Left Upper Arm   Position: Sitting   Cuff Size: Medium Adult   Pulse: 59   Temp: 98.2 °F (36.8 °C)   TempSrc: Infrared   SpO2: 98%   Weight: 161 lb (73 kg)   Height: 5' 2\" (1.575 m)     Physical Exam  Constitutional:       General: She is not in acute distress. Appearance: Normal appearance. She is obese. She is not ill-appearing, toxic-appearing or diaphoretic. HENT:      Head: Normocephalic and atraumatic. Right Ear: External ear normal.      Left Ear: External ear normal.      Nose: Nose normal. No congestion or rhinorrhea. Eyes:      Extraocular Movements: Extraocular movements intact. Conjunctiva/sclera: Conjunctivae normal.      Pupils: Pupils are equal, round, and reactive to light.    Cardiovascular:      Rate and Rhythm: Normal rate and regular rhythm. Pulses: Normal pulses. Heart sounds: Normal heart sounds. No murmur heard. Pulmonary:      Effort: Pulmonary effort is normal. No respiratory distress. Breath sounds: Normal breath sounds. No stridor. No wheezing, rhonchi or rales. Chest:      Chest wall: No tenderness. Musculoskeletal:         General: Normal range of motion. Cervical back: Normal range of motion and neck supple. No tenderness. Right lower leg: No edema. Left lower leg: No edema. Lymphadenopathy:      Cervical: No cervical adenopathy. Skin:     General: Skin is warm. Capillary Refill: Capillary refill takes less than 2 seconds. Coloration: Skin is not jaundiced. Findings: No erythema or lesion. Neurological:      General: No focal deficit present. Mental Status: She is alert and oriented to person, place, and time. Mental status is at baseline. Cranial Nerves: No cranial nerve deficit. Coordination: Coordination normal.      Gait: Gait normal.   Psychiatric:         Mood and Affect: Mood normal.         Behavior: Behavior normal.         Thought Content: Thought content normal.         Judgment: Judgment normal.         Assessment& Plan     Diagnosis Orders   1. Essential hypertension  amLODIPine (NORVASC) 5 MG tablet    Comprehensive Metabolic Panel   2. Colon cancer screening  POCT Fecal Immunochemical Test (FIT)   3. Need for pneumococcal vaccination  PNEUMOVAX 23 subcutaneous/IM (Pneumococcal polysaccharide vaccine 23-valent >= 3yo)   4. Paraparesis ([address]     5. Multiple myeloma not having achieved remission ([address]       Check labs. Continue current regimen. Continue to follow with oncology. F/u in 6 months or sooner PRN. Side effects, adverse effects of the medication prescribed today, as well as treatment plan/ rationale and result expectations have been discussed with the patient who expresses understanding and desires to proceed.     Close

## 2022-04-06 DIAGNOSIS — I10 ESSENTIAL HYPERTENSION: ICD-10-CM

## 2022-04-07 RX ORDER — AMLODIPINE BESYLATE 5 MG/1
5 TABLET ORAL DAILY
Qty: 90 TABLET | Refills: 1 | Status: SHIPPED | OUTPATIENT
Start: 2022-04-07 | End: 2022-07-08 | Stop reason: SDUPTHER

## 2022-07-08 ENCOUNTER — OFFICE VISIT (OUTPATIENT)
Dept: FAMILY MEDICINE CLINIC | Age: 72
End: 2022-07-08
Payer: MEDICARE

## 2022-07-08 VITALS
OXYGEN SATURATION: 99 % | BODY MASS INDEX: 29.7 KG/M2 | SYSTOLIC BLOOD PRESSURE: 128 MMHG | WEIGHT: 162.4 LBS | DIASTOLIC BLOOD PRESSURE: 72 MMHG | HEART RATE: 58 BPM | TEMPERATURE: 97.9 F

## 2022-07-08 DIAGNOSIS — M85.80 OSTEOPENIA, UNSPECIFIED LOCATION: ICD-10-CM

## 2022-07-08 DIAGNOSIS — I10 ESSENTIAL HYPERTENSION: ICD-10-CM

## 2022-07-08 PROCEDURE — G8399 PT W/DXA RESULTS DOCUMENT: HCPCS | Performed by: NURSE PRACTITIONER

## 2022-07-08 PROCEDURE — G8417 CALC BMI ABV UP PARAM F/U: HCPCS | Performed by: NURSE PRACTITIONER

## 2022-07-08 PROCEDURE — 1123F ACP DISCUSS/DSCN MKR DOCD: CPT | Performed by: NURSE PRACTITIONER

## 2022-07-08 PROCEDURE — G8427 DOCREV CUR MEDS BY ELIG CLIN: HCPCS | Performed by: NURSE PRACTITIONER

## 2022-07-08 PROCEDURE — 1036F TOBACCO NON-USER: CPT | Performed by: NURSE PRACTITIONER

## 2022-07-08 PROCEDURE — 3017F COLORECTAL CA SCREEN DOC REV: CPT | Performed by: NURSE PRACTITIONER

## 2022-07-08 PROCEDURE — 1090F PRES/ABSN URINE INCON ASSESS: CPT | Performed by: NURSE PRACTITIONER

## 2022-07-08 PROCEDURE — 3288F FALL RISK ASSESSMENT DOCD: CPT | Performed by: NURSE PRACTITIONER

## 2022-07-08 PROCEDURE — 99213 OFFICE O/P EST LOW 20 MIN: CPT | Performed by: NURSE PRACTITIONER

## 2022-07-08 RX ORDER — ATENOLOL 50 MG/1
50 TABLET ORAL DAILY
Qty: 90 TABLET | Refills: 1 | Status: SHIPPED | OUTPATIENT
Start: 2022-07-08

## 2022-07-08 RX ORDER — AMLODIPINE BESYLATE 5 MG/1
5 TABLET ORAL DAILY
Qty: 90 TABLET | Refills: 1 | Status: SHIPPED | OUTPATIENT
Start: 2022-07-08

## 2022-07-08 RX ORDER — ALENDRONATE SODIUM 70 MG/1
70 TABLET ORAL
Qty: 12 TABLET | Refills: 3 | Status: SHIPPED | OUTPATIENT
Start: 2022-07-08

## 2022-07-08 SDOH — ECONOMIC STABILITY: FOOD INSECURITY: WITHIN THE PAST 12 MONTHS, THE FOOD YOU BOUGHT JUST DIDN'T LAST AND YOU DIDN'T HAVE MONEY TO GET MORE.: NEVER TRUE

## 2022-07-08 SDOH — ECONOMIC STABILITY: FOOD INSECURITY: WITHIN THE PAST 12 MONTHS, YOU WORRIED THAT YOUR FOOD WOULD RUN OUT BEFORE YOU GOT MONEY TO BUY MORE.: NEVER TRUE

## 2022-07-08 ASSESSMENT — ENCOUNTER SYMPTOMS
SHORTNESS OF BREATH: 0
ABDOMINAL PAIN: 0
BACK PAIN: 1
TROUBLE SWALLOWING: 0
CONSTIPATION: 0
CHEST TIGHTNESS: 0
DIARRHEA: 0
EYE PAIN: 0
COUGH: 0
COLOR CHANGE: 0

## 2022-07-08 ASSESSMENT — SOCIAL DETERMINANTS OF HEALTH (SDOH): HOW HARD IS IT FOR YOU TO PAY FOR THE VERY BASICS LIKE FOOD, HOUSING, MEDICAL CARE, AND HEATING?: NOT HARD AT ALL

## 2022-07-08 NOTE — PROGRESS NOTES
Subjective  Chief Complaint   Patient presents with    Hypertension     follow up and general check. Hypertension  This is a chronic problem. The current episode started more than 1 year ago. The problem is unchanged. The problem is controlled. Pertinent negatives include no chest pain, malaise/fatigue, palpitations, peripheral edema or shortness of breath. There are no associated agents to hypertension. Risk factors for coronary artery disease include post-menopausal state. Past treatments include beta blockers and calcium channel blockers. The current treatment provides significant improvement. There are no compliance problems. There is no history of CAD/MI, heart failure or PVD. There is no history of chronic renal disease, a hypertension causing med or a thyroid problem. Bought a house in [place] with family. Moving next week. She is continuing to follow with Dr. Jessica Notice saw him last week. She is continuing revlimid as prescribed for MM. She will be establishing with oncologist in [address] [place] [address].      Past Medical History:   Diagnosis Date    Acne rosacea     Actinic dermatitis     biopsy proven 1/2018    Acute cystitis without hematuria 8/19/2020    Anxiety     Benign essential hypertension     Dermatitis     History of episode of anxiety 2017    spouse was ill    History of pathological fracture of vertebra 08/2020    T8, laminectomy-decompression-fusion Dr Gayle Richards, 08/10/2020    Osteopenia     Plasmacytoma ([facility] [address] 08/2020    Dr Arlene Pope    Urinary incontinence      Patient Active Problem List    Diagnosis Date Noted    Vitamin D overdose 09/30/2020    S/P radiation therapy 09/22/2020    Difficulty in walking 08/31/2020    Muscle weakness (generalized) 08/31/2020    Status post kyphoplasty 08/19/2020    Postoperative pain after spinal surgery 08/19/2020    Multiple myeloma not having achieved remission ([address] 08/19/2020    Muscle spasm 08/19/2020    Malignant neoplasm of thoracic vertebra ([facility] [address] 08/13/2020    Myelopathy of thoracic region 08/08/2020    Compression fracture of T8 vertebra (HCC) 08/07/2020    Paraparesis ([facility] [address] 08/07/2020    Acquired spondylolisthesis of thoracic spine 08/07/2020    Actinic dermatitis     Acne rosacea     Osteopenia     Anxiety     Essential hypertension 11/06/2008     Past Surgical History:   Procedure Laterality Date    CATARACT REMOVAL      bilateral    KYPHOSIS SURGERY N/A 9/14/2020    T8 OSTEOCOOL VERTEBRAL AUGMENTATION performed by Andria Marc MD at [address] N/A 8/10/2020    T7-8-9  DECOMPRESSION POSTEROLATERAL FUSION PEDICLE SCREWS T6-7-10-11 performed by Andria Marc MD at [address] History   Problem Relation Age of Onset    Diabetes Mother     Hypertension Mother     Hypertension Father     Cancer Sister         multiple myeloma    Breast Cancer Other     Cancer Sister         breast, skin, melanoma     Social History     Socioeconomic History    Marital status:      Spouse name: None    Number of children: None    Years of education: None    Highest education level: None   Occupational History    None   Tobacco Use    Smoking status: Never Smoker    Smokeless tobacco: Never Used   Substance and Sexual Activity    Alcohol use: No     Alcohol/week: 0.0 standard drinks    Drug use: No    Sexual activity: None   Other Topics Concern    None   Social History Narrative    Born in [facility], lives alone in [place]. Ethnic background [place], [place]     2017.  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, loved doing babysitting         Social Determinants of Health     Financial Resource Strain: Low Risk     Difficulty of Paying Living Expenses: Not hard at all   Food Insecurity: No Food Insecurity    Worried About [facility] in the Last Year: Never true   [facility] Ran Out of Food in the Last Year: Never true   Transportation Needs:     Lack of Transportation (Medical): Not on file    Lack of Transportation (Non-Medical): Not on file   Physical Activity:     Days of Exercise per Week: Not on file    Minutes of Exercise per Session: Not on file   Stress:     Feeling of Stress : Not on file   Social Connections:     Frequency of Communication with Friends and Family: Not on file    Frequency of Social Gatherings with Friends and Family: Not on file    Attends Taoist Services: Not on file    Active Member of [address] [place] [address] [employer] or Organizations: Not on file    Attends Club or Organization Meetings: Not on file    Marital Status: Not on file   Intimate Partner Violence:     Fear of Current or Ex-Partner: Not on file    Emotionally Abused: Not on file    Physically Abused: Not on file    Sexually Abused: Not on file   Housing Stability:     Unable to Pay for Housing in the Last Year: Not on file    Number of [place] in the Last Year: Not on file    Unstable Housing in the Last Year: Not on file     Current Outpatient Medications on File Prior to Visit   Medication Sig Dispense Refill    REVLIMID 15 MG chemo capsule Take 15 mg by mouth daily       dexamethasone (DECADRON) 4 MG tablet Take 4 mg by mouth once a week 3 tablets once weekly.  vitamin D (ERGOCALCIFEROL) 1.25 MG (63626 UT) CAPS capsule Take 1 capsule orally once weekly x 4 weeks, then 1 capsule orally once monthly 12 capsule 1    melatonin 1 MG tablet Take 5 tablets by mouth nightly as needed for Sleep 30 tablet 0    calcium carbonate (OSCAL) 500 MG TABS tablet Take 500 mg by mouth daily       acetaminophen (TYLENOL) 650 MG CR tablet Take 650 mg by mouth every 8 hours as needed for Pain       No current facility-administered medications on file prior to visit.      Allergies   Allergen Reactions    Codeine Rash       Review of Systems   Constitutional: Negative for activity change, appetite change, chills, diaphoresis, fatigue, fever and malaise/fatigue. HENT: Negative for congestion, ear pain, hearing loss and trouble swallowing. Eyes: Negative for pain and visual disturbance. Respiratory: Negative for cough, chest tightness and shortness of breath. Cardiovascular: Negative for chest pain, palpitations and leg swelling. Gastrointestinal: Negative for abdominal pain, constipation and diarrhea. Endocrine: Negative for polydipsia, polyphagia and polyuria. Genitourinary: Negative for difficulty urinating and dysuria. Musculoskeletal: Positive for back pain. Negative for arthralgias. Skin: Negative for color change and rash. Neurological: Negative for dizziness and light-headedness. Psychiatric/Behavioral: Negative for dysphoric mood. The patient is not nervous/anxious. Objective  Vitals:    07/08/22 1503   BP: 128/72   Site: Left Upper Arm   Position: Sitting   Cuff Size: Medium Adult   Pulse: 58   Temp: 97.9 °F (36.6 °C)   TempSrc: Infrared   SpO2: 99%   Weight: 162 lb 6.4 oz (73.7 kg)     Physical Exam  Constitutional:       General: She is not in acute distress. Appearance: Normal appearance. She is normal weight. She is not ill-appearing, toxic-appearing or diaphoretic. HENT:      Head: Normocephalic and atraumatic. Right Ear: External ear normal.      Left Ear: External ear normal.      Nose: Nose normal. No congestion or rhinorrhea. Eyes:      Extraocular Movements: Extraocular movements intact. Conjunctiva/sclera: Conjunctivae normal.      Pupils: Pupils are equal, round, and reactive to light. Cardiovascular:      Rate and Rhythm: Normal rate and regular rhythm. Pulses: Normal pulses. Heart sounds: Normal heart sounds. No murmur heard. Pulmonary:      Effort: Pulmonary effort is normal. No respiratory distress. Breath sounds: Normal breath sounds. No stridor. No wheezing, rhonchi or rales. Chest:      Chest wall: No tenderness. Musculoskeletal:         General: Normal range of motion. Cervical back: Normal range of motion and neck supple. No tenderness. Right lower leg: No edema. Left lower leg: No edema. Lymphadenopathy:      Cervical: No cervical adenopathy. Skin:     General: Skin is warm. Capillary Refill: Capillary refill takes less than 2 seconds. Coloration: Skin is not jaundiced. Findings: No erythema or lesion. Neurological:      General: No focal deficit present. Mental Status: She is alert and oriented to person, place, and time. Mental status is at baseline. Cranial Nerves: No cranial nerve deficit. Coordination: Coordination normal.      Gait: Gait normal.   Psychiatric:         Mood and Affect: Mood normal.         Behavior: Behavior normal.         Thought Content: Thought content normal.         Judgment: Judgment normal.         Assessment& Plan     Diagnosis Orders   1. Osteopenia, unspecified location  alendronate (FOSAMAX) 70 MG tablet   2. Essential hypertension  amLODIPine (NORVASC) 5 MG tablet    atenolol (TENORMIN) 50 MG tablet     Continue current regimen. Establish with PCP in [address] [place] [address]. Side effects, adverse effects of the medication prescribed today, as well as treatment plan/ rationale and result expectations have been discussed with the patient who expresses understanding and desires to proceed. Close follow up to evaluate treatment results and for coordination of care. I have reviewed the patient's medical history in detail and updated the computerized patient record. As always, patient is advised that if symptoms worsen in any way they will proceed to the nearest emergency room. No orders of the defined types were placed in this encounter.       Orders Placed This Encounter   Medications    alendronate (FOSAMAX) 70 MG tablet     Sig: Take 1 tablet by mouth every 7 days     Dispense:  12 tablet     Refill:  3    amLODIPine (NORVASC) 5 MG tablet     Sig: Take 1 tablet by mouth daily     Dispense:  90 tablet     Refill:  1    atenolol (TENORMIN) 50 MG tablet     Sig: Take 1 tablet by mouth daily     Dispense:  90 tablet     Refill:  1       Medications Discontinued During This Encounter   Medication Reason    alendronate (FOSAMAX) 70 MG tablet REORDER    atenolol (TENORMIN) 50 MG tablet REORDER    amLODIPine (NORVASC) 5 MG tablet REORDER       Return if symptoms worsen or fail to improve.     Hawk Ku, APRN - CNP

## 2022-09-29 NOTE — ANESTHESIA POSTPROCEDURE EVALUATION
Department of Anesthesiology  Postprocedure Note    Patient: Yash Yusuf  MRN: [id number]  YOB: 1950  Date of evaluation: 9/14/2020  Time:  1:05 PM     Procedure Summary     Date:  09/14/20 Room / Location:  [facility] OR 01 / [facility]    Anesthesia Start:  1146 Anesthesia Stop:  2735    Procedure:  T8 OSTEOCOOL VERTEBRAL AUGMENTATION (N/A ) Diagnosis:  (T8 CANCER PATHOLOGIC FRACTURE)    Surgeon:  Yeimi Garcia MD Responsible Provider:  LADONNA Ignacio CRNA    Anesthesia Type:  regional, MAC ASA Status:  3          Anesthesia Type: regional, MAC    Bryson Phase I: Bryson Score: 10    Bryson Phase II:      Last vitals: Reviewed and per EMR flowsheets.        Anesthesia Post Evaluation    Patient location during evaluation: bedside  Patient participation: complete - patient participated  Level of consciousness: awake  Airway patency: patent  Nausea & Vomiting: no nausea and vomiting  Complications: no  Cardiovascular status: blood pressure returned to baseline  Respiratory status: acceptable  Hydration status: euvolemic Verified name and date of birth with Fifi Heredia from home health   She stated she will let the family know they can  medication.

## 2022-10-26 ENCOUNTER — TELEPHONE (OUTPATIENT)
Dept: FAMILY MEDICINE CLINIC | Age: 72
End: 2022-10-26

## 2022-11-28 NOTE — PROGRESS NOTES
Bonilla discontinued and purewick in place pt complaining of pain repositioned and was encouraged to use IS Euthymic

## 2023-05-16 ENCOUNTER — TELEPHONE (OUTPATIENT)
Dept: GASTROENTEROLOGY | Age: 73
End: 2023-05-16

## 2023-05-30 ENCOUNTER — TELEPHONE (OUTPATIENT)
Dept: FAMILY MEDICINE CLINIC | Age: 73
End: 2023-05-30

## (undated) DEVICE — SYRINGE A08A KYPHX XPANDER INFLATION: Brand: KYPHON®  INFLATION SYRINGE

## (undated) DEVICE — 3M™ STERI-STRIP™ REINFORCED ADHESIVE SKIN CLOSURES, R1547, 1/2 IN X 4 IN (12 MM X 100 MM), 6 STRIPS/ENVELOPE: Brand: 3M™ STERI-STRIP™

## (undated) DEVICE — SHEET,DRAPE,53X77,STERILE: Brand: MEDLINE

## (undated) DEVICE — CODMAN® SURGICAL PATTIES 1/2" X 3" (1.27CM X 7.62CM): Brand: CODMAN®

## (undated) DEVICE — COUNTER NDL 40 COUNT HLD 70 FOAM BLK ADH W/ MAG

## (undated) DEVICE — SYRINGE MED 30ML STD CLR PLAS LUERLOCK TIP N CTRL DISP

## (undated) DEVICE — 3M™ IOBAN™ 2 ANTIMICROBIAL INCISE DRAPE 6650EZ: Brand: IOBAN™ 2

## (undated) DEVICE — INTENDED FOR TISSUE SEPARATION, AND OTHER PROCEDURES THAT REQUIRE A SHARP SURGICAL BLADE TO PUNCTURE OR CUT.: Brand: BARD-PARKER ® CARBON RIB-BACK BLADES

## (undated) DEVICE — GLOVE ORANGE PI 7 1/2   MSG9075

## (undated) DEVICE — APPLICATOR MEDICATED 26 CC SOLUTION HI LT ORNG CHLORAPREP

## (undated) DEVICE — CATHETER IV 16 GAX2 IN STR INTROCAN SAFETY

## (undated) DEVICE — CONTAINER,SPECIMEN,OR STERILE,4OZ: Brand: MEDLINE

## (undated) DEVICE — PACK,SET UP,DRAPE: Brand: MEDLINE

## (undated) DEVICE — STERILE DISPOSABLE EQUIPMENT COVER - BAND BAG 30" X 18": Brand: PREFERRED MEDICAL PRODUCTS, LLC

## (undated) DEVICE — TAMP K09A XPAN INFLAT BONE  SIZE 15/3-RB: Brand: KYPHON XPANDER™ INFLATABLE BONE TAMP

## (undated) DEVICE — SUTURE VIC + BR UD CP-2 0-0 27IN VCP870H

## (undated) DEVICE — GLOVE ORANGE PI 8   MSG9080

## (undated) DEVICE — DRAPE SURG W41XL74IN CLR FULL SZ C ARM 3 ADH POLY STRP E

## (undated) DEVICE — TOWEL,OR,DSP,ST,BLUE,STD,4/PK,20PK/CS: Brand: MEDLINE

## (undated) DEVICE — 3.0MM PRECISION NEURO (MATCH HEAD)

## (undated) DEVICE — NEEDLE SPINAL 22GA L3.5IN SPINOCAN

## (undated) DEVICE — INTRODUCER T15D OIS BLUNT: Brand: KYPHON® OSTEO INTRODUCER™ SYSTEM

## (undated) DEVICE — CORD,CAUTERY,BIPOLAR,STERILE: Brand: MEDLINE

## (undated) DEVICE — PENCIL SMK EVAC 10 FT BLADE ELECTRD ROCKER FOR TELSCP

## (undated) DEVICE — DRAPE EQUIP TRNSPRT CONTAINMENT FOR BK TAB

## (undated) DEVICE — COVER MICSCP W46XL120IN 4 BINOC GLS LENS LEICA

## (undated) DEVICE — WAX SURG 2.5GM HEMSTAT BNE BEESWAX PARAFFIN ISO PALMITATE

## (undated) DEVICE — SUTURE VCRL SZ 2-0 L27IN ABSRB UD L36MM CP-1 1/2 CIR REV J266H

## (undated) DEVICE — GAUZE,SPONGE,4"X4",16PLY,XRAY,STRL,LF: Brand: MEDLINE

## (undated) DEVICE — BONE BIOPSY DEVICE F07A TAPERED SIZE 2: Brand: MEDTRONIC REUSABLE INSTRUMENTS

## (undated) DEVICE — DRAIN SURG 10FR 100% SIL RND END PERF W/ TRCR

## (undated) DEVICE — GLOVE SURG SZ 75 THK118MIL BLK LTX FREE POLYISOPRENE BEAD

## (undated) DEVICE — LABEL MED MINI W/ MARKER

## (undated) DEVICE — CODMAN® SURGICAL PATTIES 1/2" X 1/2" (1.27CM X 1.27CM): Brand: CODMAN®

## (undated) DEVICE — SYRINGE IRRIG 60ML SFT PLIABLE BLB EZ TO GRP 1 HND USE W/

## (undated) DEVICE — ALCON SURGICAL BLADE 64: Brand: ALCON

## (undated) DEVICE — NEEDLE BNE ACCS SZ 3 11GA DMND FOR VERTPLSTY EXPR FRAC

## (undated) DEVICE — ZINACTIVE USE 2539609 APPLICATOR MEDICATED 10.5 CC SOLUTION HI LT ORNG CHLORAPREP

## (undated) DEVICE — KIT EVAC 400CC PVC RADPQ Y CONN

## (undated) DEVICE — KAIRISON TUBING SET PNEUMATIC, (3000 MM), STERILE, DISPOSABLE, TO BE USED WITH: FK898R, PACKAGE OF 10 PIECES: Brand: KAIRISON

## (undated) DEVICE — TTL1LYR 16FR10ML 100%SIL TMPST TR: Brand: MEDLINE

## (undated) DEVICE — SPONGE GZ W4XL4IN RAYON POLY FILL CVR W/ NONWOVEN FAB

## (undated) DEVICE — GOWN,AURORA,NONREINFORCED,LARGE: Brand: MEDLINE

## (undated) DEVICE — HYPODERMIC SAFETY NEEDLE: Brand: MAGELLAN

## (undated) DEVICE — PROBE OCP210 OSTEOCOOL RF 17G 10MMX2: Brand: OSTEOCOOL™ RF ABLATION SYSTEM

## (undated) DEVICE — BANDAGE ADH W2XL4IN NITRL FAB STRP CURAD

## (undated) DEVICE — TUBING, SUCTION, 9/32" X 20', STRAIGHT: Brand: MEDLINE INDUSTRIES, INC.

## (undated) DEVICE — KIT COLLCTN L2.5IN OD1.8IN BONE DUST S STL DISP SHEEHY MESH

## (undated) DEVICE — MARKER SURG SKIN GENTIAN VLT REG TIP W/ 6IN RUL

## (undated) DEVICE — COVER LT HNDL BLU PLAS

## (undated) DEVICE — 4-PORT MANIFOLD: Brand: NEPTUNE 2

## (undated) DEVICE — DRAPE, C-ARM, BANDS, 41X120: Brand: MEDLINE

## (undated) DEVICE — SUTURE VCRL SZ 4-0 L18IN ABSRB UD L19MM PS-2 3/8 CIR PRIM J496H

## (undated) DEVICE — PAD,NON-ADHERENT,3X8,STERILE,LF,1/PK: Brand: MEDLINE

## (undated) DEVICE — SUTURE VCRL + SZ 3-0 L18IN ABSRB UD PS-2 3/8 CIR REV CUT VCP497H

## (undated) DEVICE — BONE FILLER DEVICE F04B SIZE 3

## (undated) DEVICE — PACK,LAPAROTOMY,NO GOWNS: Brand: MEDLINE

## (undated) DEVICE — SYRINGE MED 10ML LUERLOCK TIP W/O SFTY DISP

## (undated) DEVICE — 3M™ TEGADERM™ TRANSPARENT FILM DRESSING FRAME STYLE, 1627, 4 IN X 10 IN (10 CM X 25 CM), 20/CT 4CT/CASE: Brand: 3M™ TEGADERM™

## (undated) DEVICE — 3M™ STERI-DRAPE™ INSTRUMENT POUCH 1018: Brand: STERI-DRAPE™

## (undated) DEVICE — ELECTRODE PT RET AD L9FT HI MOIST COND ADH HYDRGEL CORDED